# Patient Record
Sex: MALE | Race: BLACK OR AFRICAN AMERICAN | NOT HISPANIC OR LATINO | Employment: FULL TIME | URBAN - METROPOLITAN AREA
[De-identification: names, ages, dates, MRNs, and addresses within clinical notes are randomized per-mention and may not be internally consistent; named-entity substitution may affect disease eponyms.]

---

## 2020-12-23 PROBLEM — I82.409 ACUTE DVT (DEEP VENOUS THROMBOSIS): Status: ACTIVE | Noted: 2020-12-23

## 2020-12-23 PROBLEM — J18.9 PNEUMONIA: Status: ACTIVE | Noted: 2020-12-23

## 2020-12-29 ENCOUNTER — PATIENT OUTREACH (OUTPATIENT)
Dept: ADMINISTRATIVE | Facility: CLINIC | Age: 60
End: 2020-12-29

## 2020-12-29 NOTE — PATIENT INSTRUCTIONS
Pneumonia (Adult)  Pneumonia is an infection deep within the lungs. It is in the small air sacs (alveoli). Pneumonia may be caused by a virus or bacteria. Pneumonia caused by bacteria is usually treated with an antibiotic. Severe cases may need to be treated in the hospital. Milder cases can be treated at home. Symptoms usually start to get better during the first 2 days of treatment.    Home care  Follow these guidelines when caring for yourself at home:  · Rest at home for the first 2 to 3 days, or until you feel stronger. Dont let yourself get overly tired when you go back to your activities.  · Stay away from cigarette smoke - yours or other peoples.  · You may use acetaminophen or ibuprofen to control fever or pain, unless another medicine was prescribed. If you have chronic liver or kidney disease, talk with your healthcare provider before using these medicines. Also talk with your provider if youve had a stomach ulcer or gastrointestinal bleeding. Dont give aspirin to anyone younger than 18 years of age who is ill with a fever. It may cause severe liver damage.  · Your appetite may be poor, so a light diet is fine.  · Drink 6 to 8 glasses of fluids every day to make sure you are getting enough fluids. Beverages can include water, sport drinks, sodas without caffeine, juices, tea, or soup. Fluids will help loosen secretions in the lung. This will make it easier for you to cough up the phlegm (sputum). If you also have heart or kidney disease, check with your healthcare provider before you drink extra fluids.  · Take antibiotic medicine prescribed until it is all gone, even if you are feeling better after a few days.  Follow-up care  Follow up with your healthcare provider in the next 2 to 3 days, or as advised. This is to be sure the medicine is helping you get better.  If you are 65 or older, you should get a pneumococcal vaccine and a yearly flu (influenza) shot. You should also get these vaccines if  you have chronic lung disease like asthma, emphysema, or COPD. Recently, a second type of pneumonia vaccine has become available for everyone over 65 years old. This is in addition to the previous vaccine. Ask your provider about this.  When to seek medical advice  Call your healthcare provider right away if any of these occur:  · You dont get better within the first 48 hours of treatment  · Shortness of breath gets worse  · Rapid breathing (more than 25 breaths per minute)  · Coughing up blood  · Chest pain gets worse with breathing  · Fever of 100.4°F (38°C) or higher that doesnt get better with fever medicine  · Weakness, dizziness, or fainting that gets worse  · Thirst or dry mouth that gets worse  · Sinus pain, headache, or a stiff neck  · Chest pain not caused by coughing  Date Last Reviewed: 1/1/2017  © 3262-5481 [employer]. [address] [place] [address]. All rights reserved. This information is not intended as a substitute for professional medical care. Always follow your healthcare professional's instructions.

## 2020-12-29 NOTE — PROGRESS NOTES
Please forward this important TCC information to your provider in order to maximize the post discharge care delivery of this patient.    C3 nurse spoke with Torsten Gordillo  for a TCC post hospital discharge follow up call. The patient does not have a scheduled HOSFU appointment with pcp within 7-14 days post hospital discharge date 12/28/2020. C3 nurse was unable to schedule HOSFU appointment in [facility].  Please contact pcp and schedule follow up appointment using HOSFU visit type on or before 01/12/2021    Respectfully,  Kimi Guthrie LPN  [facility]    [email or web address]       Please do not reply to this message, as this inbox is not routinely monitored.

## 2021-01-27 PROBLEM — R79.89 ELEVATED BRAIN NATRIURETIC PEPTIDE (BNP) LEVEL: Status: ACTIVE | Noted: 2021-01-27

## 2021-01-27 PROBLEM — Z87.891 HISTORY OF TOBACCO ABUSE: Status: ACTIVE | Noted: 2021-01-27

## 2021-01-27 PROBLEM — Z79.01 CHRONIC ANTICOAGULATION: Status: ACTIVE | Noted: 2021-01-27

## 2021-01-27 PROBLEM — R94.31 ABNORMAL EKG: Status: ACTIVE | Noted: 2021-01-27

## 2021-01-27 PROBLEM — I50.30 HEART FAILURE WITH PRESERVED EJECTION FRACTION, CLASS III: Status: ACTIVE | Noted: 2021-01-27

## 2021-02-23 PROBLEM — J18.9 PNEUMONIA: Status: RESOLVED | Noted: 2020-12-23 | Resolved: 2021-02-23

## 2021-02-23 PROBLEM — Z87.01 HX OF BACTERIAL PNEUMONIA: Status: ACTIVE | Noted: 2021-02-23

## 2021-02-23 PROBLEM — Z86.718 HISTORY OF DVT (DEEP VEIN THROMBOSIS): Status: ACTIVE | Noted: 2021-02-23

## 2021-02-23 PROBLEM — I82.409 ACUTE DVT (DEEP VENOUS THROMBOSIS): Status: RESOLVED | Noted: 2020-12-23 | Resolved: 2021-02-23

## 2021-05-31 PROBLEM — I50.30 HEART FAILURE WITH PRESERVED EJECTION FRACTION, CLASS III: Status: RESOLVED | Noted: 2021-01-27 | Resolved: 2021-05-31

## 2021-05-31 PROBLEM — E11.65 UNCONTROLLED TYPE 2 DIABETES MELLITUS WITH HYPERGLYCEMIA: Status: ACTIVE | Noted: 2021-05-31

## 2021-05-31 PROBLEM — I34.0 NONRHEUMATIC MITRAL VALVE REGURGITATION: Status: ACTIVE | Noted: 2021-05-31

## 2021-05-31 PROBLEM — I51.7 LEFT ATRIAL ENLARGEMENT: Status: ACTIVE | Noted: 2021-05-31

## 2021-05-31 PROBLEM — R79.89 ELEVATED BRAIN NATRIURETIC PEPTIDE (BNP) LEVEL: Status: RESOLVED | Noted: 2021-01-27 | Resolved: 2021-05-31

## 2021-05-31 PROBLEM — I51.7 RIGHT ATRIAL ENLARGEMENT: Status: ACTIVE | Noted: 2021-05-31

## 2021-05-31 PROBLEM — R07.9 CHEST PAIN: Status: ACTIVE | Noted: 2021-05-31

## 2021-05-31 PROBLEM — I51.9 LEFT VENTRICULAR DIASTOLIC DYSFUNCTION: Status: ACTIVE | Noted: 2021-05-31

## 2021-05-31 PROBLEM — I37.1 NONRHEUMATIC PULMONARY VALVE INSUFFICIENCY: Status: ACTIVE | Noted: 2021-05-31

## 2021-05-31 PROBLEM — R04.2 HEMOPTYSIS: Status: ACTIVE | Noted: 2021-05-31

## 2021-05-31 PROBLEM — I82.411 ACUTE DEEP VEIN THROMBOSIS (DVT) OF FEMORAL VEIN OF RIGHT LOWER EXTREMITY: Status: ACTIVE | Noted: 2021-05-31

## 2021-05-31 PROBLEM — I27.21 PULMONARY ARTERIAL HYPERTENSION: Status: ACTIVE | Noted: 2021-05-31

## 2021-06-09 ENCOUNTER — PATIENT OUTREACH (OUTPATIENT)
Dept: ADMINISTRATIVE | Facility: HOSPITAL | Age: 61
End: 2021-06-09

## 2021-06-23 PROBLEM — I50.20 HFREF (HEART FAILURE WITH REDUCED EJECTION FRACTION): Status: ACTIVE | Noted: 2021-06-23

## 2021-06-23 PROBLEM — I82.411 ACUTE DEEP VEIN THROMBOSIS (DVT) OF FEMORAL VEIN OF RIGHT LOWER EXTREMITY: Status: RESOLVED | Noted: 2021-05-31 | Resolved: 2021-06-23

## 2021-06-23 PROBLEM — R07.9 CHEST PAIN: Status: RESOLVED | Noted: 2021-05-31 | Resolved: 2021-06-23

## 2021-06-23 PROBLEM — E11.65 UNCONTROLLED TYPE 2 DIABETES MELLITUS WITH HYPERGLYCEMIA: Status: RESOLVED | Noted: 2021-05-31 | Resolved: 2021-06-23

## 2021-06-23 PROBLEM — Z79.01 CHRONIC ANTICOAGULATION: Status: RESOLVED | Noted: 2021-01-27 | Resolved: 2021-06-23

## 2021-06-23 PROBLEM — R04.2 HEMOPTYSIS: Status: RESOLVED | Noted: 2021-05-31 | Resolved: 2021-06-23

## 2021-07-09 PROBLEM — Z79.01 CHRONIC ANTICOAGULATION: Status: ACTIVE | Noted: 2021-07-09

## 2021-07-09 PROBLEM — R07.89 OTHER CHEST PAIN: Status: ACTIVE | Noted: 2021-07-09

## 2021-07-09 PROBLEM — I50.20 HFREF (HEART FAILURE WITH REDUCED EJECTION FRACTION): Status: RESOLVED | Noted: 2021-06-23 | Resolved: 2021-07-09

## 2021-07-09 PROBLEM — I50.20 HEART FAILURE WITH REDUCED EJECTION FRACTION, NYHA CLASS II: Status: ACTIVE | Noted: 2021-07-09

## 2021-07-09 PROBLEM — R06.09 DOE (DYSPNEA ON EXERTION): Status: ACTIVE | Noted: 2021-07-09

## 2021-07-09 PROBLEM — E11.65 UNCONTROLLED TYPE 2 DIABETES MELLITUS WITH HYPERGLYCEMIA: Status: ACTIVE | Noted: 2021-07-09

## 2021-07-09 PROBLEM — I25.10 NON-OCCLUSIVE CORONARY ARTERY DISEASE: Status: ACTIVE | Noted: 2021-07-09

## 2021-07-12 PROBLEM — J40 BRONCHITIS: Status: ACTIVE | Noted: 2021-07-12

## 2021-10-26 PROBLEM — E11.65 UNCONTROLLED TYPE 2 DIABETES MELLITUS WITH HYPERGLYCEMIA: Status: RESOLVED | Noted: 2021-07-09 | Resolved: 2021-10-26

## 2021-12-09 PROBLEM — M25.551 RIGHT HIP PAIN: Status: ACTIVE | Noted: 2021-12-09

## 2021-12-09 PROBLEM — R07.9 CHEST PAIN: Status: ACTIVE | Noted: 2021-12-09

## 2022-02-28 PROBLEM — Z79.01 CHRONIC ANTICOAGULATION: Status: RESOLVED | Noted: 2021-07-09 | Resolved: 2022-02-28

## 2022-04-12 PROBLEM — R07.9 CHEST PAIN: Status: RESOLVED | Noted: 2021-12-09 | Resolved: 2022-04-12

## 2022-04-12 PROBLEM — I27.20 PULMONARY HYPERTENSION: Status: ACTIVE | Noted: 2022-04-12

## 2022-04-12 PROBLEM — E66.812 CLASS 2 SEVERE OBESITY DUE TO EXCESS CALORIES WITH SERIOUS COMORBIDITY AND BODY MASS INDEX (BMI) OF 37.0 TO 37.9 IN ADULT: Status: ACTIVE | Noted: 2022-04-12

## 2022-04-12 PROBLEM — E66.01 CLASS 2 SEVERE OBESITY DUE TO EXCESS CALORIES WITH SERIOUS COMORBIDITY AND BODY MASS INDEX (BMI) OF 37.0 TO 37.9 IN ADULT: Status: ACTIVE | Noted: 2022-04-12

## 2022-04-12 PROBLEM — I27.21 PULMONARY ARTERIAL HYPERTENSION: Status: RESOLVED | Noted: 2021-05-31 | Resolved: 2022-04-12

## 2022-04-18 ENCOUNTER — HOSPITAL ENCOUNTER (OUTPATIENT)
Dept: RADIOLOGY | Facility: HOSPITAL | Age: 62
Discharge: HOME | End: 2022-04-18
Attending: PHYSICIAN ASSISTANT
Payer: COMMERCIAL

## 2022-04-18 DIAGNOSIS — C61 CANCER OF PROSTATE: ICD-10-CM

## 2022-04-18 PROCEDURE — 72197 MRI PELVIS W/O & W/DYE: CPT | Mod: 26,,, | Performed by: RADIOLOGY

## 2022-04-18 PROCEDURE — 72197 MRI PROSTATE W W/O CONTRAST: ICD-10-PCS | Mod: 26,,, | Performed by: RADIOLOGY

## 2022-04-18 PROCEDURE — 72197 MRI PELVIS W/O & W/DYE: CPT | Mod: TC

## 2022-04-18 PROCEDURE — 25500020 PHARM REV CODE 255: Performed by: PHYSICIAN ASSISTANT

## 2022-04-18 PROCEDURE — A9585 GADOBUTROL INJECTION: HCPCS | Performed by: PHYSICIAN ASSISTANT

## 2022-04-18 RX ORDER — GADOBUTROL 604.72 MG/ML
10 INJECTION INTRAVENOUS
Status: COMPLETED | OUTPATIENT
Start: 2022-04-18 | End: 2022-04-18

## 2022-04-18 RX ADMIN — GADOBUTROL 10 ML: 604.72 INJECTION INTRAVENOUS at 01:04

## 2022-06-11 PROBLEM — N45.3 ORCHITIS AND EPIDIDYMITIS: Status: ACTIVE | Noted: 2022-06-11

## 2022-06-14 PROBLEM — C61 PROSTATE CA: Status: ACTIVE | Noted: 2022-06-14

## 2022-07-11 ENCOUNTER — PATIENT MESSAGE (OUTPATIENT)
Dept: ADMINISTRATIVE | Facility: HOSPITAL | Age: 62
End: 2022-07-11
Payer: COMMERCIAL

## 2022-07-27 ENCOUNTER — TELEPHONE (OUTPATIENT)
Dept: CARDIOTHORACIC SURGERY | Facility: CLINIC | Age: 62
End: 2022-07-27
Payer: COMMERCIAL

## 2022-07-27 PROBLEM — Z01.818 PRE-OP EVALUATION: Status: ACTIVE | Noted: 2022-07-27

## 2022-07-27 PROBLEM — E66.812 CLASS 2 SEVERE OBESITY DUE TO EXCESS CALORIES WITH SERIOUS COMORBIDITY AND BODY MASS INDEX (BMI) OF 37.0 TO 37.9 IN ADULT: Status: RESOLVED | Noted: 2022-04-12 | Resolved: 2022-07-27

## 2022-07-27 PROBLEM — I35.1 NONRHEUMATIC AORTIC VALVE INSUFFICIENCY: Status: ACTIVE | Noted: 2022-07-27

## 2022-07-27 PROBLEM — E66.01 CLASS 2 SEVERE OBESITY DUE TO EXCESS CALORIES WITH SERIOUS COMORBIDITY AND BODY MASS INDEX (BMI) OF 37.0 TO 37.9 IN ADULT: Status: RESOLVED | Noted: 2022-04-12 | Resolved: 2022-07-27

## 2022-07-27 NOTE — TELEPHONE ENCOUNTER
Called pt to schedule consultation appointment after receiving referral from Dr. Fair. Scheduled for next Thursday per pt's request. Provided directions and will mail appointment slip to pt's confirmed mailing address. Pt verbalized understanding of all information. .

## 2022-08-04 ENCOUNTER — OFFICE VISIT (OUTPATIENT)
Dept: CARDIOTHORACIC SURGERY | Facility: CLINIC | Age: 62
End: 2022-08-04
Payer: COMMERCIAL

## 2022-08-04 VITALS
BODY MASS INDEX: 26.87 KG/M2 | SYSTOLIC BLOOD PRESSURE: 139 MMHG | RESPIRATION RATE: 20 BRPM | HEIGHT: 71 IN | HEART RATE: 73 BPM | WEIGHT: 191.94 LBS | DIASTOLIC BLOOD PRESSURE: 80 MMHG | OXYGEN SATURATION: 99 %

## 2022-08-04 DIAGNOSIS — I34.0 NONRHEUMATIC MITRAL VALVE REGURGITATION: Primary | ICD-10-CM

## 2022-08-04 DIAGNOSIS — I50.20 HEART FAILURE WITH REDUCED EJECTION FRACTION, NYHA CLASS II: Primary | ICD-10-CM

## 2022-08-04 DIAGNOSIS — I34.0 NONRHEUMATIC MITRAL VALVE REGURGITATION: ICD-10-CM

## 2022-08-04 PROCEDURE — 3079F DIAST BP 80-89 MM HG: CPT | Mod: CPTII,S$GLB,, | Performed by: THORACIC SURGERY (CARDIOTHORACIC VASCULAR SURGERY)

## 2022-08-04 PROCEDURE — 3061F NEG MICROALBUMINURIA REV: CPT | Mod: CPTII,S$GLB,, | Performed by: THORACIC SURGERY (CARDIOTHORACIC VASCULAR SURGERY)

## 2022-08-04 PROCEDURE — 99205 PR OFFICE/OUTPT VISIT, NEW, LEVL V, 60-74 MIN: ICD-10-PCS | Mod: S$GLB,,, | Performed by: THORACIC SURGERY (CARDIOTHORACIC VASCULAR SURGERY)

## 2022-08-04 PROCEDURE — 99205 OFFICE O/P NEW HI 60 MIN: CPT | Mod: S$GLB,,, | Performed by: THORACIC SURGERY (CARDIOTHORACIC VASCULAR SURGERY)

## 2022-08-04 PROCEDURE — 3061F PR NEG MICROALBUMINURIA RESULT DOCUMENTED/REVIEW: ICD-10-PCS | Mod: CPTII,S$GLB,, | Performed by: THORACIC SURGERY (CARDIOTHORACIC VASCULAR SURGERY)

## 2022-08-04 PROCEDURE — 1159F PR MEDICATION LIST DOCUMENTED IN MEDICAL RECORD: ICD-10-PCS | Mod: CPTII,S$GLB,, | Performed by: THORACIC SURGERY (CARDIOTHORACIC VASCULAR SURGERY)

## 2022-08-04 PROCEDURE — 4010F ACE/ARB THERAPY RXD/TAKEN: CPT | Mod: CPTII,S$GLB,, | Performed by: THORACIC SURGERY (CARDIOTHORACIC VASCULAR SURGERY)

## 2022-08-04 PROCEDURE — 3008F PR BODY MASS INDEX (BMI) DOCUMENTED: ICD-10-PCS | Mod: CPTII,S$GLB,, | Performed by: THORACIC SURGERY (CARDIOTHORACIC VASCULAR SURGERY)

## 2022-08-04 PROCEDURE — 3066F PR DOCUMENTATION OF TREATMENT FOR NEPHROPATHY: ICD-10-PCS | Mod: CPTII,S$GLB,, | Performed by: THORACIC SURGERY (CARDIOTHORACIC VASCULAR SURGERY)

## 2022-08-04 PROCEDURE — 3044F PR MOST RECENT HEMOGLOBIN A1C LEVEL <7.0%: ICD-10-PCS | Mod: CPTII,S$GLB,, | Performed by: THORACIC SURGERY (CARDIOTHORACIC VASCULAR SURGERY)

## 2022-08-04 PROCEDURE — 99999 PR PBB SHADOW E&M-EST. PATIENT-LVL IV: ICD-10-PCS | Mod: PBBFAC,,, | Performed by: THORACIC SURGERY (CARDIOTHORACIC VASCULAR SURGERY)

## 2022-08-04 PROCEDURE — 99999 PR PBB SHADOW E&M-EST. PATIENT-LVL IV: CPT | Mod: PBBFAC,,, | Performed by: THORACIC SURGERY (CARDIOTHORACIC VASCULAR SURGERY)

## 2022-08-04 PROCEDURE — 3066F NEPHROPATHY DOC TX: CPT | Mod: CPTII,S$GLB,, | Performed by: THORACIC SURGERY (CARDIOTHORACIC VASCULAR SURGERY)

## 2022-08-04 PROCEDURE — 4010F PR ACE/ARB THEARPY RXD/TAKEN: ICD-10-PCS | Mod: CPTII,S$GLB,, | Performed by: THORACIC SURGERY (CARDIOTHORACIC VASCULAR SURGERY)

## 2022-08-04 PROCEDURE — 3075F SYST BP GE 130 - 139MM HG: CPT | Mod: CPTII,S$GLB,, | Performed by: THORACIC SURGERY (CARDIOTHORACIC VASCULAR SURGERY)

## 2022-08-04 PROCEDURE — 3075F PR MOST RECENT SYSTOLIC BLOOD PRESS GE 130-139MM HG: ICD-10-PCS | Mod: CPTII,S$GLB,, | Performed by: THORACIC SURGERY (CARDIOTHORACIC VASCULAR SURGERY)

## 2022-08-04 PROCEDURE — 3079F PR MOST RECENT DIASTOLIC BLOOD PRESSURE 80-89 MM HG: ICD-10-PCS | Mod: CPTII,S$GLB,, | Performed by: THORACIC SURGERY (CARDIOTHORACIC VASCULAR SURGERY)

## 2022-08-04 PROCEDURE — 1159F MED LIST DOCD IN RCRD: CPT | Mod: CPTII,S$GLB,, | Performed by: THORACIC SURGERY (CARDIOTHORACIC VASCULAR SURGERY)

## 2022-08-04 PROCEDURE — 3008F BODY MASS INDEX DOCD: CPT | Mod: CPTII,S$GLB,, | Performed by: THORACIC SURGERY (CARDIOTHORACIC VASCULAR SURGERY)

## 2022-08-04 PROCEDURE — 3044F HG A1C LEVEL LT 7.0%: CPT | Mod: CPTII,S$GLB,, | Performed by: THORACIC SURGERY (CARDIOTHORACIC VASCULAR SURGERY)

## 2022-08-04 PROCEDURE — 3072F PR LOW RISK FOR RETINOPATHY: ICD-10-PCS | Mod: CPTII,S$GLB,, | Performed by: THORACIC SURGERY (CARDIOTHORACIC VASCULAR SURGERY)

## 2022-08-04 PROCEDURE — 3072F LOW RISK FOR RETINOPATHY: CPT | Mod: CPTII,S$GLB,, | Performed by: THORACIC SURGERY (CARDIOTHORACIC VASCULAR SURGERY)

## 2022-08-04 NOTE — PROGRESS NOTES
Subjective:      Patient ID: Torsten Gordillo is a 62 y.o. male.    Chief Complaint: No chief complaint on file.      HPI:  Torsten Gordillo is a 62 y.o. male who presents for surgical evaluation of MR. Medical conditions include COPD, DM2, JULIAN, Fatty Liver, HTN, HLD, dilated cardiomyopathy, prostate cancer following with urology (low risk group per report). Patient reports that he has been having increasing SOB. Can only walk 1/2 block prior to having difficulty. Energy level is at a zero. Occasional dizziness and lower extremity swelling. Some orthopnea but not every night. Also with occasional palpitations at night. No chest pain. No prior strokes, seizures, stents, or sternotomies. Had a DVT last year when admitted for heart failure that resolved with anticoagulation. Quit smoking 3 months ago. Prior to that smoked 1/2ppd for around 40 years. No significant drinking history. Diabetes is controlled by diet and patient does not check them regularly.     Family and social history reviewed    Current Outpatient Medications   Medication Instructions    acetaminophen (TYLENOL) 650 mg, Oral, Every 6 hours PRN    albuterol (PROVENTIL/VENTOLIN HFA) 90 mcg/actuation inhaler 2 puffs, Inhalation, Every 6 hours PRN, Rescue    aspirin (ECOTRIN) 81 mg, Oral, Daily    atorvastatin (LIPITOR) 80 mg, Oral, Nightly    carvediloL (COREG) 25 mg, Oral, 2 times daily with meals    cetirizine (ZYRTEC) 10 mg, Oral, Daily    diclofenac sodium (VOLTAREN) 2 g, Topical (Top), 4 times daily    ezetimibe (ZETIA) 10 mg, Oral, Daily    finasteride (PROSCAR) 5 mg, Oral, Daily    fluticasone propionate (FLONASE) 50 mcg, Each Nostril, Daily    furosemide (LASIX) 40 mg, Oral, As needed (PRN), 1 up to 4 a day extra, ONLY as needed for swelling    glimepiride (AMARYL) 4 mg, Oral, With breakfast    lancets Misc Test daily    nitroGLYCERIN (NITROSTAT) 0.4 mg, Sublingual, Every 5 min PRN    sacubitriL-valsartan (ENTRESTO) 49-51 mg per  tablet 1 tablet, Oral, 2 times daily    SPIRIVA WITH HANDIHALER 18 mcg, Inhalation, Daily    spironolactone (ALDACTONE) 25 mg, Oral, Daily    tiZANidine (ZANAFLEX) 4 mg, Oral, Every 8 hours PRN    traMADoL (ULTRAM) 50 mg, Oral, Every 8 hours PRN       Review of patient's allergies indicates:  No Known Allergies  Past Medical History:   Diagnosis Date    Bronchitis     Cardiomyopathy 5/2/2016    40-45% by SIMONA    COPD (chronic obstructive pulmonary disease)     Diabetes mellitus     DM (diabetes mellitus), type 2 5/11/2016    Fatty liver     Heart failure with reduced ejection fraction, NYHA class II 7/9/2021    EF 40% in 04/2021 NYHA III; sleeps in recliner, PND, rare LE edema    History of DVT (deep vein thrombosis) 2/23/2021    Hyperlipidemia 5/2/2016    Hypertension     Nonrheumatic mitral valve regurgitation 5/31/2021    Severe    Pulmonary arterial hypertension 5/31/2021    Moderate    Suspected sleep apnea 5/2/2016     Past Surgical History:   Procedure Laterality Date    BIOPSY WITH TRANSRECTAL ULTRASOUND (TRUS) GUIDANCE N/A 2/2/2022    Procedure: BIOPSY, WITH TRANSRECTAL US GUIDANCE;  Surgeon: Thomas Ventura II, MD;  Location: [facility];  Service: Urology;  Laterality: N/A;  prostate     COLONOSCOPY N/A 7/5/2016    Procedure: COLONOSCOPY;  Surgeon: Faith Harris MD;  Location: [facility];  Service: Endoscopy;  Laterality: N/A;    COLONOSCOPY N/A 11/16/2021    Procedure: COLONOSCOPY;  Surgeon: Luis Bogran-Reyes, MD;  Location: [facility];  Service: Endoscopy;  Laterality: N/A;    CYSTOSCOPY N/A 2/2/2022    Procedure: CYSTOSCOPY;  Surgeon: Thomas Ventura II, MD;  Location: [facility];  Service: Urology;  Laterality: N/A;    LEFT HEART CATHETERIZATION Left 7/7/2022    Procedure: CATHETERIZATION, HEART, LEFT;  Surgeon: Quinn Duke MD;  Location[facility] CATH LAB;  Service: Cardiology;  Laterality: Left;     Family History     Problem Relation (Age of Onset)    Diabetes Mother     Hypertension Mother    Prostate cancer Father        Social History     Socioeconomic History    Marital status:     Years of education: 12   Tobacco Use    Smoking status: Former Smoker     Packs/day: 1.00     Years: 39.00     Pack years: 39.00     Types: Cigarettes     Start date: 1977     Quit date: 2021     Years since quittin.5    Smokeless tobacco: Never Used   Substance and Sexual Activity    Alcohol use: No     Alcohol/week: 0.0 standard drinks    Drug use: No    Sexual activity: Not Currently       Current medications Reviewed    Review of Systems   Constitutional: Positive for activity change and fatigue.   HENT: Negative for nosebleeds.    Eyes: Negative for visual disturbance.   Respiratory: Positive for cough and shortness of breath.    Cardiovascular: Positive for palpitations and leg swelling. Negative for chest pain.   Gastrointestinal: Negative for nausea.   Musculoskeletal: Negative for gait problem.   Skin: Negative for color change.   Neurological: Positive for dizziness. Negative for seizures.   Hematological: Does not bruise/bleed easily.   Psychiatric/Behavioral: Negative for sleep disturbance.     Objective:   Physical Exam  Vitals reviewed.   Constitutional:       General: He is not in acute distress.     Appearance: He is well-developed. He is not diaphoretic.   HENT:      Head: Normocephalic and atraumatic.   Eyes:      Pupils: Pupils are equal, round, and reactive to light.   Neck:      Vascular: No JVD.   Cardiovascular:      Rate and Rhythm: Normal rate.   Pulmonary:      Effort: Pulmonary effort is normal. No respiratory distress.   Abdominal:      General: There is no distension.   Musculoskeletal:         General: No swelling.      Cervical back: Normal range of motion.   Skin:     Coloration: Skin is not pale.   Neurological:      Mental Status: He is alert and oriented to person, place, and time.   Psychiatric:         Speech: Speech normal.          Behavior: Behavior normal.         Thought Content: Thought content normal.         Judgment: Judgment normal.       Diagnostic Results: reviewed   Carotid US 7/28/22  1. No evidence of flowing stenosis regard to the right nor left carotid system.  2. Medialized course involving the bilateral internal carotid arteries.  3. Luminal stenosis in the less than 50% luminal diameter reduction range exclusively on the left given the fairly broad spectral waveform pattern.    SIMONA 7/7/22  1. The left ventricle (LV) is dilated with low normal to mildly depressed systolic function. LV ejection fraction is 45-50%.  2. The right ventricle (RV) is normal in size with normal systolic function.   3. Enlarged atria.  4. The aortic valve is tri-leaflet with normal mobility. There is mild aortic regurgitation. Ascending aorta is mildly enlarged measuring 3.7 cm.   5. The mitral valve is normal in structure with normal mobility. There is clear mal-coaptation due to annular dilatation resulting in multiple regurgitant jets; mitral valve regurgitation grade is severe.  6. Mild-moderate tricuspid regurgitation.    [facility] 7/7/22  · Nonobstructive coronary artery disease  · Normal filling pressures    TTE 4/27/22  · The left ventricle is moderately enlarged with moderate eccentric hypertrophy and mildly decreased systolic function.  · The estimated ejection fraction is about 40-45%.  · The quantitatively derived ejection fraction is 41%.  · The left ventricular global longitudinal strain is -13%.  · There is mild left ventricular global hypokinesis.  · Grade II left ventricular diastolic dysfunction.  · Severe left atrial enlargement.  · Normal right ventricular size with normal right ventricular systolic function.  · Severe mitral regurgitation.  · The estimated PA systolic pressure is 60 mmHg.  · There is moderate to severe pulmonary hypertension.  · Mild pulmonic regurgitation.  · Moderate tricuspid regurgitation.  · Normal central venous  pressure (3 mmHg).  · Consider SIMONA to better visualize the mitral valve, if clinically indicated.  · Strain imaging suggestive of cardiac amyloidosis. Clinical correlation is required.  · LV 6.14cm    Assessment:   Severe MR   Plan:     CTS Attending Note:    I have personally taken the history and examined this patient and agree with the KAMALJIT's note as stated above.  Very pleasant 62-year-old gentleman with severe mitral regurgitation and pulmonary hypertension.  He is symptomatic for this.  I recommended mitral valve repair, and he agreed with this.  We discussed the risks and benefits of the proposed procedure, including but not limited to death, stroke, respiratory complications, renal complications, arrythmia, need for permanent pacemaker, and infection. We discussed the STS predicted risk. His questions have been answered, and he wishes to proceed. After a discussion of the advantages and disadvantages of tissue and mechanical prostheses, he indicated that he desires a tissue valve should repair not be feasible.

## 2022-08-08 DIAGNOSIS — Z01.818 PRE-OP TESTING: ICD-10-CM

## 2022-08-08 DIAGNOSIS — I34.0 NONRHEUMATIC MITRAL VALVE REGURGITATION: Primary | ICD-10-CM

## 2022-08-09 ENCOUNTER — TELEPHONE (OUTPATIENT)
Dept: CARDIOTHORACIC SURGERY | Facility: CLINIC | Age: 62
End: 2022-08-09
Payer: COMMERCIAL

## 2022-08-09 NOTE — TELEPHONE ENCOUNTER
Called pt to review pre-op testing appointments which have been scheduled for Tuesday, 9/6. Also reviewed pt medications. Pt will need to take last dose of Entresto on Sunday, 9/4. Pt will receive call from anesthesia department with instructions on diabetes medications. Will mail appointment slips to pt's confirmed mailing address. Pt verbalized understanding of all information.

## 2022-08-29 ENCOUNTER — TELEPHONE (OUTPATIENT)
Dept: CARDIOTHORACIC SURGERY | Facility: CLINIC | Age: 62
End: 2022-08-29
Payer: COMMERCIAL

## 2022-08-29 NOTE — TELEPHONE ENCOUNTER
Returned call to pt and provided him the fax number, phone number, and email address for the FMLA/Disability Department. Pt verbalized understanding.       ----- Message from Hi Stapleton sent at 8/29/2022  9:03 AM CDT -----  Regarding: advise-FMLA  Contact: PT @ [phone]  Pt is calling to speak to someone in Dr. Andrew's office to discuss FMLA paper work that he has. Pt states that his job provided him with FMLA papers to have Dr. Andrew fill out his part, so that he can give to his employer. Please call. Thanks.

## 2022-09-01 ENCOUNTER — TELEPHONE (OUTPATIENT)
Dept: CARDIOTHORACIC SURGERY | Facility: CLINIC | Age: 62
End: 2022-09-01
Payer: COMMERCIAL

## 2022-09-01 NOTE — TELEPHONE ENCOUNTER
Called pt to remind him to take last dose of Entresto on Sunday, 9/4. Future doses after Sunday should be held. Pt verbalized understanding.

## 2022-09-06 ENCOUNTER — HOSPITAL ENCOUNTER (OUTPATIENT)
Dept: VASCULAR SURGERY | Facility: CLINIC | Age: 62
Discharge: HOME | End: 2022-09-06
Attending: THORACIC SURGERY (CARDIOTHORACIC VASCULAR SURGERY)
Payer: COMMERCIAL

## 2022-09-06 ENCOUNTER — OFFICE VISIT (OUTPATIENT)
Dept: CARDIOTHORACIC SURGERY | Facility: CLINIC | Age: 62
End: 2022-09-06
Payer: COMMERCIAL

## 2022-09-06 ENCOUNTER — HOSPITAL ENCOUNTER (OUTPATIENT)
Dept: CARDIOLOGY | Facility: HOSPITAL | Age: 62
Discharge: HOME | DRG: 220 | End: 2022-09-06
Attending: THORACIC SURGERY (CARDIOTHORACIC VASCULAR SURGERY)
Payer: COMMERCIAL

## 2022-09-06 ENCOUNTER — HOSPITAL ENCOUNTER (OUTPATIENT)
Dept: CARDIOLOGY | Facility: CLINIC | Age: 62
Discharge: HOME | End: 2022-09-06
Payer: COMMERCIAL

## 2022-09-06 VITALS
SYSTOLIC BLOOD PRESSURE: 130 MMHG | OXYGEN SATURATION: 99 % | RESPIRATION RATE: 18 BRPM | DIASTOLIC BLOOD PRESSURE: 99 MMHG | BODY MASS INDEX: 26.47 KG/M2 | HEIGHT: 71 IN | HEART RATE: 78 BPM | WEIGHT: 189.06 LBS

## 2022-09-06 VITALS
HEIGHT: 71 IN | WEIGHT: 191 LBS | DIASTOLIC BLOOD PRESSURE: 80 MMHG | SYSTOLIC BLOOD PRESSURE: 139 MMHG | HEART RATE: 75 BPM | BODY MASS INDEX: 26.74 KG/M2

## 2022-09-06 DIAGNOSIS — Z01.818 PRE-OP TESTING: ICD-10-CM

## 2022-09-06 DIAGNOSIS — I34.0 NONRHEUMATIC MITRAL VALVE REGURGITATION: ICD-10-CM

## 2022-09-06 DIAGNOSIS — I34.0 NONRHEUMATIC MITRAL VALVE REGURGITATION: Primary | ICD-10-CM

## 2022-09-06 DIAGNOSIS — Z01.818 PRE-OP EXAMINATION: Primary | ICD-10-CM

## 2022-09-06 DIAGNOSIS — I34.0 MITRAL REGURGITATION: ICD-10-CM

## 2022-09-06 LAB
ASCENDING AORTA: 3.98 CM
AV INDEX (PROSTH): 0.71
AV MEAN GRADIENT: 3 MMHG
AV PEAK GRADIENT: 6 MMHG
AV VALVE AREA: 3.03 CM2
AV VELOCITY RATIO: 0.76
BSA FOR ECHO PROCEDURE: 2.08 M2
CV ECHO LV RWT: 0.33 CM
DOP CALC AO PEAK VEL: 1.19 M/S
DOP CALC AO VTI: 18.78 CM
DOP CALC LVOT AREA: 4.3 CM2
DOP CALC LVOT DIAMETER: 2.33 CM
DOP CALC LVOT PEAK VEL: 0.9 M/S
DOP CALC LVOT STROKE VOLUME: 56.98 CM3
DOP CALCLVOT PEAK VEL VTI: 13.37 CM
E WAVE DECELERATION TIME: 95.81 MSEC
E/A RATIO: 2.18
E/E' RATIO: 12.59 M/S
ECHO LV POSTERIOR WALL: 1.03 CM (ref 0.6–1.1)
EJECTION FRACTION: 38 %
FRACTIONAL SHORTENING: 20 % (ref 28–44)
INTERVENTRICULAR SEPTUM: 0.87 CM (ref 0.6–1.1)
IVRT: 74.22 MSEC
LA MAJOR: 7.06 CM
LA MINOR: 7.05 CM
LA WIDTH: 5.75 CM
LEFT ATRIUM SIZE: 4.68 CM
LEFT ATRIUM VOLUME INDEX MOD: 72.3 ML/M2
LEFT ATRIUM VOLUME INDEX: 78 ML/M2
LEFT ATRIUM VOLUME MOD: 149.57 CM3
LEFT ATRIUM VOLUME: 161.37 CM3
LEFT INTERNAL DIMENSION IN SYSTOLE: 4.94 CM (ref 2.1–4)
LEFT VENTRICLE DIASTOLIC VOLUME INDEX: 92.77 ML/M2
LEFT VENTRICLE DIASTOLIC VOLUME: 192.03 ML
LEFT VENTRICLE MASS INDEX: 117 G/M2
LEFT VENTRICLE SYSTOLIC VOLUME INDEX: 55.5 ML/M2
LEFT VENTRICLE SYSTOLIC VOLUME: 114.93 ML
LEFT VENTRICULAR INTERNAL DIMENSION IN DIASTOLE: 6.17 CM (ref 3.5–6)
LEFT VENTRICULAR MASS: 242.44 G
LV LATERAL E/E' RATIO: 10.7 M/S
LV SEPTAL E/E' RATIO: 15.29 M/S
MV A" WAVE DURATION": 18.55 MSEC
MV PEAK A VEL: 0.49 M/S
MV PEAK E VEL: 1.07 M/S
MV STENOSIS PRESSURE HALF TIME: 27.78 MS
MV VALVE AREA P 1/2 METHOD: 7.92 CM2
PISA TR MAX VEL: 3.26 M/S
PULM VEIN S/D RATIO: 0.43
PV PEAK D VEL: 0.74 M/S
PV PEAK S VEL: 0.32 M/S
RA MAJOR: 5.46 CM
RA PRESSURE: 3 MMHG
RA WIDTH: 4.91 CM
RIGHT VENTRICULAR END-DIASTOLIC DIMENSION: 3.88 CM
RV TISSUE DOPPLER FREE WALL SYSTOLIC VELOCITY 1 (APICAL 4 CHAMBER VIEW): 12.73 CM/S
SINUS: 3.93 CM
STJ: 3.61 CM
TDI LATERAL: 0.1 M/S
TDI SEPTAL: 0.07 M/S
TDI: 0.09 M/S
TR MAX PG: 43 MMHG
TRICUSPID ANNULAR PLANE SYSTOLIC EXCURSION: 2.22 CM
TV REST PULMONARY ARTERY PRESSURE: 46 MMHG

## 2022-09-06 PROCEDURE — 93005 EKG 12-LEAD: ICD-10-PCS | Mod: S$GLB,,, | Performed by: THORACIC SURGERY (CARDIOTHORACIC VASCULAR SURGERY)

## 2022-09-06 PROCEDURE — 4010F PR ACE/ARB THEARPY RXD/TAKEN: ICD-10-PCS | Mod: CPTII,S$GLB,, | Performed by: THORACIC SURGERY (CARDIOTHORACIC VASCULAR SURGERY)

## 2022-09-06 PROCEDURE — 3066F NEPHROPATHY DOC TX: CPT | Mod: CPTII,S$GLB,, | Performed by: THORACIC SURGERY (CARDIOTHORACIC VASCULAR SURGERY)

## 2022-09-06 PROCEDURE — 1159F PR MEDICATION LIST DOCUMENTED IN MEDICAL RECORD: ICD-10-PCS | Mod: CPTII,S$GLB,, | Performed by: THORACIC SURGERY (CARDIOTHORACIC VASCULAR SURGERY)

## 2022-09-06 PROCEDURE — 99999 PR PBB SHADOW E&M-EST. PATIENT-LVL IV: ICD-10-PCS | Mod: PBBFAC,,, | Performed by: THORACIC SURGERY (CARDIOTHORACIC VASCULAR SURGERY)

## 2022-09-06 PROCEDURE — 3080F DIAST BP >= 90 MM HG: CPT | Mod: CPTII,S$GLB,, | Performed by: THORACIC SURGERY (CARDIOTHORACIC VASCULAR SURGERY)

## 2022-09-06 PROCEDURE — 3080F PR MOST RECENT DIASTOLIC BLOOD PRESSURE >= 90 MM HG: ICD-10-PCS | Mod: CPTII,S$GLB,, | Performed by: THORACIC SURGERY (CARDIOTHORACIC VASCULAR SURGERY)

## 2022-09-06 PROCEDURE — 3044F HG A1C LEVEL LT 7.0%: CPT | Mod: CPTII,S$GLB,, | Performed by: THORACIC SURGERY (CARDIOTHORACIC VASCULAR SURGERY)

## 2022-09-06 PROCEDURE — 93005 ELECTROCARDIOGRAM TRACING: CPT | Mod: S$GLB,,, | Performed by: THORACIC SURGERY (CARDIOTHORACIC VASCULAR SURGERY)

## 2022-09-06 PROCEDURE — 1159F MED LIST DOCD IN RCRD: CPT | Mod: CPTII,S$GLB,, | Performed by: THORACIC SURGERY (CARDIOTHORACIC VASCULAR SURGERY)

## 2022-09-06 PROCEDURE — 4010F ACE/ARB THERAPY RXD/TAKEN: CPT | Mod: CPTII,S$GLB,, | Performed by: THORACIC SURGERY (CARDIOTHORACIC VASCULAR SURGERY)

## 2022-09-06 PROCEDURE — 93306 ECHO (CUPID ONLY): ICD-10-PCS | Mod: 26,,, | Performed by: INTERNAL MEDICINE

## 2022-09-06 PROCEDURE — 3008F PR BODY MASS INDEX (BMI) DOCUMENTED: ICD-10-PCS | Mod: CPTII,S$GLB,, | Performed by: THORACIC SURGERY (CARDIOTHORACIC VASCULAR SURGERY)

## 2022-09-06 PROCEDURE — 3075F PR MOST RECENT SYSTOLIC BLOOD PRESS GE 130-139MM HG: ICD-10-PCS | Mod: CPTII,S$GLB,, | Performed by: THORACIC SURGERY (CARDIOTHORACIC VASCULAR SURGERY)

## 2022-09-06 PROCEDURE — 86920 COMPATIBILITY TEST SPIN: CPT | Performed by: STUDENT IN AN ORGANIZED HEALTH CARE EDUCATION/TRAINING PROGRAM

## 2022-09-06 PROCEDURE — 3072F PR LOW RISK FOR RETINOPATHY: ICD-10-PCS | Mod: CPTII,S$GLB,, | Performed by: THORACIC SURGERY (CARDIOTHORACIC VASCULAR SURGERY)

## 2022-09-06 PROCEDURE — 3061F NEG MICROALBUMINURIA REV: CPT | Mod: CPTII,S$GLB,, | Performed by: THORACIC SURGERY (CARDIOTHORACIC VASCULAR SURGERY)

## 2022-09-06 PROCEDURE — 93306 TTE W/DOPPLER COMPLETE: CPT | Mod: 26,,, | Performed by: INTERNAL MEDICINE

## 2022-09-06 PROCEDURE — 93010 EKG 12-LEAD: ICD-10-PCS | Mod: S$GLB,,, | Performed by: INTERNAL MEDICINE

## 2022-09-06 PROCEDURE — 99499 UNLISTED E&M SERVICE: CPT | Mod: S$GLB,,, | Performed by: THORACIC SURGERY (CARDIOTHORACIC VASCULAR SURGERY)

## 2022-09-06 PROCEDURE — 99499 NO LOS: ICD-10-PCS | Mod: S$GLB,,, | Performed by: THORACIC SURGERY (CARDIOTHORACIC VASCULAR SURGERY)

## 2022-09-06 PROCEDURE — 93880 PR DUPLEX SCAN EXTRACRANIAL,BILAT: ICD-10-PCS | Mod: S$GLB,,, | Performed by: SURGERY

## 2022-09-06 PROCEDURE — 3072F LOW RISK FOR RETINOPATHY: CPT | Mod: CPTII,S$GLB,, | Performed by: THORACIC SURGERY (CARDIOTHORACIC VASCULAR SURGERY)

## 2022-09-06 PROCEDURE — 93010 ELECTROCARDIOGRAM REPORT: CPT | Mod: S$GLB,,, | Performed by: INTERNAL MEDICINE

## 2022-09-06 PROCEDURE — 99999 PR PBB SHADOW E&M-EST. PATIENT-LVL IV: CPT | Mod: PBBFAC,,, | Performed by: THORACIC SURGERY (CARDIOTHORACIC VASCULAR SURGERY)

## 2022-09-06 PROCEDURE — 3044F PR MOST RECENT HEMOGLOBIN A1C LEVEL <7.0%: ICD-10-PCS | Mod: CPTII,S$GLB,, | Performed by: THORACIC SURGERY (CARDIOTHORACIC VASCULAR SURGERY)

## 2022-09-06 PROCEDURE — 93306 TTE W/DOPPLER COMPLETE: CPT

## 2022-09-06 PROCEDURE — 3061F PR NEG MICROALBUMINURIA RESULT DOCUMENTED/REVIEW: ICD-10-PCS | Mod: CPTII,S$GLB,, | Performed by: THORACIC SURGERY (CARDIOTHORACIC VASCULAR SURGERY)

## 2022-09-06 PROCEDURE — 3066F PR DOCUMENTATION OF TREATMENT FOR NEPHROPATHY: ICD-10-PCS | Mod: CPTII,S$GLB,, | Performed by: THORACIC SURGERY (CARDIOTHORACIC VASCULAR SURGERY)

## 2022-09-06 PROCEDURE — 3008F BODY MASS INDEX DOCD: CPT | Mod: CPTII,S$GLB,, | Performed by: THORACIC SURGERY (CARDIOTHORACIC VASCULAR SURGERY)

## 2022-09-06 PROCEDURE — 93880 EXTRACRANIAL BILAT STUDY: CPT | Mod: S$GLB,,, | Performed by: SURGERY

## 2022-09-06 PROCEDURE — 3075F SYST BP GE 130 - 139MM HG: CPT | Mod: CPTII,S$GLB,, | Performed by: THORACIC SURGERY (CARDIOTHORACIC VASCULAR SURGERY)

## 2022-09-06 RX ORDER — POTASSIUM CHLORIDE 14.9 MG/ML
60 INJECTION INTRAVENOUS
Status: CANCELLED | OUTPATIENT
Start: 2022-09-06

## 2022-09-06 RX ORDER — ASPIRIN 325 MG
325 TABLET ORAL DAILY
Status: CANCELLED | OUTPATIENT
Start: 2022-09-06

## 2022-09-06 RX ORDER — FAMOTIDINE 10 MG/ML
20 INJECTION INTRAVENOUS 2 TIMES DAILY
Status: CANCELLED | OUTPATIENT
Start: 2022-09-06

## 2022-09-06 RX ORDER — SODIUM CHLORIDE 9 MG/ML
INJECTION, SOLUTION INTRAVENOUS CONTINUOUS
Status: CANCELLED | OUTPATIENT
Start: 2022-09-06

## 2022-09-06 RX ORDER — METOCLOPRAMIDE HYDROCHLORIDE 5 MG/ML
5 INJECTION INTRAMUSCULAR; INTRAVENOUS EVERY 6 HOURS PRN
Status: CANCELLED | OUTPATIENT
Start: 2022-09-06

## 2022-09-06 RX ORDER — MUPIROCIN 20 MG/G
1 OINTMENT TOPICAL 2 TIMES DAILY
Status: CANCELLED | OUTPATIENT
Start: 2022-09-06 | End: 2022-09-11

## 2022-09-06 RX ORDER — ALBUMIN HUMAN 50 G/1000ML
25 SOLUTION INTRAVENOUS ONCE AS NEEDED
Status: CANCELLED | OUTPATIENT
Start: 2022-09-06 | End: 2034-02-02

## 2022-09-06 RX ORDER — LIDOCAINE HYDROCHLORIDE 10 MG/ML
1 INJECTION, SOLUTION EPIDURAL; INFILTRATION; INTRACAUDAL; PERINEURAL
Status: CANCELLED | OUTPATIENT
Start: 2022-09-06

## 2022-09-06 RX ORDER — SODIUM CHLORIDE 0.9 % (FLUSH) 0.9 %
10 SYRINGE (ML) INJECTION
Status: CANCELLED | OUTPATIENT
Start: 2022-09-06

## 2022-09-06 RX ORDER — PROPOFOL 10 MG/ML
0-50 INJECTION, EMULSION INTRAVENOUS CONTINUOUS
Status: CANCELLED | OUTPATIENT
Start: 2022-09-06

## 2022-09-06 RX ORDER — ASPIRIN 325 MG
325 TABLET ORAL ONCE
Status: CANCELLED | OUTPATIENT
Start: 2022-09-06 | End: 2022-09-06

## 2022-09-06 RX ORDER — IPRATROPIUM BROMIDE AND ALBUTEROL SULFATE 2.5; .5 MG/3ML; MG/3ML
3 SOLUTION RESPIRATORY (INHALATION) EVERY 4 HOURS
Status: CANCELLED | OUTPATIENT
Start: 2022-09-06 | End: 2022-09-07

## 2022-09-06 RX ORDER — DEXTROSE MONOHYDRATE, SODIUM CHLORIDE, AND POTASSIUM CHLORIDE 50; 1.49; 4.5 G/1000ML; G/1000ML; G/1000ML
INJECTION, SOLUTION INTRAVENOUS CONTINUOUS
Status: CANCELLED | OUTPATIENT
Start: 2022-09-06

## 2022-09-06 RX ORDER — FENTANYL CITRATE 50 UG/ML
25 INJECTION, SOLUTION INTRAMUSCULAR; INTRAVENOUS
Status: CANCELLED | OUTPATIENT
Start: 2022-09-06 | End: 2022-09-10

## 2022-09-06 RX ORDER — POLYETHYLENE GLYCOL 3350 17 G/17G
17 POWDER, FOR SOLUTION ORAL DAILY
Status: CANCELLED | OUTPATIENT
Start: 2022-09-06

## 2022-09-06 RX ORDER — FENTANYL CITRATE 50 UG/ML
50 INJECTION, SOLUTION INTRAMUSCULAR; INTRAVENOUS
Status: CANCELLED | OUTPATIENT
Start: 2022-09-11

## 2022-09-06 RX ORDER — ASPIRIN 325 MG
325 TABLET, DELAYED RELEASE (ENTERIC COATED) ORAL DAILY
Status: CANCELLED | OUTPATIENT
Start: 2022-09-06

## 2022-09-06 RX ORDER — FENTANYL CITRATE 50 UG/ML
25 INJECTION, SOLUTION INTRAMUSCULAR; INTRAVENOUS
Status: CANCELLED | OUTPATIENT
Start: 2022-09-06

## 2022-09-06 RX ORDER — OXYCODONE HYDROCHLORIDE 5 MG/1
10 TABLET ORAL EVERY 4 HOURS PRN
Status: CANCELLED | OUTPATIENT
Start: 2022-09-06

## 2022-09-06 RX ORDER — FAMOTIDINE 20 MG/1
20 TABLET, FILM COATED ORAL 2 TIMES DAILY
Status: CANCELLED | OUTPATIENT
Start: 2022-09-06

## 2022-09-06 RX ORDER — BISACODYL 10 MG
10 SUPPOSITORY, RECTAL RECTAL DAILY PRN
Status: CANCELLED | OUTPATIENT
Start: 2022-09-06

## 2022-09-06 RX ORDER — METOPROLOL TARTRATE 25 MG/1
25 TABLET ORAL
Status: CANCELLED | OUTPATIENT
Start: 2022-09-06

## 2022-09-06 RX ORDER — OXYCODONE HYDROCHLORIDE 5 MG/1
5 TABLET ORAL EVERY 4 HOURS PRN
Status: CANCELLED | OUTPATIENT
Start: 2022-09-06

## 2022-09-06 RX ORDER — ONDANSETRON 2 MG/ML
4 INJECTION INTRAMUSCULAR; INTRAVENOUS EVERY 12 HOURS PRN
Status: CANCELLED | OUTPATIENT
Start: 2022-09-06

## 2022-09-06 RX ORDER — ATORVASTATIN CALCIUM 10 MG/1
40 TABLET, FILM COATED ORAL NIGHTLY
Status: CANCELLED | OUTPATIENT
Start: 2022-09-06

## 2022-09-06 RX ORDER — POTASSIUM CHLORIDE 750 MG/1
20 TABLET, EXTENDED RELEASE ORAL EVERY 12 HOURS
Status: CANCELLED | OUTPATIENT
Start: 2022-09-06

## 2022-09-06 RX ORDER — ACETAMINOPHEN 325 MG/1
650 TABLET ORAL EVERY 4 HOURS PRN
Status: CANCELLED | OUTPATIENT
Start: 2022-09-06

## 2022-09-06 RX ORDER — IPRATROPIUM BROMIDE AND ALBUTEROL SULFATE 2.5; .5 MG/3ML; MG/3ML
3 SOLUTION RESPIRATORY (INHALATION) EVERY 4 HOURS PRN
Status: CANCELLED | OUTPATIENT
Start: 2022-09-06 | End: 2022-09-07

## 2022-09-06 RX ORDER — ASPIRIN 300 MG/1
300 SUPPOSITORY RECTAL ONCE AS NEEDED
Status: CANCELLED | OUTPATIENT
Start: 2022-09-06 | End: 2034-02-02

## 2022-09-06 RX ORDER — POTASSIUM CHLORIDE 29.8 MG/ML
40 INJECTION INTRAVENOUS
Status: CANCELLED | OUTPATIENT
Start: 2022-09-06

## 2022-09-06 RX ORDER — DOCUSATE SODIUM 100 MG/1
100 CAPSULE, LIQUID FILLED ORAL 2 TIMES DAILY
Status: CANCELLED | OUTPATIENT
Start: 2022-09-06

## 2022-09-06 RX ORDER — MUPIROCIN 20 MG/G
1 OINTMENT TOPICAL
Status: CANCELLED | OUTPATIENT
Start: 2022-09-06

## 2022-09-06 RX ORDER — POTASSIUM CHLORIDE 14.9 MG/ML
20 INJECTION INTRAVENOUS
Status: CANCELLED | OUTPATIENT
Start: 2022-09-06

## 2022-09-06 NOTE — PROGRESS NOTES
Subjective:      Patient ID: Torsten Gordillo is a 62 y.o. male.    Chief Complaint: No chief complaint on file.      HPI:  Torsten Gordillo is a 62 y.o. male who presents for pre-op Mvr/R. Medical conditions include COPD, DM2, JULIAN, Fatty Liver, HTN, HLD, dilated cardiomyopathy, prostate cancer following with urology (low risk group per report). Patient reports that he has been having increasing SOB. Can only walk 1/2 block prior to having difficulty. Energy level is at a zero. Occasional dizziness and lower extremity swelling. Some orthopnea but not every night. Also with occasional palpitations at night. No chest pain. No prior strokes, seizures, stents, or sternotomies. Had a DVT last year when admitted for heart failure that resolved with anticoagulation. Quit smoking 3 months ago. Prior to that smoked 1/2ppd for around 40 years. No significant drinking history. Diabetes is controlled by diet and patient does not check them regularly.     Family and social history reviewed    Review of patient's allergies indicates:  No Known Allergies  Past Medical History:   Diagnosis Date    Bronchitis     Cardiomyopathy 5/2/2016    40-45% by SIMONA    COPD (chronic obstructive pulmonary disease)     Diabetes mellitus     DM (diabetes mellitus), type 2 5/11/2016    Fatty liver     Heart failure with reduced ejection fraction, NYHA class II 7/9/2021    EF 40% in 04/2021 NYHA III; sleeps in recliner, PND, rare LE edema    History of DVT (deep vein thrombosis) 2/23/2021    Hyperlipidemia 5/2/2016    Hypertension     Nonrheumatic mitral valve regurgitation 5/31/2021    Severe    Pulmonary arterial hypertension 5/31/2021    Moderate    Suspected sleep apnea 5/2/2016     Past Surgical History:   Procedure Laterality Date    BIOPSY WITH TRANSRECTAL ULTRASOUND (TRUS) GUIDANCE N/A 2/2/2022    Procedure: BIOPSY, WITH TRANSRECTAL US GUIDANCE;  Surgeon: Thomas Ventura II, MD;  Location: [facility];  Service: Urology;  Laterality: N/A;   prostate     COLONOSCOPY N/A 2016    Procedure: COLONOSCOPY;  Surgeon: Faith Harris MD;  Location: [facility] ENDO;  Service: Endoscopy;  Laterality: N/A;    COLONOSCOPY N/A 2021    Procedure: COLONOSCOPY;  Surgeon: Luis Bogran-Reyes, MD;  Location: [facility] ENDO;  Service: Endoscopy;  Laterality: N/A;    CYSTOSCOPY N/A 2022    Procedure: CYSTOSCOPY;  Surgeon: Thomas Ventura II, MD;  Location: [facility] OR;  Service: Urology;  Laterality: N/A;    LEFT HEART CATHETERIZATION Left 2022    Procedure: CATHETERIZATION, HEART, LEFT;  Surgeon: Quinn Duke MD;  Location: [facility] CATH LAB;  Service: Cardiology;  Laterality: Left;     Family History       Problem Relation (Age of Onset)    Diabetes Mother    Hypertension Mother    Prostate cancer Father          Social History     Socioeconomic History    Marital status:     Years of education: 12   Tobacco Use    Smoking status: Former     Packs/day: 1.00     Years: 39.00     Pack years: 39.00     Types: Cigarettes     Start date: 1977     Quit date: 2021     Years since quittin.6    Smokeless tobacco: Never   Substance and Sexual Activity    Alcohol use: No     Alcohol/week: 0.0 standard drinks    Drug use: No    Sexual activity: Not Currently     Current Outpatient Medications   Medication Instructions    acetaminophen (TYLENOL) 650 mg, Oral, Every 6 hours PRN    albuterol (PROVENTIL/VENTOLIN HFA) 90 mcg/actuation inhaler 2 puffs, Inhalation, Every 6 hours PRN, Rescue    aspirin (ECOTRIN) 81 mg, Oral, Daily    atorvastatin (LIPITOR) 80 mg, Oral, Nightly    carvediloL (COREG) 25 mg, Oral, 2 times daily with meals    cetirizine (ZYRTEC) 10 mg, Oral, Daily    diclofenac sodium (VOLTAREN) 2 g, Topical (Top), 4 times daily    ezetimibe (ZETIA) 10 mg, Oral, Daily    finasteride (PROSCAR) 5 mg, Oral, Daily    fluticasone propionate (FLONASE) 50 mcg, Each Nostril, Daily    furosemide (LASIX) 40 mg, Oral, As needed (PRN), 1 up to 4 a day extra, ONLY  as needed for swelling    glimepiride (AMARYL) 4 mg, Oral, With breakfast    lancets Misc Test daily    nitroGLYCERIN (NITROSTAT) 0.4 mg, Sublingual, Every 5 min PRN    sacubitriL-valsartan (ENTRESTO) 49-51 mg per tablet 1 tablet, Oral, 2 times daily    SPIRIVA WITH HANDIHALER 18 mcg, Inhalation, Daily    spironolactone (ALDACTONE) 25 mg, Oral, Daily    tiZANidine (ZANAFLEX) 4 mg, Oral, Every 8 hours PRN    traMADoL (ULTRAM) 50 mg, Oral, Every 8 hours PRN         Review of Systems   Constitutional: Positive for activity change and fatigue.   HENT: Negative for nosebleeds.    Eyes: Negative for visual disturbance.   Respiratory: Positive for cough and shortness of breath.    Cardiovascular: Positive for palpitations and leg swelling. Negative for chest pain.   Gastrointestinal: Negative for nausea.   Musculoskeletal: Negative for gait problem.   Skin: Negative for color change.   Neurological: Positive for dizziness. Negative for seizures.   Hematological: Does not bruise/bleed easily.   Psychiatric/Behavioral: Negative for sleep disturbance.      Objective:   Physical Exam  Vitals reviewed.   Constitutional:       General: He is not in acute distress.     Appearance: He is well-developed. He is not diaphoretic.   HENT:      Head: Normocephalic and atraumatic.   Eyes:      Pupils: Pupils are equal, round, and reactive to light.   Neck:      Vascular: No JVD.   Cardiovascular:      Rate and Rhythm: Normal rate.   Pulmonary:      Effort: Pulmonary effort is normal. No respiratory distress.   Abdominal:      General: There is no distension.   Musculoskeletal:         General: No swelling.      Cervical back: Normal range of motion.   Skin:     Coloration: Skin is not pale.   Neurological:      Mental Status: He is alert and oriented to person, place, and time.   Psychiatric:         Speech: Speech normal.         Behavior: Behavior normal.         Thought Content: Thought content normal.         Judgment: Judgment  normal.        Diagnostic Results: reviewed   TTE 9/6/22  The left ventricle is moderately enlarged with eccentric hypertrophy and moderately decreased systolic function.  The estimated ejection fraction is 38%.  There are segmental left ventricular wall motion abnormalities.  Grade III left ventricular diastolic dysfunction.  Severe left atrial enlargement.  Normal right ventricular size with normal right ventricular systolic function.  Moderate right atrial enlargement.  Severe mitral regurgitation.  Mild to moderate tricuspid regurgitation.  Normal central venous pressure (3 mmHg).  The estimated PA systolic pressure is 46 mmHg.  There is mild-moderate pulmonary hypertension.  The ascending aorta is mildly dilated.    Carotid US 9/6/22  RIGHT SIDE:   Normal - No evidence of plaque in the right internal carotid artery.   Antegrade flow in the right vertebral artery.     LEFT SIDE:   Normal - No evidence of plaque in the left internal carotid artery.   Antegrade flow in the left vertebral artery.      Carotid US 7/28/22  1. No evidence of flowing stenosis regard to the right nor left carotid system.  2. Medialized course involving the bilateral internal carotid arteries.  3. Luminal stenosis in the less than 50% luminal diameter reduction range exclusively on the left given the fairly broad spectral waveform pattern.     SIMONA 7/7/22  The left ventricle (LV) is dilated with low normal to mildly depressed systolic function. LV ejection fraction is 45-50%.  The right ventricle (RV) is normal in size with normal systolic function.   Enlarged atria.  The aortic valve is tri-leaflet with normal mobility. There is mild aortic regurgitation. Ascending aorta is mildly enlarged measuring 3.7 cm.   The mitral valve is normal in structure with normal mobility. There is clear mal-coaptation due to annular dilatation resulting in multiple regurgitant jets; mitral valve regurgitation grade is severe.  Mild-moderate tricuspid  regurgitation.     [facility] 7/7/22  Nonobstructive coronary artery disease  Normal filling pressures     TTE 4/27/22  The left ventricle is moderately enlarged with moderate eccentric hypertrophy and mildly decreased systolic function.  The estimated ejection fraction is about 40-45%.  The quantitatively derived ejection fraction is 41%.  The left ventricular global longitudinal strain is -13%.  There is mild left ventricular global hypokinesis.  Grade II left ventricular diastolic dysfunction.  Severe left atrial enlargement.  Normal right ventricular size with normal right ventricular systolic function.  Severe mitral regurgitation.  The estimated PA systolic pressure is 60 mmHg.  There is moderate to severe pulmonary hypertension.  Mild pulmonic regurgitation.  Moderate tricuspid regurgitation.  Normal central venous pressure (3 mmHg).  Consider SIMONA to better visualize the mitral valve, if clinically indicated.  Strain imaging suggestive of cardiac amyloidosis. Clinical correlation is required.  LV 6.14cm  Assessment:   MR  Plan:   Pre-Op MVr, desire tissue valve if repair not feasible.       CTS Attending Note:    I have personally taken the history and examined this patient and agree with the KAMALJIT's note as stated above.  62-year-old gentleman with severe symptomatic mitral regurgitation.  We plan mitral valve repair.  He reiterated that should repair not be feasible he desires a tissue valve.  His questions have been answered, and he wishes to proceed.

## 2022-09-06 NOTE — H&P (VIEW-ONLY)
Subjective:      Patient ID: Torsten Gordillo is a 62 y.o. male.    Chief Complaint: No chief complaint on file.      HPI:  Torsten Gordillo is a 62 y.o. male who presents for pre-op Mvr/R. Medical conditions include COPD, DM2, JULIAN, Fatty Liver, HTN, HLD, dilated cardiomyopathy, prostate cancer following with urology (low risk group per report). Patient reports that he has been having increasing SOB. Can only walk 1/2 block prior to having difficulty. Energy level is at a zero. Occasional dizziness and lower extremity swelling. Some orthopnea but not every night. Also with occasional palpitations at night. No chest pain. No prior strokes, seizures, stents, or sternotomies. Had a DVT last year when admitted for heart failure that resolved with anticoagulation. Quit smoking 3 months ago. Prior to that smoked 1/2ppd for around 40 years. No significant drinking history. Diabetes is controlled by diet and patient does not check them regularly.     Family and social history reviewed    Review of patient's allergies indicates:  No Known Allergies  Past Medical History:   Diagnosis Date    Bronchitis     Cardiomyopathy 5/2/2016    40-45% by SIMONA    COPD (chronic obstructive pulmonary disease)     Diabetes mellitus     DM (diabetes mellitus), type 2 5/11/2016    Fatty liver     Heart failure with reduced ejection fraction, NYHA class II 7/9/2021    EF 40% in 04/2021 NYHA III; sleeps in recliner, PND, rare LE edema    History of DVT (deep vein thrombosis) 2/23/2021    Hyperlipidemia 5/2/2016    Hypertension     Nonrheumatic mitral valve regurgitation 5/31/2021    Severe    Pulmonary arterial hypertension 5/31/2021    Moderate    Suspected sleep apnea 5/2/2016     Past Surgical History:   Procedure Laterality Date    BIOPSY WITH TRANSRECTAL ULTRASOUND (TRUS) GUIDANCE N/A 2/2/2022    Procedure: BIOPSY, WITH TRANSRECTAL US GUIDANCE;  Surgeon: Thomas Ventura II, MD;  Location: [facility];  Service: Urology;  Laterality: N/A;   prostate     COLONOSCOPY N/A 2016    Procedure: COLONOSCOPY;  Surgeon: Faith Harris MD;  Location: [facility] ENDO;  Service: Endoscopy;  Laterality: N/A;    COLONOSCOPY N/A 2021    Procedure: COLONOSCOPY;  Surgeon: Luis Bogran-Reyes, MD;  Location: [facility] ENDO;  Service: Endoscopy;  Laterality: N/A;    CYSTOSCOPY N/A 2022    Procedure: CYSTOSCOPY;  Surgeon: Thomas Ventura II, MD;  Location: [facility] OR;  Service: Urology;  Laterality: N/A;    LEFT HEART CATHETERIZATION Left 2022    Procedure: CATHETERIZATION, HEART, LEFT;  Surgeon: Quinn Duke MD;  Location: [facility] CATH LAB;  Service: Cardiology;  Laterality: Left;     Family History       Problem Relation (Age of Onset)    Diabetes Mother    Hypertension Mother    Prostate cancer Father          Social History     Socioeconomic History    Marital status:     Years of education: 12   Tobacco Use    Smoking status: Former     Packs/day: 1.00     Years: 39.00     Pack years: 39.00     Types: Cigarettes     Start date: 1977     Quit date: 2021     Years since quittin.6    Smokeless tobacco: Never   Substance and Sexual Activity    Alcohol use: No     Alcohol/week: 0.0 standard drinks    Drug use: No    Sexual activity: Not Currently     Current Outpatient Medications   Medication Instructions    acetaminophen (TYLENOL) 650 mg, Oral, Every 6 hours PRN    albuterol (PROVENTIL/VENTOLIN HFA) 90 mcg/actuation inhaler 2 puffs, Inhalation, Every 6 hours PRN, Rescue    aspirin (ECOTRIN) 81 mg, Oral, Daily    atorvastatin (LIPITOR) 80 mg, Oral, Nightly    carvediloL (COREG) 25 mg, Oral, 2 times daily with meals    cetirizine (ZYRTEC) 10 mg, Oral, Daily    diclofenac sodium (VOLTAREN) 2 g, Topical (Top), 4 times daily    ezetimibe (ZETIA) 10 mg, Oral, Daily    finasteride (PROSCAR) 5 mg, Oral, Daily    fluticasone propionate (FLONASE) 50 mcg, Each Nostril, Daily    furosemide (LASIX) 40 mg, Oral, As needed (PRN), 1 up to 4 a day extra, ONLY  as needed for swelling    glimepiride (AMARYL) 4 mg, Oral, With breakfast    lancets Misc Test daily    nitroGLYCERIN (NITROSTAT) 0.4 mg, Sublingual, Every 5 min PRN    sacubitriL-valsartan (ENTRESTO) 49-51 mg per tablet 1 tablet, Oral, 2 times daily    SPIRIVA WITH HANDIHALER 18 mcg, Inhalation, Daily    spironolactone (ALDACTONE) 25 mg, Oral, Daily    tiZANidine (ZANAFLEX) 4 mg, Oral, Every 8 hours PRN    traMADoL (ULTRAM) 50 mg, Oral, Every 8 hours PRN         Review of Systems   Constitutional: Positive for activity change and fatigue.   HENT: Negative for nosebleeds.    Eyes: Negative for visual disturbance.   Respiratory: Positive for cough and shortness of breath.    Cardiovascular: Positive for palpitations and leg swelling. Negative for chest pain.   Gastrointestinal: Negative for nausea.   Musculoskeletal: Negative for gait problem.   Skin: Negative for color change.   Neurological: Positive for dizziness. Negative for seizures.   Hematological: Does not bruise/bleed easily.   Psychiatric/Behavioral: Negative for sleep disturbance.      Objective:   Physical Exam  Vitals reviewed.   Constitutional:       General: He is not in acute distress.     Appearance: He is well-developed. He is not diaphoretic.   HENT:      Head: Normocephalic and atraumatic.   Eyes:      Pupils: Pupils are equal, round, and reactive to light.   Neck:      Vascular: No JVD.   Cardiovascular:      Rate and Rhythm: Normal rate.   Pulmonary:      Effort: Pulmonary effort is normal. No respiratory distress.   Abdominal:      General: There is no distension.   Musculoskeletal:         General: No swelling.      Cervical back: Normal range of motion.   Skin:     Coloration: Skin is not pale.   Neurological:      Mental Status: He is alert and oriented to person, place, and time.   Psychiatric:         Speech: Speech normal.         Behavior: Behavior normal.         Thought Content: Thought content normal.         Judgment: Judgment  normal.        Diagnostic Results: reviewed   TTE 9/6/22  The left ventricle is moderately enlarged with eccentric hypertrophy and moderately decreased systolic function.  The estimated ejection fraction is 38%.  There are segmental left ventricular wall motion abnormalities.  Grade III left ventricular diastolic dysfunction.  Severe left atrial enlargement.  Normal right ventricular size with normal right ventricular systolic function.  Moderate right atrial enlargement.  Severe mitral regurgitation.  Mild to moderate tricuspid regurgitation.  Normal central venous pressure (3 mmHg).  The estimated PA systolic pressure is 46 mmHg.  There is mild-moderate pulmonary hypertension.  The ascending aorta is mildly dilated.    Carotid US 9/6/22  RIGHT SIDE:   Normal - No evidence of plaque in the right internal carotid artery.   Antegrade flow in the right vertebral artery.     LEFT SIDE:   Normal - No evidence of plaque in the left internal carotid artery.   Antegrade flow in the left vertebral artery.      Carotid US 7/28/22  1. No evidence of flowing stenosis regard to the right nor left carotid system.  2. Medialized course involving the bilateral internal carotid arteries.  3. Luminal stenosis in the less than 50% luminal diameter reduction range exclusively on the left given the fairly broad spectral waveform pattern.     SIMONA 7/7/22  The left ventricle (LV) is dilated with low normal to mildly depressed systolic function. LV ejection fraction is 45-50%.  The right ventricle (RV) is normal in size with normal systolic function.   Enlarged atria.  The aortic valve is tri-leaflet with normal mobility. There is mild aortic regurgitation. Ascending aorta is mildly enlarged measuring 3.7 cm.   The mitral valve is normal in structure with normal mobility. There is clear mal-coaptation due to annular dilatation resulting in multiple regurgitant jets; mitral valve regurgitation grade is severe.  Mild-moderate tricuspid  regurgitation.     [facility] 7/7/22  Nonobstructive coronary artery disease  Normal filling pressures     TTE 4/27/22  The left ventricle is moderately enlarged with moderate eccentric hypertrophy and mildly decreased systolic function.  The estimated ejection fraction is about 40-45%.  The quantitatively derived ejection fraction is 41%.  The left ventricular global longitudinal strain is -13%.  There is mild left ventricular global hypokinesis.  Grade II left ventricular diastolic dysfunction.  Severe left atrial enlargement.  Normal right ventricular size with normal right ventricular systolic function.  Severe mitral regurgitation.  The estimated PA systolic pressure is 60 mmHg.  There is moderate to severe pulmonary hypertension.  Mild pulmonic regurgitation.  Moderate tricuspid regurgitation.  Normal central venous pressure (3 mmHg).  Consider SIMONA to better visualize the mitral valve, if clinically indicated.  Strain imaging suggestive of cardiac amyloidosis. Clinical correlation is required.  LV 6.14cm  Assessment:   MR  Plan:   Pre-Op MVr, desire tissue valve if repair not feasible.       CTS Attending Note:    I have personally taken the history and examined this patient and agree with the KAMALJIT's note as stated above.  62-year-old gentleman with severe symptomatic mitral regurgitation.  We plan mitral valve repair.  He reiterated that should repair not be feasible he desires a tissue valve.  His questions have been answered, and he wishes to proceed.

## 2022-09-07 ENCOUNTER — TELEPHONE (OUTPATIENT)
Dept: CARDIOTHORACIC SURGERY | Facility: CLINIC | Age: 62
End: 2022-09-07
Payer: COMMERCIAL

## 2022-09-07 ENCOUNTER — ANESTHESIA EVENT (OUTPATIENT)
Dept: SURGERY | Facility: HOSPITAL | Age: 62
DRG: 220 | End: 2022-09-07
Payer: COMMERCIAL

## 2022-09-07 NOTE — TELEPHONE ENCOUNTER
Called pt and informed him of arrival time for surgery.  Pt instructed to report to [facility] at 5:00 tomorrow morning.  Pt reminded to perform Hibiclens shower tonight and tomorrow morning, and to become NPO at midnight.  Pt verbalized understanding.

## 2022-09-07 NOTE — ANESTHESIA PREPROCEDURE EVALUATION
[facility]  Anesthesia Pre-Operative Evaluation         Patient Name: Torsten Gordillo  YOB: 1960  MRN: [id number]    SUBJECTIVE:     Pre-operative evaluation for Procedure(s) (LRB):  Valvuloplasty, Mitral (N/A)  REPLACEMENT, MITRAL VALVE (N/A)     09/07/2022    Torsten Gordillo is a 62 y.o. male w/ a significant PMHx of HTN, HLD, CAD, dilated cardiomyopathy, HFrEF (40%), COPD, ex-smoker, JULIAN, T2DM, fatty liver, hx of DVT, and prostate cancer followed by urology. Patient presents with symptomatic severe MR, including increasing SOB with activity (can only walk 1/2 block), occasional dizziness, palpitations, intermittent LE swelling, and orthopnea (sleeps in recliner).      Patient now presents for the above procedure(s).    TTE 9/6/22   The left ventricle is moderately enlarged with eccentric hypertrophy and moderately decreased systolic function.   The estimated ejection fraction is 38%.   There are segmental left ventricular wall motion abnormalities.   Grade III left ventricular diastolic dysfunction.   Severe left atrial enlargement.   Normal right ventricular size with normal right ventricular systolic function.   Moderate right atrial enlargement.   Severe mitral regurgitation.   Mild to moderate tricuspid regurgitation.   Normal central venous pressure (3 mmHg).   The estimated PA systolic pressure is 46 mmHg.   There is mild-moderate pulmonary hypertension.   The ascending aorta is mildly dilated.    SIMONA 7/7/22  1. The left ventricle (LV) is dilated with low normal to mildly depressed systolic function. LV ejection fraction is 45-50%.  2. The right ventricle (RV) is normal in size with normal systolic function.   3. Enlarged atria.  4. The aortic valve is tri-leaflet with normal mobility. There is mild aortic regurgitation. Ascending aorta is mildly enlarged measuring 3.7 cm.   5. The mitral valve is normal in structure with normal mobility. There is clear  mal-coaptation due to annular dilatation resulting in multiple regurgitant jets; mitral valve regurgitation grade is severe.  6. Mild-moderate tricuspid regurgitation.    [facility] 7/7/22   Right dominant circulation      Left Main: Angiographically normal.      Left anterior descending artery: Luminal irregularities.   1st diagonal: Luminal irregularities.   2nd diagonal: Luminal irregularities.      Left circumflex: 30% ostial stenosis. Luminal irregularities.   1st obtuse marginal: Luminal irregularities.   2nd obtuse marginal: Luminal irregularities.      Right coronary artery: 30% mRCA focal stenosis. Luminal irregularities. Small microbubble on final injection. 100% FiO2 administered immediately. No ECG changes or symptoms reported. Extended outpatient monitoring post operatively; hemodynamically stable and asymptomatic.   Posterior descending artery: Angiographically normal.   Posterolateral branch: Luminal irregularities.      LDA: None documented.        Prev airway: None documented.      Drips: None documented.       Patient Active Problem List   Diagnosis    Essential hypertension    Suspected sleep apnea    Cardiomyopathy    Hyperlipidemia    Family history of cardiovascular disease    DM (diabetes mellitus), type 2    Nonrheumatic tricuspid valve regurgitation    History of tobacco abuse    Abnormal EKG    History of DVT (deep vein thrombosis)    Hx of bacterial pneumonia    Left ventricular diastolic dysfunction    Left atrial enlargement    Right atrial enlargement    Nonrheumatic pulmonary valve insufficiency    Nonrheumatic mitral valve regurgitation    Heart failure with reduced ejection fraction, NYHA class II    SEGAL (dyspnea on exertion)    Non-occlusive coronary artery disease    Other chest pain    Bronchitis    Mucopurulent chronic bronchitis    Right hip pain    Chronic obstructive pulmonary disease    Pulmonary hypertension    Orchitis and epididymitis     Prostate CA    Nonrheumatic aortic valve insufficiency    Pre-op examination       Review of patient's allergies indicates:  No Known Allergies    Current Outpatient Medications:  No current facility-administered medications for this encounter.    Current Outpatient Medications:     acetaminophen (TYLENOL) 325 MG tablet, Take 2 tablets (650 mg total) by mouth every 6 (six) hours as needed for Pain., Disp: 20 tablet, Rfl: 0    albuterol (PROVENTIL/VENTOLIN HFA) 90 mcg/actuation inhaler, Inhale 2 puffs into the lungs every 6 (six) hours as needed for Wheezing. Rescue, Disp: 18 g, Rfl: 5    aspirin (ECOTRIN) 81 MG EC tablet, Take 81 mg by mouth once daily., Disp: , Rfl:     atorvastatin (LIPITOR) 80 MG tablet, Take 1 tablet (80 mg total) by mouth every evening., Disp: 90 tablet, Rfl: 3    carvediloL (COREG) 25 MG tablet, Take 1 tablet (25 mg total) by mouth 2 (two) times daily with meals., Disp: 180 tablet, Rfl: 3    cetirizine (ZYRTEC) 10 MG tablet, Take 1 tablet (10 mg total) by mouth once daily., Disp: 90 tablet, Rfl: 3    diclofenac sodium (VOLTAREN) 1 % Gel, Apply 2 g topically 4 (four) times daily. (Patient not taking: Reported on 8/4/2022), Disp: 1 each, Rfl: 5    ezetimibe (ZETIA) 10 mg tablet, Take 1 tablet (10 mg total) by mouth once daily., Disp: 90 tablet, Rfl: 3    finasteride (PROSCAR) 5 mg tablet, Take 1 tablet (5 mg total) by mouth once daily., Disp: 30 tablet, Rfl: 11    fluticasone propionate (FLONASE) 50 mcg/actuation nasal spray, 1 spray (50 mcg total) by Each Nostril route once daily., Disp: 16 g, Rfl: 11    furosemide (LASIX) 40 MG tablet, Take 1 tablet (40 mg total) by mouth as needed. 1 up to 4 a day extra, ONLY as needed for swelling, Disp: 90 tablet, Rfl: 11    glimepiride (AMARYL) 4 MG tablet, Take 1 tablet (4 mg total) by mouth daily with breakfast., Disp: 90 tablet, Rfl: 3    lancets Misc, Test daily, Disp: 100 each, Rfl: 3    nitroGLYCERIN (NITROSTAT) 0.4 MG SL tablet,  Place 1 tablet (0.4 mg total) under the tongue every 5 (five) minutes as needed for Chest pain., Disp: 25 tablet, Rfl: 11    sacubitriL-valsartan (ENTRESTO) 49-51 mg per tablet, Take 1 tablet by mouth 2 (two) times daily. (Patient not taking: Reported on 9/6/2022), Disp: 180 tablet, Rfl: 3    spironolactone (ALDACTONE) 25 MG tablet, Take 1 tablet (25 mg total) by mouth once daily., Disp: 90 tablet, Rfl: 3    tiotropium (SPIRIVA WITH HANDIHALER) 18 mcg inhalation capsule, Inhale 1 capsule (18 mcg total) into the lungs once daily., Disp: 30 capsule, Rfl: 11    tiZANidine (ZANAFLEX) 4 MG tablet, Take 1 tablet (4 mg total) by mouth every 8 (eight) hours as needed (lower back pain or muscle spasm)., Disp: 90 tablet, Rfl: 0    traMADoL (ULTRAM) 50 mg tablet, Take 1 tablet (50 mg total) by mouth every 8 (eight) hours as needed for Pain., Disp: 21 tablet, Rfl: 0    Past Surgical History:   Procedure Laterality Date    BIOPSY WITH TRANSRECTAL ULTRASOUND (TRUS) GUIDANCE N/A 2/2/2022    Procedure: BIOPSY, WITH TRANSRECTAL US GUIDANCE;  Surgeon: Thomas Ventura II, MD;  Location: [facility];  Service: Urology;  Laterality: N/A;  prostate     COLONOSCOPY N/A 7/5/2016    Procedure: COLONOSCOPY;  Surgeon: Faith Harris MD;  Location: [facility];  Service: Endoscopy;  Laterality: N/A;    COLONOSCOPY N/A 11/16/2021    Procedure: COLONOSCOPY;  Surgeon: Luis Bogran-Reyes, MD;  Location: [facility];  Service: Endoscopy;  Laterality: N/A;    CYSTOSCOPY N/A 2/2/2022    Procedure: CYSTOSCOPY;  Surgeon: Thomas Ventura II, MD;  Location: [facility];  Service: Urology;  Laterality: N/A;    LEFT HEART CATHETERIZATION Left 7/7/2022    Procedure: CATHETERIZATION, HEART, LEFT;  Surgeon: Quinn Duke MD;  Location: [facility] CATH LAB;  Service: Cardiology;  Laterality: Left;       Social History     Socioeconomic History    Marital status:     Years of education: 12   Tobacco Use    Smoking status: Former     Packs/day: 1.00      Years: 39.00     Pack years: 39.00     Types: Cigarettes     Start date: 1977     Quit date: 2021     Years since quittin.6    Smokeless tobacco: Never   Substance and Sexual Activity    Alcohol use: No     Alcohol/week: 0.0 standard drinks    Drug use: No    Sexual activity: Not Currently       OBJECTIVE:     Vital Signs Range (Last 24H):         Significant Labs:  Lab Results   Component Value Date    WBC 6.05 2022    HGB 13.8 (L) 2022    HCT 40.9 2022     2022    CHOL 171 2022    TRIG 77 2022    HDL 57 2022    ALT 27 2022    AST 27 2022     2022    K 3.6 2022     2022    CREATININE 1.1 2022    BUN 12 2022    CO2 26 2022    TSH 0.728 2022    PSA 5.2 (H) 2022    INR 1.1 2022    HGBA1C 5.7 (H) 2022       Microbiology Results (last 7 days)     ** No results found for the last 168 hours. **          Diagnostic Studies:    EKG:   Results for orders placed or performed during the hospital encounter of 22   EKG 12-lead    Collection Time: 22 10:19 AM    Narrative    Test Reason : I34.0,Z01.818,    Vent. Rate : 080 BPM     Atrial Rate : 080 BPM     P-R Int : 146 ms          QRS Dur : 088 ms      QT Int : 410 ms       P-R-T Axes : 070 -06 038 degrees     QTc Int : 472 ms    Normal sinus rhythm  Possible Left atrial enlargement  LVH  Abnormal ECG  When compared with ECG of 2022 11:41,  Nonspecific T wave abnormality no longer evident in Anterior-lateral leads  Confirmed by GUICHO CHRIS MD ([id number]) on 2022 10:57:14 AM    Referred By: GUICHO HERCULES           Confirmed By:GUICHO CHRIS MD       2D ECHO:  TTE:  Results for orders placed or performed during the hospital encounter of 22   Echo Saline Bubble? No   Result Value Ref Range    BSA 2.11 m2    TDI SEPTAL 0.07 m/s    LV LATERAL E/E' RATIO 9.09 m/s    LV SEPTAL E/E' RATIO 14.29 m/s    LA WIDTH  5.60 cm    Right Atrial Pressure (from IVC) 3 mmHg    IVC diameter 0.81 cm    RV mid diameter 29.31 cm    QEF 41 %    EF 40 %    Left Ventricular Outflow Tract Mean Velocity 0.373218408667066 cm/s    Left Ventricular Outflow Tract Mean Gradient 2.04 mmHg    Pulmonary Valve Mean Velocity 0.65 m/s    TDI LATERAL 0.11 m/s    PV PEAK VELOCITY 0.84 cm/s    LVIDd 6.14 (A) 3.5 - 6.0 cm    IVS 1.09 0.6 - 1.1 cm    Posterior Wall 1.07 0.6 - 1.1 cm    LVIDs 5.43 (A) 2.1 - 4.0 cm    FS 12 28 - 44 %    LA volume 128.16 cm3    Sinus 3.12 cm    STJ 3.25 cm    Ascending aorta 3.27 cm    LV mass 283.74 g    LA size 4.66 cm    RVDD 2.97 cm    TAPSE 2.14 cm    Right ventricular length in diastole (apical 4-chamber view) 7.51 cm    RV S' 16 cm/s    RA area 19.9 cm2    Left Ventricle Relative Wall Thickness 0.35 cm    AV mean gradient 3 mmHg    AV valve area 3.93 cm2    AV Velocity Ratio 0.81     AV index (prosthetic) 0.81     MV valve area p 1/2 method 3.75 cm2    E/A ratio 1.47     Mean e' 0.09 m/s    E wave deceleration time 202.482784746754531 msec    Pulm vein S/D ratio 0.68     LVOT diameter 2.49 cm    LVOT area 4.9 cm2    LVOT peak nash 0.97 m/s    LVOT peak VTI 17.10 cm    Ao peak nash 1.20 m/s    Ao VTI 21.2 cm    Vn Nyquist 0.33 m/s    Radius 0.94 cm    Mr max nash 6.03 m/s    LVOT stroke volume 83.23 cm3    AV peak gradient 6 mmHg    TV rest pulmonary artery pressure 60 mmHg    E/E' ratio 11.11 m/s    Vn Nyquist MS 0.33 m/s    MV Peak E Nash 1.00 m/s    MR PISA EROA 0.30 cm2    TR Max Nash 3.76 m/s    MV stenosis pressure 1/2 time 58.802322676246216 ms    MV Peak A Nash 0.68 m/s    PV Peak S Nash 0.375961011772831 m/s    PV Peak D Nash 0.77 m/s    LV Systolic Volume 143.22 mL    LV Systolic Volume Index 68.5 mL/m2    LV Diastolic Volume 189.64 mL    LV Diastolic Volume Index 90.74 mL/m2    LA Volume Index 61.3 mL/m2    LV Mass Index 136 g/m2    Right Atrium Volume Systolic 65.03 mL    Echo EF Estimated 24 %    RA Major Axis 5.22 cm     Left Atrium Minor Axis 6.50 cm    Left Atrium Major Axis 5.20 cm    Triscuspid Valve Regurgitation Peak Gradient 57 mmHg    LA Volume Index (Mod) 58.4 mL/m2    LA volume (mod) 122.00 cm3    RA Width 4.30 cm    Narrative    · The left ventricle is moderately enlarged with moderate eccentric   hypertrophy and mildly decreased systolic function.  · The estimated ejection fraction is about 40-45%.  · The quantitatively derived ejection fraction is 41%.  · The left ventricular global longitudinal strain is -13%.  · There is mild left ventricular global hypokinesis.  · Grade II left ventricular diastolic dysfunction.  · Severe left atrial enlargement.  · Normal right ventricular size with normal right ventricular systolic   function.  · Severe mitral regurgitation.  · The estimated PA systolic pressure is 60 mmHg.  · There is moderate to severe pulmonary hypertension.  · Mild pulmonic regurgitation.  · Moderate tricuspid regurgitation.  · Normal central venous pressure (3 mmHg).  · Consider SIMONA to better visualize the mitral valve, if clinically   indicated.  · Strain imaging suggestive of cardiac amyloidosis. Clinical correlation   is required.          SIMONA:  Results for orders placed or performed during the hospital encounter of 07/07/22   Transesophageal echo (SIMONA)   Result Value Ref Range    BSA 2.11 m2    EF 50 %    Vn Nyquist 0.39 m/s    Radius 0.79 cm    Mr max bro 6.41 m/s    Vn Nyquist MS 0.39 m/s    MR PISA EROA 0.24 cm2    Narrative    1. The left ventricle (LV) is dilated with low normal to mildly depressed   systolic function. LV ejection fraction is 45-50%.  2. The right ventricle (RV) is normal in size with normal systolic   function.   3. Enlarged atria.  4. The aortic valve is tri-leaflet with normal mobility. There is mild   aortic regurgitation. Ascending aorta is mildly enlarged measuring 3.7 cm.     5. The mitral valve is normal in structure with normal mobility. There is   clear mal-coaptation due  to annular dilatation resulting in multiple   regurgitant jets; mitral valve regurgitation grade is severe.  6. Mild-moderate tricuspid regurgitation.       ASSESSMENT/PLAN:           Pre-op Assessment    I have reviewed the Patient Summary Reports.     I have reviewed the Nursing Notes. I have reviewed the NPO Status.   I have reviewed the Medications.     Review of Systems  Anesthesia Hx:  No problems with previous Anesthesia Denies Hx of Anesthetic complications  History of prior surgery of interest to airway management or planning: Previous anesthesia: MAC Denies Family Hx of Anesthesia complications.   Denies Personal Hx of Anesthesia complications.   Social:  Social Alcohol Use, Former Smoker 40 pack-year hx; quit 1/2021   Hematology/Oncology:        Current/Recent Cancer. Oncology Comments: Prostate cancer   EENT/Dental:EENT/Dental Normal   Cardiovascular:   Hypertension Valvular problems/Murmurs ( TR mild), MR CAD   Denies CABG/stent.  CHF hyperlipidemia SEGAL NYHA Classification II ECG has been reviewed.    Pulmonary:   COPD Shortness of breath Denies Recent URI. Sleep Apnea    Renal/:  Renal/ Normal     Hepatic/GI:   GERD Liver Disease,    Musculoskeletal:  Musculoskeletal Normal    Neurological:  Neurology Normal  Denies CVA. Denies Seizures.    Endocrine:   Diabetes, type 2    Dermatological:  Skin Normal    Psych:  Psychiatric Normal           Physical Exam  General: Well nourished, Cooperative, Alert and Oriented    Airway:  Mallampati: I / I  Mouth Opening: Normal  TM Distance: Normal, at least 6 cm  Tongue: Normal  Neck ROM: Normal ROM    Dental:  Upper Dentures, Lower Dentures, Edentulous    Chest/Lungs:  Normal Respiratory Rate    Heart:  Rate: Normal        Anesthesia Plan  Type of Anesthesia, risks & benefits discussed:    Anesthesia Type: Gen ETT  Intra-op Monitoring Plan: Standard ASA Monitors, Art Line, Central Line, SIMONA, PA and CO  Post Op Pain Control Plan: multimodal analgesia, IV/PO  Opioids PRN and peripheral nerve block  Induction:  IV  Airway Plan: Direct and Video, Post-Induction  Informed Consent: Informed consent signed with the Patient and all parties understand the risks and agree with anesthesia plan.  All questions answered. Patient consented to blood products? Yes  ASA Score: 4  Day of Surgery Review of History & Physical: H&P Update referred to the surgeon/provider.    Ready For Surgery From Anesthesia Perspective.     .

## 2022-09-08 ENCOUNTER — ANESTHESIA (OUTPATIENT)
Dept: SURGERY | Facility: HOSPITAL | Age: 62
DRG: 220 | End: 2022-09-08
Payer: COMMERCIAL

## 2022-09-08 ENCOUNTER — HOSPITAL ENCOUNTER (INPATIENT)
Facility: HOSPITAL | Age: 62
LOS: 8 days | Discharge: HOME HEALTH | DRG: 220 | End: 2022-09-16
Attending: THORACIC SURGERY (CARDIOTHORACIC VASCULAR SURGERY) | Admitting: THORACIC SURGERY (CARDIOTHORACIC VASCULAR SURGERY)
Payer: COMMERCIAL

## 2022-09-08 DIAGNOSIS — Z95.2 S/P MITRAL VALVE REPLACEMENT: Primary | ICD-10-CM

## 2022-09-08 DIAGNOSIS — Z98.890 S/P TRICUSPID VALVE REPAIR: ICD-10-CM

## 2022-09-08 DIAGNOSIS — I49.9 ARRHYTHMIA: ICD-10-CM

## 2022-09-08 DIAGNOSIS — E78.5 HYPERLIPIDEMIA, UNSPECIFIED HYPERLIPIDEMIA TYPE: ICD-10-CM

## 2022-09-08 DIAGNOSIS — Z98.890 HISTORY OF HEART SURGERY: ICD-10-CM

## 2022-09-08 DIAGNOSIS — Z98.890 S/P MITRAL VALVE REPAIR: Primary | ICD-10-CM

## 2022-09-08 DIAGNOSIS — I34.1 MITRAL VALVE PROLAPSE: ICD-10-CM

## 2022-09-08 DIAGNOSIS — E11.9 CONTROLLED TYPE 2 DIABETES MELLITUS WITHOUT COMPLICATION, WITHOUT LONG-TERM CURRENT USE OF INSULIN: ICD-10-CM

## 2022-09-08 DIAGNOSIS — I34.0 MITRAL REGURGITATION: ICD-10-CM

## 2022-09-08 LAB
ABO + RH BLD: NORMAL
ALBUMIN SERPL BCP-MCNC: 2.8 G/DL (ref 3.5–5.2)
ALBUMIN SERPL BCP-MCNC: 2.9 G/DL (ref 3.5–5.2)
ALLENS TEST: ABNORMAL
ALP SERPL-CCNC: 56 U/L (ref 55–135)
ALP SERPL-CCNC: 60 U/L (ref 55–135)
ALT SERPL W/O P-5'-P-CCNC: 20 U/L (ref 10–44)
ALT SERPL W/O P-5'-P-CCNC: 25 U/L (ref 10–44)
ANION GAP SERPL CALC-SCNC: 6 MMOL/L (ref 8–16)
ANION GAP SERPL CALC-SCNC: 8 MMOL/L (ref 8–16)
ANION GAP SERPL CALC-SCNC: 8 MMOL/L (ref 8–16)
APTT BLDCRRT: 37.6 SEC (ref 21–32)
AST SERPL-CCNC: 64 U/L (ref 10–40)
AST SERPL-CCNC: 67 U/L (ref 10–40)
BASOPHILS # BLD AUTO: 0.05 K/UL (ref 0–0.2)
BASOPHILS NFR BLD: 0.2 % (ref 0–1.9)
BILIRUB SERPL-MCNC: 1.4 MG/DL (ref 0.1–1)
BILIRUB SERPL-MCNC: 2.1 MG/DL (ref 0.1–1)
BLD GP AB SCN CELLS X3 SERPL QL: NORMAL
BLD PROD TYP BPU: NORMAL
BLOOD UNIT EXPIRATION DATE: NORMAL
BLOOD UNIT TYPE CODE: 600
BLOOD UNIT TYPE: NORMAL
BUN SERPL-MCNC: 14 MG/DL (ref 8–23)
BUN SERPL-MCNC: 14 MG/DL (ref 8–23)
BUN SERPL-MCNC: 15 MG/DL (ref 8–23)
CALCIUM SERPL-MCNC: 7.8 MG/DL (ref 8.7–10.5)
CALCIUM SERPL-MCNC: 8.5 MG/DL (ref 8.7–10.5)
CALCIUM SERPL-MCNC: 8.8 MG/DL (ref 8.7–10.5)
CHLORIDE SERPL-SCNC: 114 MMOL/L (ref 95–110)
CHLORIDE SERPL-SCNC: 114 MMOL/L (ref 95–110)
CHLORIDE SERPL-SCNC: 115 MMOL/L (ref 95–110)
CO2 SERPL-SCNC: 18 MMOL/L (ref 23–29)
CO2 SERPL-SCNC: 19 MMOL/L (ref 23–29)
CO2 SERPL-SCNC: 21 MMOL/L (ref 23–29)
CODING SYSTEM: NORMAL
CREAT SERPL-MCNC: 1.1 MG/DL (ref 0.5–1.4)
CREAT SERPL-MCNC: 1.1 MG/DL (ref 0.5–1.4)
CREAT SERPL-MCNC: 1.2 MG/DL (ref 0.5–1.4)
DELSYS: ABNORMAL
DIFFERENTIAL METHOD: ABNORMAL
DISPENSE STATUS: NORMAL
EOSINOPHIL # BLD AUTO: 0 K/UL (ref 0–0.5)
EOSINOPHIL NFR BLD: 0 % (ref 0–8)
ERYTHROCYTE [DISTWIDTH] IN BLOOD BY AUTOMATED COUNT: 13.9 % (ref 11.5–14.5)
ERYTHROCYTE [SEDIMENTATION RATE] IN BLOOD BY WESTERGREN METHOD: 16 MM/H
EST. GFR  (NO RACE VARIABLE): >60 ML/MIN/1.73 M^2
FIO2: 100
FIO2: 100
FIO2: 50
FIO2: 50
FIO2: 80
FIO2: 80
GLUCOSE SERPL-MCNC: 128 MG/DL (ref 70–110)
GLUCOSE SERPL-MCNC: 134 MG/DL (ref 70–110)
GLUCOSE SERPL-MCNC: 138 MG/DL (ref 70–110)
GLUCOSE SERPL-MCNC: 145 MG/DL (ref 70–110)
GLUCOSE SERPL-MCNC: 162 MG/DL (ref 70–110)
GLUCOSE SERPL-MCNC: 170 MG/DL (ref 70–110)
GLUCOSE SERPL-MCNC: 183 MG/DL (ref 70–110)
GLUCOSE SERPL-MCNC: 197 MG/DL (ref 70–110)
GLUCOSE SERPL-MCNC: 200 MG/DL (ref 70–110)
GLUCOSE SERPL-MCNC: 202 MG/DL (ref 70–110)
HCO3 UR-SCNC: 22 MMOL/L (ref 24–28)
HCO3 UR-SCNC: 22.4 MMOL/L (ref 24–28)
HCO3 UR-SCNC: 22.5 MMOL/L (ref 24–28)
HCO3 UR-SCNC: 23.2 MMOL/L (ref 24–28)
HCO3 UR-SCNC: 24.7 MMOL/L (ref 24–28)
HCO3 UR-SCNC: 25 MMOL/L (ref 24–28)
HCO3 UR-SCNC: 25.8 MMOL/L (ref 24–28)
HCO3 UR-SCNC: 26 MMOL/L (ref 24–28)
HCO3 UR-SCNC: 26.1 MMOL/L (ref 24–28)
HCO3 UR-SCNC: 26.2 MMOL/L (ref 24–28)
HCO3 UR-SCNC: 26.8 MMOL/L (ref 24–28)
HCT VFR BLD AUTO: 32.7 % (ref 40–54)
HCT VFR BLD CALC: 27 %PCV (ref 36–54)
HCT VFR BLD CALC: 27 %PCV (ref 36–54)
HCT VFR BLD CALC: 28 %PCV (ref 36–54)
HCT VFR BLD CALC: 29 %PCV (ref 36–54)
HCT VFR BLD CALC: 30 %PCV (ref 36–54)
HCT VFR BLD CALC: 31 %PCV (ref 36–54)
HCT VFR BLD CALC: 32 %PCV (ref 36–54)
HCT VFR BLD CALC: 38 %PCV (ref 36–54)
HGB BLD-MCNC: 11.3 G/DL (ref 14–18)
IMM GRANULOCYTES # BLD AUTO: 0.23 K/UL (ref 0–0.04)
IMM GRANULOCYTES NFR BLD AUTO: 1 % (ref 0–0.5)
INR PPP: 1.3 (ref 0.8–1.2)
LDH SERPL L TO P-CCNC: 0.78 MMOL/L (ref 0.36–1.25)
LDH SERPL L TO P-CCNC: 1.61 MMOL/L (ref 0.5–2.2)
LDH SERPL L TO P-CCNC: 1.62 MMOL/L (ref 0.36–1.25)
LDH SERPL L TO P-CCNC: 1.87 MMOL/L (ref 0.36–1.25)
LDH SERPL L TO P-CCNC: 2 MMOL/L (ref 0.36–1.25)
LDH SERPL L TO P-CCNC: 2.06 MMOL/L (ref 0.36–1.25)
LYMPHOCYTES # BLD AUTO: 1.3 K/UL (ref 1–4.8)
LYMPHOCYTES NFR BLD: 5.7 % (ref 18–48)
MAGNESIUM SERPL-MCNC: 2 MG/DL (ref 1.6–2.6)
MAGNESIUM SERPL-MCNC: 2.4 MG/DL (ref 1.6–2.6)
MCH RBC QN AUTO: 34.1 PG (ref 27–31)
MCHC RBC AUTO-ENTMCNC: 34.6 G/DL (ref 32–36)
MCV RBC AUTO: 99 FL (ref 82–98)
MODE: ABNORMAL
MONOCYTES # BLD AUTO: 1.9 K/UL (ref 0.3–1)
MONOCYTES NFR BLD: 8.3 % (ref 4–15)
NEUTROPHILS # BLD AUTO: 19.2 K/UL (ref 1.8–7.7)
NEUTROPHILS NFR BLD: 84.8 % (ref 38–73)
NRBC BLD-RTO: 0 /100 WBC
PCO2 BLDA: 40.6 MMHG (ref 35–45)
PCO2 BLDA: 40.9 MMHG (ref 35–45)
PCO2 BLDA: 40.9 MMHG (ref 35–45)
PCO2 BLDA: 41.3 MMHG (ref 35–45)
PCO2 BLDA: 41.9 MMHG (ref 35–45)
PCO2 BLDA: 41.9 MMHG (ref 35–45)
PCO2 BLDA: 45.4 MMHG (ref 35–45)
PCO2 BLDA: 49 MMHG (ref 35–45)
PCO2 BLDA: 49.2 MMHG (ref 35–45)
PCO2 BLDA: 50.5 MMHG (ref 35–45)
PCO2 BLDA: 51.6 MMHG (ref 35–45)
PEEP: 5
PEEP: 8
PH SMN: 7.29 [PH] (ref 7.35–7.45)
PH SMN: 7.32 [PH] (ref 7.35–7.45)
PH SMN: 7.33 [PH] (ref 7.35–7.45)
PH SMN: 7.35 [PH] (ref 7.35–7.45)
PH SMN: 7.39 [PH] (ref 7.35–7.45)
PH SMN: 7.4 [PH] (ref 7.35–7.45)
PH SMN: 7.41 [PH] (ref 7.35–7.45)
PHOSPHATE SERPL-MCNC: 2.1 MG/DL (ref 2.7–4.5)
PHOSPHATE SERPL-MCNC: 2.3 MG/DL (ref 2.7–4.5)
PHOSPHATE SERPL-MCNC: 3 MG/DL (ref 2.7–4.5)
PHOSPHATE SERPL-MCNC: 3 MG/DL (ref 2.7–4.5)
PLATELET # BLD AUTO: 101 K/UL (ref 150–450)
PMV BLD AUTO: 9.9 FL (ref 9.2–12.9)
PO2 BLDA: 168 MMHG (ref 80–100)
PO2 BLDA: 334 MMHG (ref 80–100)
PO2 BLDA: 356 MMHG (ref 80–100)
PO2 BLDA: 358 MMHG (ref 80–100)
PO2 BLDA: 372 MMHG (ref 80–100)
PO2 BLDA: 379 MMHG (ref 80–100)
PO2 BLDA: 40 MMHG (ref 40–60)
PO2 BLDA: 413 MMHG (ref 80–100)
PO2 BLDA: 57 MMHG (ref 40–60)
PO2 BLDA: 86 MMHG (ref 80–100)
PO2 BLDA: 94 MMHG (ref 80–100)
POC BE: -2 MMOL/L
POC BE: -3 MMOL/L
POC BE: -4 MMOL/L
POC BE: 0 MMOL/L
POC BE: 1 MMOL/L
POC BE: 1 MMOL/L
POC BE: 2 MMOL/L
POC IONIZED CALCIUM: 1.01 MMOL/L (ref 1.06–1.42)
POC IONIZED CALCIUM: 1.02 MMOL/L (ref 1.06–1.42)
POC IONIZED CALCIUM: 1.03 MMOL/L (ref 1.06–1.42)
POC IONIZED CALCIUM: 1.04 MMOL/L (ref 1.06–1.42)
POC IONIZED CALCIUM: 1.07 MMOL/L (ref 1.06–1.42)
POC IONIZED CALCIUM: 1.09 MMOL/L (ref 1.06–1.42)
POC IONIZED CALCIUM: 1.15 MMOL/L (ref 1.06–1.42)
POC IONIZED CALCIUM: 1.18 MMOL/L (ref 1.06–1.42)
POC IONIZED CALCIUM: 1.33 MMOL/L (ref 1.06–1.42)
POC IONIZED CALCIUM: 1.35 MMOL/L (ref 1.06–1.42)
POC SATURATED O2: 100 % (ref 95–100)
POC SATURATED O2: 68 % (ref 95–100)
POC SATURATED O2: 88 % (ref 95–100)
POC SATURATED O2: 96 % (ref 95–100)
POC SATURATED O2: 97 % (ref 95–100)
POC SATURATED O2: 99 % (ref 95–100)
POC TCO2: 23 MMOL/L (ref 23–27)
POC TCO2: 24 MMOL/L (ref 23–27)
POC TCO2: 24 MMOL/L (ref 24–29)
POC TCO2: 25 MMOL/L (ref 23–27)
POC TCO2: 26 MMOL/L (ref 23–27)
POC TCO2: 26 MMOL/L (ref 24–29)
POC TCO2: 27 MMOL/L (ref 23–27)
POC TCO2: 28 MMOL/L (ref 23–27)
POC TCO2: 28 MMOL/L (ref 23–27)
POCT GLUCOSE: 103 MG/DL (ref 70–110)
POCT GLUCOSE: 128 MG/DL (ref 70–110)
POCT GLUCOSE: 131 MG/DL (ref 70–110)
POCT GLUCOSE: 140 MG/DL (ref 70–110)
POCT GLUCOSE: 143 MG/DL (ref 70–110)
POCT GLUCOSE: 90 MG/DL (ref 70–110)
POCT GLUCOSE: 98 MG/DL (ref 70–110)
POTASSIUM BLD-SCNC: 4.1 MMOL/L (ref 3.5–5.1)
POTASSIUM BLD-SCNC: 4.1 MMOL/L (ref 3.5–5.1)
POTASSIUM BLD-SCNC: 4.3 MMOL/L (ref 3.5–5.1)
POTASSIUM BLD-SCNC: 4.5 MMOL/L (ref 3.5–5.1)
POTASSIUM BLD-SCNC: 4.6 MMOL/L (ref 3.5–5.1)
POTASSIUM BLD-SCNC: 4.8 MMOL/L (ref 3.5–5.1)
POTASSIUM BLD-SCNC: 4.8 MMOL/L (ref 3.5–5.1)
POTASSIUM BLD-SCNC: 4.9 MMOL/L (ref 3.5–5.1)
POTASSIUM BLD-SCNC: 5.1 MMOL/L (ref 3.5–5.1)
POTASSIUM BLD-SCNC: 5.5 MMOL/L (ref 3.5–5.1)
POTASSIUM SERPL-SCNC: 4.4 MMOL/L (ref 3.5–5.1)
POTASSIUM SERPL-SCNC: 5 MMOL/L (ref 3.5–5.1)
POTASSIUM SERPL-SCNC: 5.3 MMOL/L (ref 3.5–5.1)
POTASSIUM SERPL-SCNC: 5.6 MMOL/L (ref 3.5–5.1)
PROT SERPL-MCNC: 4.7 G/DL (ref 6–8.4)
PROT SERPL-MCNC: 4.9 G/DL (ref 6–8.4)
PROTHROMBIN TIME: 13 SEC (ref 9–12.5)
PS: 50
RBC # BLD AUTO: 3.31 M/UL (ref 4.6–6.2)
SAMPLE: ABNORMAL
SAMPLE: NORMAL
SAMPLE: NORMAL
SITE: ABNORMAL
SODIUM BLD-SCNC: 141 MMOL/L (ref 136–145)
SODIUM BLD-SCNC: 142 MMOL/L (ref 136–145)
SODIUM BLD-SCNC: 143 MMOL/L (ref 136–145)
SODIUM BLD-SCNC: 144 MMOL/L (ref 136–145)
SODIUM SERPL-SCNC: 140 MMOL/L (ref 136–145)
SODIUM SERPL-SCNC: 141 MMOL/L (ref 136–145)
SODIUM SERPL-SCNC: 142 MMOL/L (ref 136–145)
SP02: 100
SPONT RATE: 27
UNIT NUMBER: NORMAL
VT: 520
WBC # BLD AUTO: 22.72 K/UL (ref 3.9–12.7)

## 2022-09-08 PROCEDURE — 86850 RBC ANTIBODY SCREEN: CPT | Performed by: PHYSICIAN ASSISTANT

## 2022-09-08 PROCEDURE — 27200953 HC CARDIOPLEGIA SYSTEM

## 2022-09-08 PROCEDURE — 76937 SWAN GANZ LINE: ICD-10-PCS | Mod: 26,,, | Performed by: ANESTHESIOLOGY

## 2022-09-08 PROCEDURE — 85014 HEMATOCRIT: CPT

## 2022-09-08 PROCEDURE — 33430 PR REPLACEMENT OF MITRAL VALVE: ICD-10-PCS | Mod: ,,, | Performed by: THORACIC SURGERY (CARDIOTHORACIC VASCULAR SURGERY)

## 2022-09-08 PROCEDURE — 25000242 PHARM REV CODE 250 ALT 637 W/ HCPCS

## 2022-09-08 PROCEDURE — 83605 ASSAY OF LACTIC ACID: CPT

## 2022-09-08 PROCEDURE — 93503 INSERT/PLACE HEART CATHETER: CPT | Mod: 59,,, | Performed by: ANESTHESIOLOGY

## 2022-09-08 PROCEDURE — 80053 COMPREHEN METABOLIC PANEL: CPT | Performed by: STUDENT IN AN ORGANIZED HEALTH CARE EDUCATION/TRAINING PROGRAM

## 2022-09-08 PROCEDURE — 93312 ECHO TRANSESOPHAGEAL: CPT | Mod: 26,59,, | Performed by: ANESTHESIOLOGY

## 2022-09-08 PROCEDURE — 93320 PR DOPPLER ECHO HEART,COMPLETE: ICD-10-PCS | Mod: 26,59,, | Performed by: ANESTHESIOLOGY

## 2022-09-08 PROCEDURE — 27000191 HC C-V MONITORING

## 2022-09-08 PROCEDURE — 93010 ELECTROCARDIOGRAM REPORT: CPT | Mod: ,,, | Performed by: INTERNAL MEDICINE

## 2022-09-08 PROCEDURE — 63600175 PHARM REV CODE 636 W HCPCS: Performed by: STUDENT IN AN ORGANIZED HEALTH CARE EDUCATION/TRAINING PROGRAM

## 2022-09-08 PROCEDURE — 93503 SWAN GANZ LINE: ICD-10-PCS | Mod: 59,,, | Performed by: ANESTHESIOLOGY

## 2022-09-08 PROCEDURE — 80053 COMPREHEN METABOLIC PANEL: CPT | Mod: 91 | Performed by: THORACIC SURGERY (CARDIOTHORACIC VASCULAR SURGERY)

## 2022-09-08 PROCEDURE — 99291 PR CRITICAL CARE, E/M 30-74 MINUTES: ICD-10-PCS | Mod: ,,, | Performed by: ANESTHESIOLOGY

## 2022-09-08 PROCEDURE — 76937 US GUIDE VASCULAR ACCESS: CPT | Mod: 26,,, | Performed by: ANESTHESIOLOGY

## 2022-09-08 PROCEDURE — 93325 PR DOPPLER COLOR FLOW VELOCITY MAP: ICD-10-PCS | Mod: 26,59,, | Performed by: ANESTHESIOLOGY

## 2022-09-08 PROCEDURE — 37000009 HC ANESTHESIA EA ADD 15 MINS: Performed by: THORACIC SURGERY (CARDIOTHORACIC VASCULAR SURGERY)

## 2022-09-08 PROCEDURE — 63600175 PHARM REV CODE 636 W HCPCS

## 2022-09-08 PROCEDURE — 93312 PR ECHO HEART,TRANSESOPHAGEAL: ICD-10-PCS | Mod: 26,59,, | Performed by: ANESTHESIOLOGY

## 2022-09-08 PROCEDURE — 84132 ASSAY OF SERUM POTASSIUM: CPT

## 2022-09-08 PROCEDURE — C1729 CATH, DRAINAGE: HCPCS | Performed by: THORACIC SURGERY (CARDIOTHORACIC VASCULAR SURGERY)

## 2022-09-08 PROCEDURE — 88305 TISSUE EXAM BY PATHOLOGIST: CPT | Mod: 26,,, | Performed by: PATHOLOGY

## 2022-09-08 PROCEDURE — C9248 INJ, CLEVIDIPINE BUTYRATE: HCPCS | Mod: JG | Performed by: THORACIC SURGERY (CARDIOTHORACIC VASCULAR SURGERY)

## 2022-09-08 PROCEDURE — 94002 VENT MGMT INPAT INIT DAY: CPT

## 2022-09-08 PROCEDURE — D9220A PRA ANESTHESIA: Mod: ,,, | Performed by: ANESTHESIOLOGY

## 2022-09-08 PROCEDURE — 93325 DOPPLER ECHO COLOR FLOW MAPG: CPT | Mod: 26,59,, | Performed by: ANESTHESIOLOGY

## 2022-09-08 PROCEDURE — 25000003 PHARM REV CODE 250: Performed by: STUDENT IN AN ORGANIZED HEALTH CARE EDUCATION/TRAINING PROGRAM

## 2022-09-08 PROCEDURE — 25000003 PHARM REV CODE 250: Performed by: THORACIC SURGERY (CARDIOTHORACIC VASCULAR SURGERY)

## 2022-09-08 PROCEDURE — 82962 GLUCOSE BLOOD TEST: CPT | Performed by: THORACIC SURGERY (CARDIOTHORACIC VASCULAR SURGERY)

## 2022-09-08 PROCEDURE — 99291 CRITICAL CARE FIRST HOUR: CPT | Mod: ,,, | Performed by: ANESTHESIOLOGY

## 2022-09-08 PROCEDURE — 94799 UNLISTED PULMONARY SVC/PX: CPT

## 2022-09-08 PROCEDURE — 93010 EKG 12-LEAD: ICD-10-PCS | Mod: ,,, | Performed by: INTERNAL MEDICINE

## 2022-09-08 PROCEDURE — 27000175 HC ADULT BYPASS PUMP

## 2022-09-08 PROCEDURE — 99900017 HC EXTUBATION W/PARAMETERS (STAT)

## 2022-09-08 PROCEDURE — 88305 TISSUE EXAM BY PATHOLOGIST: CPT | Performed by: PATHOLOGY

## 2022-09-08 PROCEDURE — 83735 ASSAY OF MAGNESIUM: CPT | Mod: 91 | Performed by: THORACIC SURGERY (CARDIOTHORACIC VASCULAR SURGERY)

## 2022-09-08 PROCEDURE — 63600175 PHARM REV CODE 636 W HCPCS: Mod: JG | Performed by: THORACIC SURGERY (CARDIOTHORACIC VASCULAR SURGERY)

## 2022-09-08 PROCEDURE — 33430 REPLACEMENT OF MITRAL VALVE: CPT | Mod: ,,, | Performed by: THORACIC SURGERY (CARDIOTHORACIC VASCULAR SURGERY)

## 2022-09-08 PROCEDURE — 36000712 HC OR TIME LEV V 1ST 15 MIN: Performed by: THORACIC SURGERY (CARDIOTHORACIC VASCULAR SURGERY)

## 2022-09-08 PROCEDURE — 36620 INSERTION CATHETER ARTERY: CPT | Mod: 59,,, | Performed by: ANESTHESIOLOGY

## 2022-09-08 PROCEDURE — 84295 ASSAY OF SERUM SODIUM: CPT

## 2022-09-08 PROCEDURE — 63600175 PHARM REV CODE 636 W HCPCS: Performed by: PHYSICIAN ASSISTANT

## 2022-09-08 PROCEDURE — 82800 BLOOD PH: CPT

## 2022-09-08 PROCEDURE — 27000445 HC TEMPORARY PACEMAKER LEADS

## 2022-09-08 PROCEDURE — 36620 ARTERIAL: ICD-10-PCS | Mod: 59,,, | Performed by: ANESTHESIOLOGY

## 2022-09-08 PROCEDURE — 37799 UNLISTED PX VASCULAR SURGERY: CPT

## 2022-09-08 PROCEDURE — 27202608 HC CANNULA, MISC

## 2022-09-08 PROCEDURE — 27200667 HC PACEMAKER, TEMPORARY MONITORING, PER SHIFT

## 2022-09-08 PROCEDURE — 94640 AIRWAY INHALATION TREATMENT: CPT

## 2022-09-08 PROCEDURE — 25000242 PHARM REV CODE 250 ALT 637 W/ HCPCS: Performed by: PHYSICIAN ASSISTANT

## 2022-09-08 PROCEDURE — 99223 PR INITIAL HOSPITAL CARE,LEVL III: ICD-10-PCS | Mod: ,,, | Performed by: NURSE PRACTITIONER

## 2022-09-08 PROCEDURE — 27100088 HC CELL SAVER

## 2022-09-08 PROCEDURE — 85025 COMPLETE CBC W/AUTO DIFF WBC: CPT | Performed by: PHYSICIAN ASSISTANT

## 2022-09-08 PROCEDURE — 37000008 HC ANESTHESIA 1ST 15 MINUTES: Performed by: THORACIC SURGERY (CARDIOTHORACIC VASCULAR SURGERY)

## 2022-09-08 PROCEDURE — 27201037 HC PRESSURE MONITORING SET UP

## 2022-09-08 PROCEDURE — 83735 ASSAY OF MAGNESIUM: CPT | Performed by: PHYSICIAN ASSISTANT

## 2022-09-08 PROCEDURE — 33464 VALVULOPLASTY TRICUSPID: CPT | Mod: 51,,, | Performed by: THORACIC SURGERY (CARDIOTHORACIC VASCULAR SURGERY)

## 2022-09-08 PROCEDURE — 85730 THROMBOPLASTIN TIME PARTIAL: CPT | Performed by: PHYSICIAN ASSISTANT

## 2022-09-08 PROCEDURE — 25000003 PHARM REV CODE 250: Performed by: ANESTHESIOLOGY

## 2022-09-08 PROCEDURE — 27800595 HC HEART VALVES

## 2022-09-08 PROCEDURE — 84132 ASSAY OF SERUM POTASSIUM: CPT | Performed by: THORACIC SURGERY (CARDIOTHORACIC VASCULAR SURGERY)

## 2022-09-08 PROCEDURE — 86920 COMPATIBILITY TEST SPIN: CPT | Performed by: THORACIC SURGERY (CARDIOTHORACIC VASCULAR SURGERY)

## 2022-09-08 PROCEDURE — 99900035 HC TECH TIME PER 15 MIN (STAT)

## 2022-09-08 PROCEDURE — 82330 ASSAY OF CALCIUM: CPT

## 2022-09-08 PROCEDURE — 99223 1ST HOSP IP/OBS HIGH 75: CPT | Mod: ,,, | Performed by: NURSE PRACTITIONER

## 2022-09-08 PROCEDURE — 84100 ASSAY OF PHOSPHORUS: CPT | Performed by: PHYSICIAN ASSISTANT

## 2022-09-08 PROCEDURE — 93320 DOPPLER ECHO COMPLETE: CPT | Mod: 26,59,, | Performed by: ANESTHESIOLOGY

## 2022-09-08 PROCEDURE — 25000003 PHARM REV CODE 250: Performed by: PHYSICIAN ASSISTANT

## 2022-09-08 PROCEDURE — 93005 ELECTROCARDIOGRAM TRACING: CPT

## 2022-09-08 PROCEDURE — 64450: ICD-10-PCS | Mod: 50,59,, | Performed by: ANESTHESIOLOGY

## 2022-09-08 PROCEDURE — 88305 TISSUE EXAM BY PATHOLOGIST: ICD-10-PCS | Mod: 26,,, | Performed by: PATHOLOGY

## 2022-09-08 PROCEDURE — 27201423 OPTIME MED/SURG SUP & DEVICES STERILE SUPPLY: Performed by: THORACIC SURGERY (CARDIOTHORACIC VASCULAR SURGERY)

## 2022-09-08 PROCEDURE — 64450 NJX AA&/STRD OTHER PN/BRANCH: CPT | Mod: 50,59,, | Performed by: ANESTHESIOLOGY

## 2022-09-08 PROCEDURE — 84100 ASSAY OF PHOSPHORUS: CPT | Mod: 91 | Performed by: THORACIC SURGERY (CARDIOTHORACIC VASCULAR SURGERY)

## 2022-09-08 PROCEDURE — 82803 BLOOD GASES ANY COMBINATION: CPT

## 2022-09-08 PROCEDURE — 36592 COLLECT BLOOD FROM PICC: CPT

## 2022-09-08 PROCEDURE — 20000000 HC ICU ROOM

## 2022-09-08 PROCEDURE — 85610 PROTHROMBIN TIME: CPT | Performed by: PHYSICIAN ASSISTANT

## 2022-09-08 PROCEDURE — 94150 VITAL CAPACITY TEST: CPT

## 2022-09-08 PROCEDURE — 80048 BASIC METABOLIC PNL TOTAL CA: CPT | Mod: XB | Performed by: THORACIC SURGERY (CARDIOTHORACIC VASCULAR SURGERY)

## 2022-09-08 PROCEDURE — 85520 HEPARIN ASSAY: CPT

## 2022-09-08 PROCEDURE — 25000003 PHARM REV CODE 250

## 2022-09-08 PROCEDURE — 33464 PR VALVULOPLAS TRICUS W RING INSERT: ICD-10-PCS | Mod: 51,,, | Performed by: THORACIC SURGERY (CARDIOTHORACIC VASCULAR SURGERY)

## 2022-09-08 PROCEDURE — 94010 BREATHING CAPACITY TEST: CPT

## 2022-09-08 PROCEDURE — 94761 N-INVAS EAR/PLS OXIMETRY MLT: CPT

## 2022-09-08 PROCEDURE — 36000713 HC OR TIME LEV V EA ADD 15 MIN: Performed by: THORACIC SURGERY (CARDIOTHORACIC VASCULAR SURGERY)

## 2022-09-08 PROCEDURE — 27100026 HC SHUNT SENSOR, TERUMO

## 2022-09-08 PROCEDURE — D9220A PRA ANESTHESIA: ICD-10-PCS | Mod: ,,, | Performed by: ANESTHESIOLOGY

## 2022-09-08 PROCEDURE — 99900026 HC AIRWAY MAINTENANCE (STAT)

## 2022-09-08 PROCEDURE — 27000221 HC OXYGEN, UP TO 24 HOURS

## 2022-09-08 DEVICE — VALVE MITRAL MOSAIC 33MM: Type: IMPLANTABLE DEVICE | Site: HEART | Status: FUNCTIONAL

## 2022-09-08 DEVICE — IMPLANTABLE DEVICE: Type: IMPLANTABLE DEVICE | Site: HEART | Status: FUNCTIONAL

## 2022-09-08 RX ORDER — ASPIRIN 300 MG/1
300 SUPPOSITORY RECTAL ONCE AS NEEDED
Status: DISCONTINUED | OUTPATIENT
Start: 2022-09-08 | End: 2022-09-13

## 2022-09-08 RX ORDER — MAGNESIUM SULFATE HEPTAHYDRATE 40 MG/ML
2 INJECTION, SOLUTION INTRAVENOUS
Status: DISCONTINUED | OUTPATIENT
Start: 2022-09-08 | End: 2022-09-09

## 2022-09-08 RX ORDER — METOPROLOL TARTRATE 25 MG/1
25 TABLET, FILM COATED ORAL
Status: COMPLETED | OUTPATIENT
Start: 2022-09-08 | End: 2022-09-08

## 2022-09-08 RX ORDER — NITROGLYCERIN 0.4 MG/1
TABLET SUBLINGUAL
Status: COMPLETED
Start: 2022-09-08 | End: 2022-09-08

## 2022-09-08 RX ORDER — PROPOFOL 10 MG/ML
0-50 INJECTION, EMULSION INTRAVENOUS CONTINUOUS
Status: DISCONTINUED | OUTPATIENT
Start: 2022-09-08 | End: 2022-09-08

## 2022-09-08 RX ORDER — LIDOCAINE HYDROCHLORIDE 10 MG/ML
1 INJECTION, SOLUTION EPIDURAL; INFILTRATION; INTRACAUDAL; PERINEURAL
Status: COMPLETED | OUTPATIENT
Start: 2022-09-08 | End: 2022-09-08

## 2022-09-08 RX ORDER — SODIUM CHLORIDE 0.9 % (FLUSH) 0.9 %
10 SYRINGE (ML) INJECTION
Status: DISCONTINUED | OUTPATIENT
Start: 2022-09-08 | End: 2022-09-16 | Stop reason: HOSPADM

## 2022-09-08 RX ORDER — ASPIRIN 325 MG
325 TABLET ORAL DAILY
Status: DISCONTINUED | OUTPATIENT
Start: 2022-09-08 | End: 2022-09-12

## 2022-09-08 RX ORDER — MIDAZOLAM HYDROCHLORIDE 1 MG/ML
INJECTION INTRAMUSCULAR; INTRAVENOUS
Status: DISCONTINUED | OUTPATIENT
Start: 2022-09-08 | End: 2022-09-08

## 2022-09-08 RX ORDER — SODIUM,POTASSIUM PHOSPHATES 280-250MG
2 POWDER IN PACKET (EA) ORAL ONCE
Status: COMPLETED | OUTPATIENT
Start: 2022-09-09 | End: 2022-09-09

## 2022-09-08 RX ORDER — POTASSIUM CHLORIDE 14.9 MG/ML
INJECTION INTRAVENOUS CONTINUOUS PRN
Status: DISCONTINUED | OUTPATIENT
Start: 2022-09-08 | End: 2022-09-08

## 2022-09-08 RX ORDER — DEXMEDETOMIDINE HYDROCHLORIDE 4 UG/ML
0-1.4 INJECTION, SOLUTION INTRAVENOUS CONTINUOUS
Status: DISCONTINUED | OUTPATIENT
Start: 2022-09-08 | End: 2022-09-08

## 2022-09-08 RX ORDER — SODIUM CHLORIDE 9 MG/ML
INJECTION, SOLUTION INTRAVENOUS CONTINUOUS PRN
Status: DISCONTINUED | OUTPATIENT
Start: 2022-09-08 | End: 2022-09-08

## 2022-09-08 RX ORDER — CEFAZOLIN SODIUM/D5W 2 G/100 ML
2 PLASTIC BAG, INJECTION (ML) INTRAVENOUS
Status: COMPLETED | OUTPATIENT
Start: 2022-09-08 | End: 2022-09-10

## 2022-09-08 RX ORDER — FENTANYL CITRATE 50 UG/ML
INJECTION, SOLUTION INTRAMUSCULAR; INTRAVENOUS
Status: DISCONTINUED | OUTPATIENT
Start: 2022-09-08 | End: 2022-09-08

## 2022-09-08 RX ORDER — HYDROMORPHONE HYDROCHLORIDE 1 MG/ML
0.5 INJECTION, SOLUTION INTRAMUSCULAR; INTRAVENOUS; SUBCUTANEOUS ONCE
Status: COMPLETED | OUTPATIENT
Start: 2022-09-08 | End: 2022-09-08

## 2022-09-08 RX ORDER — IPRATROPIUM BROMIDE AND ALBUTEROL SULFATE 2.5; .5 MG/3ML; MG/3ML
3 SOLUTION RESPIRATORY (INHALATION) EVERY 4 HOURS PRN
Status: ACTIVE | OUTPATIENT
Start: 2022-09-08 | End: 2022-09-09

## 2022-09-08 RX ORDER — VASOPRESSIN 20 [USP'U]/ML
INJECTION, SOLUTION INTRAMUSCULAR; SUBCUTANEOUS
Status: DISCONTINUED | OUTPATIENT
Start: 2022-09-08 | End: 2022-09-08

## 2022-09-08 RX ORDER — POTASSIUM CHLORIDE 29.8 MG/ML
40 INJECTION INTRAVENOUS
Status: DISCONTINUED | OUTPATIENT
Start: 2022-09-08 | End: 2022-09-09

## 2022-09-08 RX ORDER — ACETAMINOPHEN 500 MG
1000 TABLET ORAL EVERY 8 HOURS
Status: DISCONTINUED | OUTPATIENT
Start: 2022-09-08 | End: 2022-09-09

## 2022-09-08 RX ORDER — CEFAZOLIN SODIUM/WATER 2 G/20 ML
2 SYRINGE (ML) INTRAVENOUS
Status: DISCONTINUED | OUTPATIENT
Start: 2022-09-08 | End: 2022-09-08

## 2022-09-08 RX ORDER — NEOSTIGMINE METHYLSULFATE 0.5 MG/ML
INJECTION, SOLUTION INTRAVENOUS
Status: DISCONTINUED | OUTPATIENT
Start: 2022-09-08 | End: 2022-09-08

## 2022-09-08 RX ORDER — MUPIROCIN 20 MG/G
1 OINTMENT TOPICAL
Status: COMPLETED | OUTPATIENT
Start: 2022-09-08 | End: 2022-09-08

## 2022-09-08 RX ORDER — LIDOCAINE HYDROCHLORIDE 20 MG/ML
INJECTION, SOLUTION EPIDURAL; INFILTRATION; INTRACAUDAL; PERINEURAL
Status: DISCONTINUED | OUTPATIENT
Start: 2022-09-08 | End: 2022-09-08

## 2022-09-08 RX ORDER — FENTANYL CITRATE 50 UG/ML
25 INJECTION, SOLUTION INTRAMUSCULAR; INTRAVENOUS
Status: DISCONTINUED | OUTPATIENT
Start: 2022-09-08 | End: 2022-09-09

## 2022-09-08 RX ORDER — PROPOFOL 10 MG/ML
VIAL (ML) INTRAVENOUS
Status: DISCONTINUED | OUTPATIENT
Start: 2022-09-08 | End: 2022-09-08

## 2022-09-08 RX ORDER — ROCURONIUM BROMIDE 10 MG/ML
INJECTION, SOLUTION INTRAVENOUS
Status: DISCONTINUED | OUTPATIENT
Start: 2022-09-08 | End: 2022-09-08

## 2022-09-08 RX ORDER — ASPIRIN 325 MG
325 TABLET ORAL DAILY
Status: DISCONTINUED | OUTPATIENT
Start: 2022-09-08 | End: 2022-09-08

## 2022-09-08 RX ORDER — NITROGLYCERIN 0.4 MG/1
0.4 TABLET SUBLINGUAL EVERY 5 MIN PRN
Status: DISCONTINUED | OUTPATIENT
Start: 2022-09-08 | End: 2022-09-08

## 2022-09-08 RX ORDER — TRANEXAMIC ACID 100 MG/ML
INJECTION, SOLUTION INTRAVENOUS CONTINUOUS PRN
Status: DISCONTINUED | OUTPATIENT
Start: 2022-09-08 | End: 2022-09-08

## 2022-09-08 RX ORDER — DOCUSATE SODIUM 100 MG/1
100 CAPSULE, LIQUID FILLED ORAL 2 TIMES DAILY
Status: DISCONTINUED | OUTPATIENT
Start: 2022-09-08 | End: 2022-09-15

## 2022-09-08 RX ORDER — IPRATROPIUM BROMIDE AND ALBUTEROL SULFATE 2.5; .5 MG/3ML; MG/3ML
3 SOLUTION RESPIRATORY (INHALATION) EVERY 4 HOURS
Status: COMPLETED | OUTPATIENT
Start: 2022-09-08 | End: 2022-09-09

## 2022-09-08 RX ORDER — ONDANSETRON 2 MG/ML
INJECTION INTRAMUSCULAR; INTRAVENOUS
Status: DISCONTINUED | OUTPATIENT
Start: 2022-09-08 | End: 2022-09-08

## 2022-09-08 RX ORDER — FENTANYL CITRATE 50 UG/ML
50 INJECTION, SOLUTION INTRAMUSCULAR; INTRAVENOUS
Status: DISCONTINUED | OUTPATIENT
Start: 2022-09-13 | End: 2022-09-09

## 2022-09-08 RX ORDER — PROTAMINE SULFATE 10 MG/ML
INJECTION, SOLUTION INTRAVENOUS
Status: DISCONTINUED | OUTPATIENT
Start: 2022-09-08 | End: 2022-09-08

## 2022-09-08 RX ORDER — METOCLOPRAMIDE HYDROCHLORIDE 5 MG/ML
5 INJECTION INTRAMUSCULAR; INTRAVENOUS EVERY 6 HOURS PRN
Status: DISCONTINUED | OUTPATIENT
Start: 2022-09-08 | End: 2022-09-16 | Stop reason: HOSPADM

## 2022-09-08 RX ORDER — FAMOTIDINE 10 MG/ML
20 INJECTION INTRAVENOUS 2 TIMES DAILY
Status: DISCONTINUED | OUTPATIENT
Start: 2022-09-08 | End: 2022-09-09

## 2022-09-08 RX ORDER — ACETAMINOPHEN 500 MG
1000 TABLET ORAL ONCE
Status: COMPLETED | OUTPATIENT
Start: 2022-09-08 | End: 2022-09-08

## 2022-09-08 RX ORDER — HEPARIN SODIUM 1000 [USP'U]/ML
INJECTION, SOLUTION INTRAVENOUS; SUBCUTANEOUS
Status: DISCONTINUED | OUTPATIENT
Start: 2022-09-08 | End: 2022-09-08

## 2022-09-08 RX ORDER — ALBUMIN HUMAN 50 G/1000ML
25 SOLUTION INTRAVENOUS ONCE AS NEEDED
Status: DISCONTINUED | OUTPATIENT
Start: 2022-09-08 | End: 2022-09-13

## 2022-09-08 RX ORDER — ACETAMINOPHEN 325 MG/1
650 TABLET ORAL EVERY 4 HOURS PRN
Status: DISCONTINUED | OUTPATIENT
Start: 2022-09-08 | End: 2022-09-08

## 2022-09-08 RX ORDER — TRANEXAMIC ACID 100 MG/ML
INJECTION, SOLUTION INTRAVENOUS
Status: DISCONTINUED | OUTPATIENT
Start: 2022-09-08 | End: 2022-09-08

## 2022-09-08 RX ORDER — NITROGLYCERIN 5 MG/ML
INJECTION, SOLUTION INTRAVENOUS
Status: DISCONTINUED | OUTPATIENT
Start: 2022-09-08 | End: 2022-09-08

## 2022-09-08 RX ORDER — KETAMINE HCL IN 0.9 % NACL 50 MG/5 ML
SYRINGE (ML) INTRAVENOUS
Status: DISCONTINUED | OUTPATIENT
Start: 2022-09-08 | End: 2022-09-08

## 2022-09-08 RX ORDER — PROPOFOL 10 MG/ML
VIAL (ML) INTRAVENOUS CONTINUOUS PRN
Status: DISCONTINUED | OUTPATIENT
Start: 2022-09-08 | End: 2022-09-08

## 2022-09-08 RX ORDER — SODIUM CHLORIDE 9 MG/ML
INJECTION, SOLUTION INTRAVENOUS CONTINUOUS
Status: DISCONTINUED | OUTPATIENT
Start: 2022-09-08 | End: 2022-09-14

## 2022-09-08 RX ORDER — ASPIRIN 325 MG
325 TABLET ORAL ONCE
Status: DISCONTINUED | OUTPATIENT
Start: 2022-09-08 | End: 2022-09-08

## 2022-09-08 RX ORDER — BISACODYL 10 MG
10 SUPPOSITORY, RECTAL RECTAL DAILY PRN
Status: DISCONTINUED | OUTPATIENT
Start: 2022-09-08 | End: 2022-09-16 | Stop reason: HOSPADM

## 2022-09-08 RX ORDER — ONDANSETRON 2 MG/ML
4 INJECTION INTRAMUSCULAR; INTRAVENOUS EVERY 12 HOURS PRN
Status: DISCONTINUED | OUTPATIENT
Start: 2022-09-08 | End: 2022-09-16 | Stop reason: HOSPADM

## 2022-09-08 RX ORDER — POTASSIUM CHLORIDE 14.9 MG/ML
20 INJECTION INTRAVENOUS
Status: DISCONTINUED | OUTPATIENT
Start: 2022-09-08 | End: 2022-09-09

## 2022-09-08 RX ORDER — CEFAZOLIN SODIUM 1 G/3ML
INJECTION, POWDER, FOR SOLUTION INTRAMUSCULAR; INTRAVENOUS
Status: DISCONTINUED | OUTPATIENT
Start: 2022-09-08 | End: 2022-09-08

## 2022-09-08 RX ORDER — BUPIVACAINE HYDROCHLORIDE 5 MG/ML
INJECTION, SOLUTION EPIDURAL; INTRACAUDAL
Status: COMPLETED | OUTPATIENT
Start: 2022-09-08 | End: 2022-09-08

## 2022-09-08 RX ORDER — EPINEPHRINE 0.1 MG/ML
INJECTION INTRAVENOUS
Status: DISCONTINUED | OUTPATIENT
Start: 2022-09-08 | End: 2022-09-08

## 2022-09-08 RX ORDER — MILRINONE LACTATE 0.2 MG/ML
0.12 INJECTION, SOLUTION INTRAVENOUS CONTINUOUS
Status: DISCONTINUED | OUTPATIENT
Start: 2022-09-08 | End: 2022-09-12

## 2022-09-08 RX ORDER — MUPIROCIN 20 MG/G
1 OINTMENT TOPICAL 2 TIMES DAILY
Status: COMPLETED | OUTPATIENT
Start: 2022-09-08 | End: 2022-09-13

## 2022-09-08 RX ORDER — POLYETHYLENE GLYCOL 3350 17 G/17G
17 POWDER, FOR SOLUTION ORAL DAILY
Status: DISCONTINUED | OUTPATIENT
Start: 2022-09-08 | End: 2022-09-16 | Stop reason: HOSPADM

## 2022-09-08 RX ORDER — MAGNESIUM SULFATE HEPTAHYDRATE 40 MG/ML
4 INJECTION, SOLUTION INTRAVENOUS
Status: DISCONTINUED | OUTPATIENT
Start: 2022-09-08 | End: 2022-09-09

## 2022-09-08 RX ORDER — DEXTROSE MONOHYDRATE, SODIUM CHLORIDE, AND POTASSIUM CHLORIDE 50; 1.49; 4.5 G/1000ML; G/1000ML; G/1000ML
INJECTION, SOLUTION INTRAVENOUS CONTINUOUS
Status: DISCONTINUED | OUTPATIENT
Start: 2022-09-08 | End: 2022-09-13

## 2022-09-08 RX ORDER — POTASSIUM CHLORIDE 14.9 MG/ML
60 INJECTION INTRAVENOUS
Status: DISCONTINUED | OUTPATIENT
Start: 2022-09-08 | End: 2022-09-09

## 2022-09-08 RX ORDER — OXYCODONE HYDROCHLORIDE 10 MG/1
10 TABLET ORAL EVERY 4 HOURS PRN
Status: DISCONTINUED | OUTPATIENT
Start: 2022-09-08 | End: 2022-09-09

## 2022-09-08 RX ORDER — OXYCODONE HYDROCHLORIDE 5 MG/1
5 TABLET ORAL EVERY 4 HOURS PRN
Status: DISCONTINUED | OUTPATIENT
Start: 2022-09-08 | End: 2022-09-09

## 2022-09-08 RX ADMIN — IPRATROPIUM BROMIDE AND ALBUTEROL SULFATE 3 ML: 2.5; .5 SOLUTION RESPIRATORY (INHALATION) at 11:09

## 2022-09-08 RX ADMIN — NITROGLYCERIN 0.4 MG: 0.4 TABLET, ORALLY DISINTEGRATING SUBLINGUAL at 06:09

## 2022-09-08 RX ADMIN — ASPIRIN 325 MG ORAL TABLET 325 MG: 325 PILL ORAL at 06:09

## 2022-09-08 RX ADMIN — HEPARIN SODIUM 26000 UNITS: 1000 INJECTION, SOLUTION INTRAVENOUS; SUBCUTANEOUS at 08:09

## 2022-09-08 RX ADMIN — EPINEPHRINE 20 MCG: 0.1 INJECTION, SOLUTION ENDOTRACHEAL; INTRACARDIAC; INTRAVENOUS at 08:09

## 2022-09-08 RX ADMIN — MIDAZOLAM HYDROCHLORIDE 2 MG: 1 INJECTION, SOLUTION INTRAMUSCULAR; INTRAVENOUS at 07:09

## 2022-09-08 RX ADMIN — IPRATROPIUM BROMIDE AND ALBUTEROL SULFATE 3 ML: 2.5; .5 SOLUTION RESPIRATORY (INHALATION) at 08:09

## 2022-09-08 RX ADMIN — VASOPRESSIN 1 UNITS: 20 INJECTION INTRAVENOUS at 08:09

## 2022-09-08 RX ADMIN — MILRINONE LACTATE IN DEXTROSE 0.5 MCG/KG/MIN: 200 INJECTION, SOLUTION INTRAVENOUS at 02:09

## 2022-09-08 RX ADMIN — LIDOCAINE HYDROCHLORIDE 1 MG: 10 INJECTION, SOLUTION EPIDURAL; INFILTRATION; INTRACAUDAL; PERINEURAL at 05:09

## 2022-09-08 RX ADMIN — VASOPRESSIN 2 UNITS: 20 INJECTION INTRAVENOUS at 12:09

## 2022-09-08 RX ADMIN — ROCURONIUM BROMIDE 100 MG: 10 INJECTION, SOLUTION INTRAVENOUS at 07:09

## 2022-09-08 RX ADMIN — VASOPRESSIN 2 UNITS: 20 INJECTION INTRAVENOUS at 08:09

## 2022-09-08 RX ADMIN — SODIUM CHLORIDE, SODIUM GLUCONATE, SODIUM ACETATE, POTASSIUM CHLORIDE, MAGNESIUM CHLORIDE, SODIUM PHOSPHATE, DIBASIC, AND POTASSIUM PHOSPHATE: .53; .5; .37; .037; .03; .012; .00082 INJECTION, SOLUTION INTRAVENOUS at 07:09

## 2022-09-08 RX ADMIN — PROTAMINE SULFATE 250 MG: 10 INJECTION, SOLUTION INTRAVENOUS at 12:09

## 2022-09-08 RX ADMIN — NITROGLYCERIN 25 MCG: 5 INJECTION, SOLUTION INTRAVENOUS at 12:09

## 2022-09-08 RX ADMIN — Medication 20 MG: at 08:09

## 2022-09-08 RX ADMIN — NITROGLYCERIN 0.4 MG: 0.4 TABLET SUBLINGUAL at 06:09

## 2022-09-08 RX ADMIN — SODIUM CHLORIDE 5 UNITS/HR: 9 INJECTION, SOLUTION INTRAVENOUS at 09:09

## 2022-09-08 RX ADMIN — DEXMEDETOMIDINE HYDROCHLORIDE 0.4 MCG/KG/HR: 4 INJECTION INTRAVENOUS at 03:09

## 2022-09-08 RX ADMIN — MUPIROCIN 1 G: 20 OINTMENT TOPICAL at 05:09

## 2022-09-08 RX ADMIN — CEFAZOLIN 2 G: 330 INJECTION, POWDER, FOR SOLUTION INTRAMUSCULAR; INTRAVENOUS at 11:09

## 2022-09-08 RX ADMIN — SODIUM CHLORIDE: 0.9 INJECTION, SOLUTION INTRAVENOUS at 05:09

## 2022-09-08 RX ADMIN — VASOPRESSIN 2 UNITS: 20 INJECTION INTRAVENOUS at 07:09

## 2022-09-08 RX ADMIN — FENTANYL CITRATE 50 MCG: 50 INJECTION, SOLUTION INTRAMUSCULAR; INTRAVENOUS at 09:09

## 2022-09-08 RX ADMIN — ACETAMINOPHEN 1000 MG: 500 TABLET ORAL at 05:09

## 2022-09-08 RX ADMIN — CALCIUM CHLORIDE 500 MG: 100 INJECTION, SOLUTION INTRAVENOUS at 01:09

## 2022-09-08 RX ADMIN — PROPOFOL 140 MG: 10 INJECTION, EMULSION INTRAVENOUS at 07:09

## 2022-09-08 RX ADMIN — CEFAZOLIN 2 G: 330 INJECTION, POWDER, FOR SOLUTION INTRAMUSCULAR; INTRAVENOUS at 07:09

## 2022-09-08 RX ADMIN — POTASSIUM CHLORIDE: 200 INJECTION, SOLUTION INTRAVENOUS at 01:09

## 2022-09-08 RX ADMIN — DEXTROSE 2 G: 50 INJECTION, SOLUTION INTRAVENOUS at 07:09

## 2022-09-08 RX ADMIN — PROPOFOL 50 MG: 10 INJECTION, EMULSION INTRAVENOUS at 12:09

## 2022-09-08 RX ADMIN — MIDAZOLAM HYDROCHLORIDE 2 MG: 1 INJECTION, SOLUTION INTRAMUSCULAR; INTRAVENOUS at 08:09

## 2022-09-08 RX ADMIN — EPINEPHRINE 0.05 MCG/KG/MIN: 1 INJECTION INTRAMUSCULAR; INTRAVENOUS; SUBCUTANEOUS at 07:09

## 2022-09-08 RX ADMIN — FAMOTIDINE 20 MG: 10 INJECTION, SOLUTION INTRAVENOUS at 09:09

## 2022-09-08 RX ADMIN — IPRATROPIUM BROMIDE AND ALBUTEROL SULFATE 3 ML: 2.5; .5 SOLUTION RESPIRATORY (INHALATION) at 03:09

## 2022-09-08 RX ADMIN — FENTANYL CITRATE 50 MCG: 50 INJECTION, SOLUTION INTRAMUSCULAR; INTRAVENOUS at 11:09

## 2022-09-08 RX ADMIN — GLYCOPYRROLATE 0.6 MG: 0.2 INJECTION, SOLUTION INTRAMUSCULAR; INTRAVITREAL at 02:09

## 2022-09-08 RX ADMIN — NEOSTIGMINE METHYLSULFATE 5 MG: 0.5 INJECTION INTRAVENOUS at 02:09

## 2022-09-08 RX ADMIN — MILRINONE LACTATE IN DEXTROSE 0.5 MCG/KG/MIN: 200 INJECTION, SOLUTION INTRAVENOUS at 10:09

## 2022-09-08 RX ADMIN — EPINEPHRINE 20 MCG: 0.1 INJECTION, SOLUTION ENDOTRACHEAL; INTRACARDIAC; INTRAVENOUS at 01:09

## 2022-09-08 RX ADMIN — VASOPRESSIN 0.04 UNITS/MIN: 20 INJECTION INTRAVENOUS at 07:09

## 2022-09-08 RX ADMIN — PROPOFOL 30 MCG/KG/MIN: 10 INJECTION, EMULSION INTRAVENOUS at 01:09

## 2022-09-08 RX ADMIN — HYDROMORPHONE HYDROCHLORIDE 0.5 MG: 1 INJECTION, SOLUTION INTRAMUSCULAR; INTRAVENOUS; SUBCUTANEOUS at 11:09

## 2022-09-08 RX ADMIN — ROCURONIUM BROMIDE 50 MG: 10 INJECTION, SOLUTION INTRAVENOUS at 12:09

## 2022-09-08 RX ADMIN — CLEVIPIDINE 2 MG/HR: 0.5 EMULSION INTRAVENOUS at 02:09

## 2022-09-08 RX ADMIN — MILRINONE LACTATE IN DEXTROSE 0.5 MCG/KG/MIN: 200 INJECTION, SOLUTION INTRAVENOUS at 03:09

## 2022-09-08 RX ADMIN — LIDOCAINE HYDROCHLORIDE 80 MG: 20 INJECTION, SOLUTION EPIDURAL; INFILTRATION; INTRACAUDAL; PERINEURAL at 07:09

## 2022-09-08 RX ADMIN — DEXTROSE MONOHYDRATE, SODIUM CHLORIDE, AND POTASSIUM CHLORIDE 5 ML/HR: 50; 4.5; 1.49 INJECTION, SOLUTION INTRAVENOUS at 02:09

## 2022-09-08 RX ADMIN — TRANEXAMIC ACID 850 MG: 100 INJECTION, SOLUTION INTRAVENOUS at 07:09

## 2022-09-08 RX ADMIN — CALCIUM CHLORIDE 500 MG: 100 INJECTION, SOLUTION INTRAVENOUS at 11:09

## 2022-09-08 RX ADMIN — METOPROLOL TARTRATE 25 MG: 25 TABLET, FILM COATED ORAL at 05:09

## 2022-09-08 RX ADMIN — Medication 30 MG: at 07:09

## 2022-09-08 RX ADMIN — EPINEPHRINE 20 MCG: 0.1 INJECTION, SOLUTION ENDOTRACHEAL; INTRACARDIAC; INTRAVENOUS at 12:09

## 2022-09-08 RX ADMIN — BUPIVACAINE HYDROCHLORIDE 30 ML: 5 INJECTION, SOLUTION EPIDURAL; INTRACAUDAL; PERINEURAL at 07:09

## 2022-09-08 RX ADMIN — FENTANYL CITRATE 100 MCG: 50 INJECTION, SOLUTION INTRAMUSCULAR; INTRAVENOUS at 07:09

## 2022-09-08 RX ADMIN — SODIUM CHLORIDE, SODIUM GLUCONATE, SODIUM ACETATE, POTASSIUM CHLORIDE, MAGNESIUM CHLORIDE, SODIUM PHOSPHATE, DIBASIC, AND POTASSIUM PHOSPHATE: .53; .5; .37; .037; .03; .012; .00082 INJECTION, SOLUTION INTRAVENOUS at 12:09

## 2022-09-08 RX ADMIN — ROCURONIUM BROMIDE 50 MG: 10 INJECTION, SOLUTION INTRAVENOUS at 09:09

## 2022-09-08 RX ADMIN — EPINEPHRINE 20 MCG: 0.1 INJECTION, SOLUTION ENDOTRACHEAL; INTRACARDIAC; INTRAVENOUS at 07:09

## 2022-09-08 RX ADMIN — MUPIROCIN 1 G: 20 OINTMENT TOPICAL at 09:09

## 2022-09-08 RX ADMIN — SODIUM CHLORIDE: 9 INJECTION, SOLUTION INTRAVENOUS at 07:09

## 2022-09-08 RX ADMIN — SODIUM CHLORIDE 1 MG/KG/HR: 9 INJECTION, SOLUTION INTRAVENOUS at 07:09

## 2022-09-08 RX ADMIN — ROCURONIUM BROMIDE 50 MG: 10 INJECTION, SOLUTION INTRAVENOUS at 10:09

## 2022-09-08 RX ADMIN — HEPARIN SODIUM 5000 UNITS: 1000 INJECTION, SOLUTION INTRAVENOUS; SUBCUTANEOUS at 08:09

## 2022-09-08 NOTE — ANESTHESIA PROCEDURE NOTES
Arterial    Diagnosis: mitral regurgitation    Patient location during procedure: done in OR  Procedure start time: 9/8/2022 7:07 AM  Timeout: 9/8/2022 7:06 AM  Procedure end time: 9/8/2022 7:11 AM    Staffing  Authorizing Provider: Yaquelin Ann MD  Performing Provider: Hilary Rodas MD    Anesthesiologist was present at the time of the procedure.    Preanesthetic Checklist  Completed: patient identified, IV checked, site marked, risks and benefits discussed, surgical consent, monitors and equipment checked, pre-op evaluation, timeout performed and anesthesia consent givenArterial  Skin Prep: chlorhexidine gluconate  Local Infiltration: none and lidocaine  Orientation: left  Location: radial    Catheter Size: 20 G  Catheter placement by Ultrasound guidance. Heme positive aspiration all ports.   Vessel Caliber: medium, patent, compressibility normal  Needle advanced into vessel with real time Ultrasound guidance.Insertion Attempts: 1  Assessment  Dressing: secured with tape and tegaderm  Patient: Tolerated well

## 2022-09-08 NOTE — BRIEF OP NOTE
[facility] - Surgical Intensive Care  Cardiothoracic Surgery  Operative Note    SUMMARY     Date of Procedure: 9/8/2022     Procedure: Procedure(s) (LRB):  1)  REPLACEMENT, MITRAL VALVE with a 33 mm Medtronic Mosaic porcine bioprosthesis  01242    2)  REPAIR, TRICUSPID VALVE, WITH a 28 mm Medtronic Contour band annuloplasty  [id number]    Surgeon(s) and Role:     * Guicho Hercules MD - Primary     * Doreen Judd MD - Fellow    Assisting Surgeon: None    Pre-Operative Diagnosis: Nonrheumatic mitral valve regurgitation [I34.0]    Post-Operative Diagnosis: Post-Op Diagnosis Codes:     * Nonrheumatic mitral valve regurgitation [I34.0]    Anesthesia: General    Operative Findings (including complications, if any): Thickening of anterior mitral leaflet with tethering of posterior leaflet.       Estimated Blood Loss (EBL): 100 mL         Implants:   Implant Name Type Inv. Item Serial No.  Lot No. LRB No. Used Action   VALVE MITRAL MOSAIC 33MM - [id number]  VALVE MITRAL MOSAIC 33MM [id number] [facility]  N/A 1 Implanted   Medtronic Contour 3D Annuloplasty Ring   [id number] [employer] [id number] N/A 1 Implanted       Specimens:   Specimen (24h ago, onward)       Start     Ordered    09/08/22 1148  Specimen to Pathology, Surgery Cardiovascular  Once        Comments: Pre-op Diagnosis: Nonrheumatic mitral valve regurgitation [I34.0]Procedure(s):REPLACEMENT, MITRAL VALVEREPAIR, TRICUSPID VALVE, WITH RING INSERTION Number of specimens: 1Name of specimens: 1) Anterior leaflets of mitral valve - PERM     References:    Click here for ordering Quick Tip   Question Answer Comment   Procedure Type: Cardiovascular    Specimen Class: Routine/Screening    Which provider would you like to cc? GUICHO HERCULES    Release to patient Immediate        09/08/22 1148                   Attestation:  I was present and scrubbed for the procedure.

## 2022-09-08 NOTE — SUBJECTIVE & OBJECTIVE
Follow-up For: Procedure(s) (LRB):  REPLACEMENT, MITRAL VALVE (N/A)  REPAIR, TRICUSPID VALVE, WITH RING INSERTION (N/A)    Post-Operative Day: Day of Surgery     Past Medical History:   Diagnosis Date    Bronchitis     Cardiomyopathy 5/2/2016    40-45% by SIMONA    COPD (chronic obstructive pulmonary disease)     Diabetes mellitus     DM (diabetes mellitus), type 2 5/11/2016    Fatty liver     Heart failure with reduced ejection fraction, NYHA class II 7/9/2021    EF 40% in 04/2021 NYHA III; sleeps in recliner, PND, rare LE edema    History of DVT (deep vein thrombosis) 2/23/2021    Hyperlipidemia 5/2/2016    Hypertension     Nonrheumatic mitral valve regurgitation 5/31/2021    Severe    Pulmonary arterial hypertension 5/31/2021    Moderate    Suspected sleep apnea 5/2/2016       Past Surgical History:   Procedure Laterality Date    BIOPSY WITH TRANSRECTAL ULTRASOUND (TRUS) GUIDANCE N/A 2/2/2022    Procedure: BIOPSY, WITH TRANSRECTAL US GUIDANCE;  Surgeon: Thomas Ventura II, MD;  Location: [facility];  Service: Urology;  Laterality: N/A;  prostate     COLONOSCOPY N/A 7/5/2016    Procedure: COLONOSCOPY;  Surgeon: Faith Harris MD;  Location: [facility];  Service: Endoscopy;  Laterality: N/A;    COLONOSCOPY N/A 11/16/2021    Procedure: COLONOSCOPY;  Surgeon: Luis Bogran-Reyes, MD;  Location: [facility];  Service: Endoscopy;  Laterality: N/A;    CYSTOSCOPY N/A 2/2/2022    Procedure: CYSTOSCOPY;  Surgeon: Thomas Ventura II, MD;  Location: [facility];  Service: Urology;  Laterality: N/A;    LEFT HEART CATHETERIZATION Left 7/7/2022    Procedure: CATHETERIZATION, HEART, LEFT;  Surgeon: Quinn Duke MD;  Location: [facility] CATH LAB;  Service: Cardiology;  Laterality: Left;       Review of patient's allergies indicates:  No Known Allergies    Family History       Problem Relation (Age of Onset)    Diabetes Mother    Hypertension Mother    Prostate cancer Father          Tobacco Use    Smoking status: Former     Packs/day: 1.00      Years: 39.00     Pack years: 39.00     Types: Cigarettes     Start date: 1977     Quit date: 2021     Years since quittin.6    Smokeless tobacco: Never   Substance and Sexual Activity    Alcohol use: No     Alcohol/week: 0.0 standard drinks    Drug use: No    Sexual activity: Not Currently      Review of Systems   Unable to perform ROS: Intubated   Objective:     Vital Signs (Most Recent):  Temp: (!) 95.6 °F (35.3 °C) (22 1515)  Pulse: 73 (22 1700)  Resp: (!) 22 (22 1700)  BP: 97/66 (22 1700)  SpO2: 100 % (22 170)   Vital Signs (24h Range):  Temp:  [95.6 °F (35.3 °C)-97.8 °F (36.6 °C)] 95.6 °F (35.3 °C)  Pulse:  [70-98] 73  Resp:  [0-26] 22  SpO2:  [67 %-100 %] 100 %  BP: ()/() 97/66  Arterial Line BP: ()/(59-77) 96/59     Weight: 85.7 kg (189 lb 0.7 oz)  Body mass index is 26.37 kg/m².      Intake/Output Summary (Last 24 hours) at 2022 1723  Last data filed at 2022 1700  Gross per 24 hour   Intake 2916.05 ml   Output 1875 ml   Net 1041.05 ml       Physical Exam  Vitals and nursing note reviewed.   Constitutional:       Appearance: Normal appearance.      Comments: Intubated and sedated   Cardiovascular:      Comments: Midline incision cdi  AV paced at 70 bpm  Pulmonary:      Comments: intubated  Abdominal:      General: Abdomen is flat.      Palpations: Abdomen is soft.   Skin:     General: Skin is warm and dry.       Vents:  Vent Mode: Spont (22)  Ventilator Initiated: Yes (22 1356)  Set Rate: 16 BPM (22)  Vt Set: 520 mL (22)  Pressure Support: 8 cmH20 (22)  PEEP/CPAP: 5 cmH20 (22)  Oxygen Concentration (%): 50 (22 1700)  Peak Airway Pressure: 14 cmH2O (22)  Plateau Pressure: 0 cmH20 (22)  Total Ve: 13.1 mL (22)  Negative Inspiratory Force (cm H2O): 0 (22)  F/VT Ratio<105 (RSBI): (!) 45.94 (22)    Lines/Drains/Airways        Central Venous Catheter Line  Duration             Pulmonary Artery Catheter Assessment  09/08/22 0717 <1 day              Drain  Duration                  NG/OG Tube 09/08/22 1421 orogastric Center mouth <1 day         Urethral Catheter 09/08/22 0723 Non-latex;Straight-tip;Temperature probe 14 Fr. <1 day         Y Chest Tube 1 and 2 09/08/22 1209 1 Anterior Mediastinal 19 Fr. 2 Posterior Mediastinal 19 Fr. <1 day              Airway  Duration                  Airway - Non-Surgical 09/08/22 0716 <1 day              Arterial Line  Duration             Arterial Line 09/08/22 0707 Left Radial <1 day              Line  Duration                  Pacer Wires 09/08/22 1206 <1 day              Peripheral Intravenous Line  Duration                  Peripheral IV - Single Lumen 09/08/22 0549 18 G Anterior;Right Forearm <1 day                    Significant Labs:    CBC/Anemia Profile:  Recent Labs   Lab 09/08/22  1408 09/08/22  1409 09/08/22  1519 09/08/22  1627   WBC 22.72*  --   --   --    HGB 11.3*  --   --   --    HCT 32.7* 32* 31* 38   *  --   --   --    MCV 99*  --   --   --    RDW 13.9  --   --   --         Chemistries:  Recent Labs   Lab 09/08/22  1408 09/08/22  1625    141   K 5.0 5.6*   * 114*   CO2 21* 19*   BUN 14 14   CREATININE 1.1 1.2   CALCIUM 8.8 8.5*   ALBUMIN 2.9*  --    PROT 4.9*  --    BILITOT 1.4*  --    ALKPHOS 60  --    ALT 25  --    AST 67*  --    MG 2.4  2.4 2.4   PHOS 3.0  3.0 2.3*       All pertinent labs within the past 24 hours have been reviewed.    Significant Imaging: I have reviewed all pertinent imaging results/findings within the past 24 hours.

## 2022-09-08 NOTE — ASSESSMENT & PLAN NOTE
knee/Right:   Neuro/Psych:   -- Sedation: Precedex. Wean as tolerated with plans to extubate    -- Pain: PRN Oxy  -- Scheduled Tylenol             Cards:   -- S/P MVR (bio), TVr on 9/8/22  -- HDS on 0.5 milrinone  -- Atrial and Ventricular pacing wires. AV Paced at 70 BPM  -- MAP  60-80, Syst < 140  -- Cleviprex PRN  -- Aspirin 325mg tonight pending postoperative coags and chest tube output      Pulm:   -- Goal O2 sat > 90%  -- ABG PRN  -- Wean vent as tolerated  -- Plan to extubate tonight if stable  -- Mediastinal chest tubes x2      Renal:  -- Keep uribe for strict I/O  -- Trend BUN/Cr       FEN / GI:   -- Replace lytes as needed  -- Nutrition: NPO  -- GI ppx: famotidine  -- Bowel reg: miralax  -- Albumin PRN      ID:   -- Tm: afebrile; WBC stable  -- Mary-op ancef      Heme/Onc:   -- H/H stable   -- Daily CBC  -- 740 mL Cell saver given back  -- Post-op coags pending  -- Aspirin 325mg QD      Endo:   -- BG goal 140-180  -- Insulin gtt  -- endocrinology consult      PPx:   Feeding: NPO  Analgesia/Sedation: Precedex / PRN Oxy; scheduled tylenol  Thromboembolic prevention: SCDs  HOB >30: Yes  Stress Ulcer ppx: famotidine  Glucose control: Critical care goal 140-180 g/dl, ISS     Lines/Drains/Airway: CVC RIZULEMA, Uribe, Chest tubes x 3, ventricular pacing wires, L radial A-line      Dispo/Code Status/Palliative:   -- SICU / Full Code.

## 2022-09-08 NOTE — RESPIRATORY THERAPY
Received patient from OR intubated with 7.5 ETT secured at 23cm at the lips. Placed on ventilator with documented settings. ABG done. Ambu and mask at bedside. Will continue to monitor.

## 2022-09-08 NOTE — H&P
[facility] - [facility]  Critical Care - Surgery  History & Physical    Patient Name: Torsten Gordillo  MRN: [id number]  Admission Date: 9/8/2022  Code Status: Full Code  Attending Physician: Jadiel Andrew MD   Primary Care Provider: Marcin Farley MD   Principal Problem: S/P mitral valve replacement    Subjective:     HPI:  Torsten Gordillo is a 62 y.o. male who presents for pre-op Mvr/R. Medical conditions include COPD, DM2, JULIAN, Fatty Liver, HTN, HLD, dilated cardiomyopathy, prostate cancer following with urology (low risk group per report). Patient reports that he has been having increasing SOB. Can only walk 1/2 block prior to having difficulty. Energy level is at a zero. Occasional dizziness and lower extremity swelling. Some orthopnea but not every night. Also with occasional palpitations at night. No chest pain. No prior strokes, seizures, stents, or sternotomies. Had a DVT last year when admitted for heart failure that resolved with anticoagulation. Quit smoking 3 months ago. Prior to that smoked 1/2ppd for around 40 years. No significant drinking history. Diabetes is controlled by diet and patient does not check them regularly.      He presents to the SICU now s/p MVR and TVr. On admission he is intubated, sedated, and in stable condition. Pressor requirements upon admission are 0.02 of Epi, 0.5 milrinone. Blood pressures are stable with MAPs maintaining greater than 65. He has a RIJ CVC, PIV, and Chest tubes x2 with appropriate output.    Intraoperatively he received 560cc of Cell Saver and 180cc of Cell saver post op. His pre-operative echocardiogram showed There is moderate LV dysfunction. LVEF 35% by simpsons method. The RV function is mildly decreased. The LV and LA are severely dilated. Inferior and inferoseptal walls are mildly hypokinetic. There is severe MR with holosystolic flow reversal in Pulmonary veins. The anterior mitral leaflet is thickened at the leaflet tip and its  excursion is severely restricted. The posterior mitral valve leaflet appears severely restricted as well. No mitral stenosis appreciated. There is no AI or AS appreciated. The aortic valve is trileaflet. There is enlargement of the tricuspid annulus (5.3-5.8 cm depending on the view) associated with moderate to severe TR in the setting of a PA catheter. Post-operative echo LV function mildly decreased with LVEF ~20-25%. Inferior and inferoseptal walls are moderately to severely hypokinetic. Remaining LV walls are mildly hypokinetic.   Right sided systolic function mildly to moderately decreased. .    Immediate post-operative plans include hemodynamic stabilization and weaning of cardiac and respiratory support. Plans to wean vasopressors as tolerated, wean vent support with goals of extubation, and closely monitor fluid status with strict I's+O's and continued fluid resuscitation as needed.        Hospital/ICU Course:  No notes on file    Follow-up For: Procedure(s) (LRB):  REPLACEMENT, MITRAL VALVE (N/A)  REPAIR, TRICUSPID VALVE, WITH RING INSERTION (N/A)    Post-Operative Day: Day of Surgery     Past Medical History:   Diagnosis Date    Bronchitis     Cardiomyopathy 5/2/2016    40-45% by SIMONA    COPD (chronic obstructive pulmonary disease)     Diabetes mellitus     DM (diabetes mellitus), type 2 5/11/2016    Fatty liver     Heart failure with reduced ejection fraction, NYHA class II 7/9/2021    EF 40% in 04/2021 NYHA III; sleeps in recliner, PND, rare LE edema    History of DVT (deep vein thrombosis) 2/23/2021    Hyperlipidemia 5/2/2016    Hypertension     Nonrheumatic mitral valve regurgitation 5/31/2021    Severe    Pulmonary arterial hypertension 5/31/2021    Moderate    Suspected sleep apnea 5/2/2016       Past Surgical History:   Procedure Laterality Date    BIOPSY WITH TRANSRECTAL ULTRASOUND (TRUS) GUIDANCE N/A 2/2/2022    Procedure: BIOPSY, WITH TRANSRECTAL US GUIDANCE;  Surgeon: Thomas HURT  Audrey NEWELL MD;  Location: [facility];  Service: Urology;  Laterality: N/A;  prostate     COLONOSCOPY N/A 2016    Procedure: COLONOSCOPY;  Surgeon: Faith Harris MD;  Location: [facility];  Service: Endoscopy;  Laterality: N/A;    COLONOSCOPY N/A 2021    Procedure: COLONOSCOPY;  Surgeon: Luis Bogran-Reyes, MD;  Location: [facility] ENDO;  Service: Endoscopy;  Laterality: N/A;    CYSTOSCOPY N/A 2022    Procedure: CYSTOSCOPY;  Surgeon: Thomas Ventura II, MD;  Location: [facility] OR;  Service: Urology;  Laterality: N/A;    LEFT HEART CATHETERIZATION Left 2022    Procedure: CATHETERIZATION, HEART, LEFT;  Surgeon: Quinn Duke MD;  Location: [facility] CATH LAB;  Service: Cardiology;  Laterality: Left;       Review of patient's allergies indicates:  No Known Allergies    Family History       Problem Relation (Age of Onset)    Diabetes Mother    Hypertension Mother    Prostate cancer Father          Tobacco Use    Smoking status: Former     Packs/day: 1.00     Years: 39.00     Pack years: 39.00     Types: Cigarettes     Start date: 1977     Quit date: 2021     Years since quittin.6    Smokeless tobacco: Never   Substance and Sexual Activity    Alcohol use: No     Alcohol/week: 0.0 standard drinks    Drug use: No    Sexual activity: Not Currently      Review of Systems   Unable to perform ROS: Intubated   Objective:     Vital Signs (Most Recent):  Temp: (!) 95.6 °F (35.3 °C) (22 1515)  Pulse: 73 (22 1700)  Resp: (!) 22 (22 1700)  BP: 97/66 (22 1700)  SpO2: 100 % (22 1700)   Vital Signs (24h Range):  Temp:  [95.6 °F (35.3 °C)-97.8 °F (36.6 °C)] 95.6 °F (35.3 °C)  Pulse:  [70-98] 73  Resp:  [0-26] 22  SpO2:  [67 %-100 %] 100 %  BP: ()/() 97/66  Arterial Line BP: ()/(59-77)      Weight: 85.7 kg (189 lb 0.7 oz)  Body mass index is 26.37 kg/m².      Intake/Output Summary (Last 24 hours) at 2022 1723  Last data filed at 2022 1700  Gross per 24  hour   Intake 2916.05 ml   Output 1875 ml   Net 1041.05 ml       Physical Exam  Vitals and nursing note reviewed.   Constitutional:       Appearance: Normal appearance.      Comments: Intubated and sedated   Cardiovascular:      Comments: Midline incision cdi  AV paced at 70 bpm  Pulmonary:      Comments: intubated  Abdominal:      General: Abdomen is flat.      Palpations: Abdomen is soft.   Skin:     General: Skin is warm and dry.       Vents:  Vent Mode: Spont (09/08/22 1627)  Ventilator Initiated: Yes (09/08/22 1356)  Set Rate: 16 BPM (09/08/22 1620)  Vt Set: 520 mL (09/08/22 1620)  Pressure Support: 8 cmH20 (09/08/22 1627)  PEEP/CPAP: 5 cmH20 (09/08/22 1627)  Oxygen Concentration (%): 50 (09/08/22 1700)  Peak Airway Pressure: 14 cmH2O (09/08/22 1627)  Plateau Pressure: 0 cmH20 (09/08/22 1627)  Total Ve: 13.1 mL (09/08/22 1627)  Negative Inspiratory Force (cm H2O): 0 (09/08/22 1627)  F/VT Ratio<105 (RSBI): (!) 45.94 (09/08/22 1627)    Lines/Drains/Airways       Central Venous Catheter Line  Duration             Pulmonary Artery Catheter Assessment  09/08/22 0717 <1 day              Drain  Duration                  NG/OG Tube 09/08/22 1421 orogastric Center mouth <1 day         Urethral Catheter 09/08/22 0723 Non-latex;Straight-tip;Temperature probe 14 Fr. <1 day         Y Chest Tube 1 and 2 09/08/22 1209 1 Anterior Mediastinal 19 Fr. 2 Posterior Mediastinal 19 Fr. <1 day              Airway  Duration                  Airway - Non-Surgical 09/08/22 0716 <1 day              Arterial Line  Duration             Arterial Line 09/08/22 0707 Left Radial <1 day              Line  Duration                  Pacer Wires 09/08/22 1206 <1 day              Peripheral Intravenous Line  Duration                  Peripheral IV - Single Lumen 09/08/22 0549 18 G Anterior;Right Forearm <1 day                    Significant Labs:    CBC/Anemia Profile:  Recent Labs   Lab 09/08/22  1408 09/08/22  1409 09/08/22  1519 09/08/22  1627    WBC 22.72*  --   --   --    HGB 11.3*  --   --   --    HCT 32.7* 32* 31* 38   *  --   --   --    MCV 99*  --   --   --    RDW 13.9  --   --   --         Chemistries:  Recent Labs   Lab 09/08/22  1408 09/08/22  1625    141   K 5.0 5.6*   * 114*   CO2 21* 19*   BUN 14 14   CREATININE 1.1 1.2   CALCIUM 8.8 8.5*   ALBUMIN 2.9*  --    PROT 4.9*  --    BILITOT 1.4*  --    ALKPHOS 60  --    ALT 25  --    AST 67*  --    MG 2.4  2.4 2.4   PHOS 3.0  3.0 2.3*       All pertinent labs within the past 24 hours have been reviewed.    Significant Imaging: I have reviewed all pertinent imaging results/findings within the past 24 hours.    Assessment/Plan:     * S/P mitral valve replacement    Neuro/Psych:   -- Sedation: Precedex. Wean as tolerated with plans to extubate    -- Pain: PRN Oxy  -- Scheduled Tylenol             Cards:   -- S/P MVR (bio), TVr on 9/8/22  -- HDS on 0.5 milrinone  -- Atrial and Ventricular pacing wires. AV Paced at 70 BPM  -- MAP  60-80, Syst < 140  -- Cleviprex PRN  -- Aspirin 325mg tonight pending postoperative coags and chest tube output      Pulm:   -- Goal O2 sat > 90%  -- ABG PRN  -- Wean vent as tolerated  -- Plan to extubate tonight if stable  -- Mediastinal chest tubes x2      Renal:  -- Keep uribe for strict I/O  -- Trend BUN/Cr       FEN / GI:   -- Replace lytes as needed  -- Nutrition: NPO  -- GI ppx: famotidine  -- Bowel reg: miralax  -- Albumin PRN      ID:   -- Tm: afebrile; WBC stable  -- Mary-op ancef      Heme/Onc:   -- H/H stable   -- Daily CBC  -- 740 mL Cell saver given back  -- Post-op coags pending  -- Aspirin 325mg QD      Endo:   -- BG goal 140-180  -- Insulin gtt  -- endocrinology consult      PPx:   Feeding: NPO  Analgesia/Sedation: Precedex / PRN Oxy; scheduled tylenol  Thromboembolic prevention: SCDs  HOB >30: Yes  Stress Ulcer ppx: famotidine  Glucose control: Critical care goal 140-180 g/dl, ISS     Lines/Drains/Airway: CVC RIJ, Uribe, Chest  tubes x 3, ventricular pacing wires, L radial A-line      Dispo/Code Status/Palliative:   -- SICU / Full Code.          Jeremy Delatorre MD  Critical Care - Surgery  [facility] - [facility]

## 2022-09-08 NOTE — PROGRESS NOTES
Autotransfusion/Rapid Infusion Record:      09/08/2022  Autotransfusionist:  Nico Oneill    Surgeon(s) and Role:     * Jadiel Andrew MD - Primary     * Doreen Judd MD - Fellow  Anesthesiologist:  Yaquelin Ann MD    Past Medical History:   Diagnosis Date    Bronchitis     Cardiomyopathy 5/2/2016    40-45% by SIMONA    COPD (chronic obstructive pulmonary disease)     Diabetes mellitus     DM (diabetes mellitus), type 2 5/11/2016    Fatty liver     Heart failure with reduced ejection fraction, NYHA class II 7/9/2021    EF 40% in 04/2021 NYHA III; sleeps in recliner, PND, rare LE edema    History of DVT (deep vein thrombosis) 2/23/2021    Hyperlipidemia 5/2/2016    Hypertension     Nonrheumatic mitral valve regurgitation 5/31/2021    Severe    Pulmonary arterial hypertension 5/31/2021    Moderate    Suspected sleep apnea 5/2/2016       Procedure(s) (LRB):  REPLACEMENT, MITRAL VALVE (N/A)  REPAIR, TRICUSPID VALVE, WITH RING INSERTION (N/A)     1:39 PM    Equipment:    Cell Saver     R.I.S.  : Security Scorecard Model: CATSmart or CATSplus : [place]   Model: [id number]     Serial number: [id number]   Serial number:    Disposable lot #: [id number]   Disposable lot #:      Were extra cardiotomies used for cell saver?  No   if yes, #:      Solutions:  Anticoagulant: ACD-A   Expiration date: 01/24 Volume used: 300   Wash solution: 0.9% NaCl   Expiration date: 05/25 Volume used: 4798     Cell saver checklist  Time completed: 07:04          [x]   Circuit assembled correctly     [x]   Cell saver powered and operational     [x]   Vacuum connected, functional, adjust to max -150mmHg     [x]   Anticoagulant drip rate adjusted     [x]   Transfer bag properly labeled with patient name, expiration time, volume,       anticoagulant, OR number, and initials     [x]   Cell saver disinfected after use (completed at end of case)       Cell Saver volumes:    Total volume processed:     3046 mL     Total volume  pRBCs recovered     720 mL     Volume pRBCs infused     720 mL         RIS checklist   Time completed:  []   RIS circuit assembled correctly     []   RIS power and operational     []   RIS disinfected after use (completed at end of case)       RIS volumes:    Total volume infused:    (see anesthesia record for blood       product information)    mL       Additional comments:

## 2022-09-08 NOTE — CARE UPDATE
Pt extubated to 4L NC per MD order. RT suctioned pt above and below cuff prior to extubation. Cuff deflated upon extubation. Pt tolerated well and vitally stable on 4L NC. Will continue to monitor pt

## 2022-09-08 NOTE — TRANSFER OF CARE
"Anesthesia Transfer of Care Note    Patient: Torsten Gordillo    Procedure(s) Performed: Procedure(s) (LRB):  REPLACEMENT, MITRAL VALVE (N/A)  REPAIR, TRICUSPID VALVE, WITH RING INSERTION (N/A)    Patient location: ICU    Anesthesia Type: general    Transport from OR: Upon arrival to PACU/ICU, patient attached to ventilator and auscultated to confirm bilateral breath sounds and adequate TV. Continuous ECG monitoring in transport. Continuous SpO2 monitoring in transport. Transported from OR intubated on 100% O2 by AMBU with adequate controlled ventilation. Continuos invasive BP monitoring in transport. Continuous PA pressure monitoring in transport. Continuous CVP monitoring in transport    Post pain: adequate analgesia    Post assessment: no apparent anesthetic complications and tolerated procedure well    Post vital signs: stable    Level of consciousness: sedated    Nausea/Vomiting: no nausea/vomiting    Complications: none    Transfer of care protocol was followed      Last vitals:   Visit Vitals  BP (!) 140/101   Pulse 86   Temp 36.6 °C (97.8 °F) (Oral)   Resp (!) 24   Ht 5' 11" (1.803 m)   Wt 85.7 kg (189 lb 0.7 oz)   SpO2 97%   BMI 26.37 kg/m²     "

## 2022-09-08 NOTE — CONSULTS
[facility] - Surgical Intensive Care  Endocrinology  Diabetes Consult Note    Consult Requested by: Jadiel Andrew MD   Reason for admit: S/P mitral valve replacement    HISTORY OF PRESENT ILLNESS:  Reason for Consult: Management of T2DM, Hyperglycemia     Surgical Procedure and Date:   9/8/22  REPLACEMENT, MITRAL VALVE (N/A)  REPAIR, TRICUSPID VALVE, WITH RING INSERTION (N/A)     Diabetes diagnosis year: MONTEZ; intubated    Home Diabetes Medications: Amaryl 4 mg daily (per  chart review)     How often checking glucose at home? MONTEZ  BG readings on regimen: MONTEZ  Hypoglycemia on the regimen?  MONTEZ  Missed doses on regimen?  MONTEZ    Diabetes Complications include:     none    Complicating diabetes co morbidities:   JULIAN, HTN, HLD       HPI:   Patient is a 62 y.o. male with a diagnosis of COPD, DM2, JULIAN, Fatty Liver, HTN, HLD, dilated cardiomyopathy, and prostate cancer following with urology. He presents for pre-op Mv/R. He is s/p the above procedures. Endocrinology consulted for management of T2DM.     Lab Results   Component Value Date    HGBA1C 5.7 (H) 09/06/2022           Interval HPI:   Overnight events: Admitted to SICU. Remains intubated. BG within goal ranges on IV intensive insulin protocol. Diet NPO Except for: Sips with Medication    Eating:   NPO  Nausea: No  Hypoglycemia and intervention: No  Fever: No  TPN and/or TF: No  If yes, type of TF/TPN and rate: n/a    PMH, PSH, FH, SH reviewed     ROS:  Unable to obtain due to: Sedation,Intubation,Altered mental status,Critical illness,Reviewed ROS from note dated 9/8/22 by  Doreen Judd MD     Review of Systems    Current Medications and/or Treatments Impacting Glycemic Control  Immunotherapy:    Immunosuppressants       None          Steroids:   Hormones (From admission, onward)      None          Pressors:    Autonomic Drugs (From admission, onward)      Start     Stop Route Frequency Ordered    09/08/22 1330  EPINEPHrine 5 mg in dextrose 5% 250 mL  infusion (premix)        Question Answer Comment   Begin at (in mcg/kg/min): 0.02    Titrate by: (in mcg/kg/min) 0.02    Titrate interval: (in minutes) 5    Titrate to maintain: (SBP or MAP or Cardiac Index) MAP    Greater than: (in mmHg) 65    Cardiac index greater than: (in L/min) 2.2    Maximum dose: (in mcg/kg/min) 2        -- IV Continuous 09/08/22 1329          Hyperglycemia/Diabetes Medications:   Antihyperglycemics (From admission, onward)      Start     Stop Route Frequency Ordered    09/08/22 1330  insulin regular in 0.9 % NaCl 100 unit/100 mL (1 unit/mL) infusion        Question Answer Comment   Insulin rate changes (DO NOT MODIFY ANSWER) [email or web address]\InsulinInfusions\CTS INSULIN [id number].pdf    Enter initial dose (Units/hr): 1        -- IV Continuous 09/08/22 1329             PHYSICAL EXAMINATION:  Vitals:    09/08/22 1414   BP:    Pulse: 80   Resp: (!) 0   Temp:      Body mass index is 26.37 kg/m².    Physical Exam  Constitutional: Well developed, NAD.  ENT: External ears no masses with nose patent  Neck: Supple; trachea midline  Cardiovascular: Normal heart sounds, no LE edema. DP +2 bilaterally.  Lungs: Normal effort; lungs anterior bilaterally clear to auscultation.  Intubated on a ventilator.  Abdomen: Soft, no masses, no hernias.  Hypoactive BS noted.  MS: No clubbing or cyanosis of nails noted; unable to assess gait.  Skin: No rashes, lesions, or ulcers; no nodules.  Mid-sternal incision with telfa island dressing, CDI.  CT x 2.  LLE wrapped with ACE wrap.  Psychiatric: MONTEZ  Neurological: MONTEZ  Foot: Nails in good condition, no amputations noted        Labs Reviewed and Include   Recent Labs   Lab 09/08/22  1408   *   CALCIUM 8.8   ALBUMIN 2.9*   PROT 4.9*      K 5.0   CO2 21*   *   BUN 14   CREATININE 1.1   ALKPHOS 60   ALT 25   AST 67*   BILITOT 1.4*     Lab Results   Component Value Date    WBC 22.72 (H) 09/08/2022    HGB 11.3 (L) 09/08/2022    HCT 32 (L)  09/08/2022    MCV 99 (H) 09/08/2022     (L) 09/08/2022     No results for input(s): TSH, FREET4 in the last 168 hours.  Lab Results   Component Value Date    HGBA1C 5.7 (H) 09/06/2022       Nutritional status:   Body mass index is 26.37 kg/m².  Lab Results   Component Value Date    ALBUMIN 2.9 (L) 09/08/2022    ALBUMIN 3.8 09/06/2022    ALBUMIN 3.5 06/21/2022     No results found for: PREALBUMIN    Estimated Creatinine Clearance: 75.1 mL/min (based on SCr of 1.1 mg/dL).    Accu-Checks  Recent Labs     09/08/22  0548 09/08/22  1407   POCTGLUCOSE 90 143*        ASSESSMENT and PLAN    * S/P mitral valve replacement  Managed per primary team  Optimize BG control        Controlled type 2 diabetes mellitus, without long-term current use of insulin  BG goal 110-140 (CTS protocol)     Continue IV insulin infusion protocol  Requires intensive BG monitoring while on protocol     ** Please call Endocrine for any BG related issues **    Discharge planning: TBD        S/P tricuspid valve repair  Managed per primary team  Optimize BG control      Hyperlipidemia  May increase insulin resistance.             Plan discussed with patient, family, and RN at bedside.     Irais Cartagena NP  Endocrinology  [facility] - Surgical Intensive Care

## 2022-09-08 NOTE — ANESTHESIA PROCEDURE NOTES
SIMONA    Diagnosis: MR  Patient location during procedure: OR  Exam type: Baseline    Staffing  Performed: fellow and anesthesiologist     Anesthesiologist: Yaquelin Ann MD        Anesthesiologist Present  Yes      Setup & Induction  Patient preparation: bite block inserted  Probe Insertion: easy  Exam: completeDoppler Echo: 2D, 3D, color flow mapping, pulse wave Doppler and continuous wave Doppler.  Exam     Left Heart  Left Atruim: dilated and severly enlarged (men >5.2; women >4.7)  Left appendage velocity:37    Left Ventricle: cm, severely abnormal (men >/= 6.8; women>/= 6.1)  LV Wall Thickness (posterior wall):cm, normal (men 0.6-1.0 cm; women 0.6-0.9 cm)    LVAD:no  FINDINGS - ESTIMATED EJECTION FRACTION: 30-35%  Regional Wall Abnormalities: RWMA present            Right Heart  Right Ventricle: dilated  Right Ventricle Function: mildly decreased  Right Atria:  severly enlarged    Intra Atrial Septum  PFO: no shunt by color flow doppler  no IAS aneurysm  no lipomatous hypertrophy  no Atrial Septal Defect (Asd)    Right Ventricle  Size: dilated, Free Wall Thickness: normal    Aortic Valve:  Stenosis: none.  Morphology: trileaflet    Regurgitation: no aortic valve regurgitation      Mitral Valve:   Jet Description: severeStenosis: no significant stenosis.    Tricuspid Valve:  Morphology: normal  TRICUSPID REGURGITATION: severe in the presence of a PA catheter.    Pulmonic Valve:  Morphology:normal  Regurgitation(color flow): none    Great Vessels  Ascending Aorta Atherosclerosis: 2=mild dz (<3mm)  Aortic Arch Atherosclerosis: 2=mild dz (<3mm)  IABP: no  Descending Aorta Atherosclerosis: 2=mild dz (<3mm)  Aorta    Descending aorta IABP: no    Effusions left pleural    SummaryFindings discussed with surgeon.    Other Findings   Diagnostic intraoperative SIMONA performed at the request of Dr. Andrew for MVR, TVr with CPB.    Pre:  There is moderate LV dysfunction. LVEF 35% by simpsons method.   The RV function is  The ECG revealed a sinus rhythm.  mildly decreased.   The LV and LA are severely dilated.   Inferior and inferoseptal walls are mildly hypokinetic.   There is severe MR with holosystolic flow reversal in Pulmonary veins. The anterior mitral leaflet is thickened at the leaflet tip and its excursion is severely restricted. The posterior mitral valve leaflet appears severely restricted as well. No mitral stenosis appreciated.   There is no AI or AS appreciated. The aortic valve is trileaflet.   There is enlargement of the tricuspid annulus (5.3-5.8 cm depending on the view) associated with moderate to severe TR in the setting of a PA catheter.   The no significant pulmonic valvular disease.   There is mild atherosclerotic disease throughout the Aorta, otherwise the Aorta is unremarkable.   There is a small left sided pleural effusion, otherwise no other significant pleural or pericardial effusions.   No PFO, VSD, ASD appreciated.   Severe pHTN with Estimated PASP 60    Post:  S/p 35 mm bioprosthetic MVR and TVr with ring  On inotropic and pressor support with low dose epi/vaso/milrinone gtts.   LV function mildly decreased with LVEF ~20-25%. Inferior and inferoseptal walls are moderately to severely hypokinetic. Remaining LV walls are mildly hypokinetic.   Right sided systolic function mildly to moderately decreased.   Bioprosthetic mitral valve is well seated and in adequate position with adequate leaflet excursion. There is trivial transvalvular regurgitation.The mean pressure gradient across the valve is 3 mmHg.   The Aortic valve is unchanged in structure and function.   The Tricuspid Valve has been ringed without evidence of significant stenosis or regurgitation.   The pulmonic valve is difficult to visualize.   No PFO via color flow doppler  No new aortic abnormalities  There are no significant pleural or pericardial effusions.     Stable s/p chest closure  Probe removed atraumatically

## 2022-09-08 NOTE — ANESTHESIA POSTPROCEDURE EVALUATION
Anesthesia Post Evaluation    Patient: Torsten Gordillo    Procedure(s) Performed: Procedure(s) (LRB):  REPLACEMENT, MITRAL VALVE (N/A)  REPAIR, TRICUSPID VALVE, WITH RING INSERTION (N/A)    Final Anesthesia Type: general      Patient location during evaluation: ICU  Patient participation: No - Unable to Participate, Sedation  Level of consciousness: sedated  Post-procedure vital signs: reviewed and stable  Pain management: adequate  Airway patency: patent    PONV status at discharge: No PONV  Anesthetic complications: no      Cardiovascular status: hemodynamically stable  Respiratory status: intubated            Vitals Value Taken Time   /68 09/08/22 1435   Temp 98.0 09/08/22 1442   Pulse 80 09/08/22 1442   Resp 16 09/08/22 1442   SpO2 100 % 09/08/22 1441   Vitals shown include unvalidated device data.      No case tracking events are documented in the log.      Pain/Gilberto Score: Pain Rating Prior to Med Admin: 0 (9/8/2022  5:52 AM)

## 2022-09-08 NOTE — ASSESSMENT & PLAN NOTE
BG goal 110-140 (CTS protocol)     Continue IV insulin infusion protocol  Requires intensive BG monitoring while on protocol     ** Please call Endocrine for any BG related issues **    Discharge planning: JANEE

## 2022-09-08 NOTE — INTERVAL H&P NOTE
The patient has been examined and the H&P has been reviewed:    I concur with the findings and no changes have occurred since H&P was written.    Surgery risks, benefits and alternative options discussed and understood by patient/family.          There are no hospital problems to display for this patient.    Doreen Judd MD, PGY-7  Cardiothoracic Surgery   [phone]

## 2022-09-08 NOTE — ANESTHESIA PROCEDURE NOTES
Intubation    Date/Time: 9/8/2022 7:16 AM  Performed by: Hilary Rodas MD  Authorized by: Yaquelin Ann MD     Intubation:     Induction:  Intravenous    Intubated:  Postinduction    Mask Ventilation:  Easy mask    Attempts:  1    Attempted By:  Student    Method of Intubation:  Direct    Blade:  Serrato 2    Laryngeal View Grade: Grade I - full view of cords      Difficult Airway Encountered?: No      Complications:  None    Airway Device:  Oral endotracheal tube    Airway Device Size:  7.5    Style/Cuff Inflation:  Cuffed (inflated to minimal occlusive pressure)    Tube secured:  22    Secured at:  The teeth    Placement Verified By:  Capnometry    Complicating Factors:  None    Findings Post-Intubation:  BS equal bilateral and atraumatic/condition of teeth unchanged

## 2022-09-08 NOTE — HPI
Torsten Gordillo is a 62 y.o. male who presents for pre-op Mvr/R. Medical conditions include COPD, DM2, JULIAN, Fatty Liver, HTN, HLD, dilated cardiomyopathy, prostate cancer following with urology (low risk group per report). Patient reports that he has been having increasing SOB. Can only walk 1/2 block prior to having difficulty. Energy level is at a zero. Occasional dizziness and lower extremity swelling. Some orthopnea but not every night. Also with occasional palpitations at night. No chest pain. No prior strokes, seizures, stents, or sternotomies. Had a DVT last year when admitted for heart failure that resolved with anticoagulation. Quit smoking 3 months ago. Prior to that smoked 1/2ppd for around 40 years. No significant drinking history. Diabetes is controlled by diet and patient does not check them regularly.      He presents to the SICU now s/p MVR and TVr. On admission he is intubated, sedated, and in stable condition. Pressor requirements upon admission are 0.02 of Epi, 0.5 milrinone. Blood pressures are stable with MAPs maintaining greater than 65. He has a RIJ CVC, PIV, and Chest tubes x2 with appropriate output.    Intraoperatively he received 560cc of Cell Saver and 180cc of Cell saver post op. His pre-operative echocardiogram showed There is moderate LV dysfunction. LVEF 35% by simpsons method. The RV function is mildly decreased. The LV and LA are severely dilated. Inferior and inferoseptal walls are mildly hypokinetic. There is severe MR with holosystolic flow reversal in Pulmonary veins. The anterior mitral leaflet is thickened at the leaflet tip and its excursion is severely restricted. The posterior mitral valve leaflet appears severely restricted as well. No mitral stenosis appreciated. There is no AI or AS appreciated. The aortic valve is trileaflet. There is enlargement of the tricuspid annulus (5.3-5.8 cm depending on the view) associated with moderate to severe TR in the setting of a PA  catheter. Post-operative echo LV function mildly decreased with LVEF ~20-25%. Inferior and inferoseptal walls are moderately to severely hypokinetic. Remaining LV walls are mildly hypokinetic.   Right sided systolic function mildly to moderately decreased. .    Immediate post-operative plans include hemodynamic stabilization and weaning of cardiac and respiratory support. Plans to wean vasopressors as tolerated, wean vent support with goals of extubation, and closely monitor fluid status with strict I's+O's and continued fluid resuscitation as needed.

## 2022-09-08 NOTE — SUBJECTIVE & OBJECTIVE
Interval HPI:   Overnight events: Admitted to SICU. Remains intubated. BG within goal ranges on IV intensive insulin protocol. Diet NPO Except for: Sips with Medication    Eating:   NPO  Nausea: No  Hypoglycemia and intervention: No  Fever: No  TPN and/or TF: No  If yes, type of TF/TPN and rate: n/a    PMH, PSH, FH, SH reviewed     ROS:  Unable to obtain due to: Sedation,Intubation,Altered mental status,Critical illness,Reviewed ROS from note dated 9/8/22 by  Doreen Judd MD     Review of Systems    Current Medications and/or Treatments Impacting Glycemic Control  Immunotherapy:    Immunosuppressants       None          Steroids:   Hormones (From admission, onward)      None          Pressors:    Autonomic Drugs (From admission, onward)      Start     Stop Route Frequency Ordered    09/08/22 1330  EPINEPHrine 5 mg in dextrose 5% 250 mL infusion (premix)        Question Answer Comment   Begin at (in mcg/kg/min): 0.02    Titrate by: (in mcg/kg/min) 0.02    Titrate interval: (in minutes) 5    Titrate to maintain: (SBP or MAP or Cardiac Index) MAP    Greater than: (in mmHg) 65    Cardiac index greater than: (in L/min) 2.2    Maximum dose: (in mcg/kg/min) 2        -- IV Continuous 09/08/22 1329          Hyperglycemia/Diabetes Medications:   Antihyperglycemics (From admission, onward)      Start     Stop Route Frequency Ordered    09/08/22 1330  insulin regular in 0.9 % NaCl 100 unit/100 mL (1 unit/mL) infusion        Question Answer Comment   Insulin rate changes (DO NOT MODIFY ANSWER) \\[email or web address]\Images\Pharmacy\InsulinInfusions\CTS INSULIN [id number].pdf    Enter initial dose (Units/hr): 1        -- IV Continuous 09/08/22 1329             PHYSICAL EXAMINATION:  Vitals:    09/08/22 1414   BP:    Pulse: 80   Resp: (!) 0   Temp:      Body mass index is 26.37 kg/m².    Physical Exam  Constitutional: Well developed, NAD.  ENT: External ears no masses with nose patent  Neck: Supple; trachea  midline  Cardiovascular: Normal heart sounds, no LE edema. DP +2 bilaterally.  Lungs: Normal effort; lungs anterior bilaterally clear to auscultation.  Intubated on a ventilator.  Abdomen: Soft, no masses, no hernias.  Hypoactive BS noted.  MS: No clubbing or cyanosis of nails noted; unable to assess gait.  Skin: No rashes, lesions, or ulcers; no nodules.  Mid-sternal incision with telfa island dressing, CDI.  CT x 2.  LLE wrapped with ACE wrap.  Psychiatric: MONTEZ  Neurological: MONTEZ  Foot: Nails in good condition, no amputations noted

## 2022-09-08 NOTE — H&P
"Report received from Anesthesia. Pt transported to SICU 23823 with portable telemetryAmbubag" in use. Pt connected to ICU monitor Ventilator. Jeremy Delatorre MD called and made aware of patient arrival. New orders received and implemented. Pt assessed, immediate needs met. Family brought to bedside, updated on the patient's current condition and PoC for remainder of shift. Family also given ICU Welcome packet and educated on visiting hours. All questions answered, emotional support provided.     Admit Skin Note: No skin breakdown noted      Pt extubated to NC  at 1720. WCTM.   "

## 2022-09-08 NOTE — PROGRESS NOTES
Patient lying on bed in preop and feeling a little SOB. Began c/o of new onset of chest pain w/o radiation 7/10. Obtained /98, hr 86 sat 95% Lead 2 applied NSR noted. Paged anes. Spoke with Dr Farrar. He will come assess patient. O2 2L NC applied.

## 2022-09-08 NOTE — OP NOTE
DATE OF PROCEDURE:  09/08/2022     ATTENDING SURGEON:  Jadiel Andrew M.D.    ASSISTANT:  Doreen Judd M.D., (Cardiothoracic Resident)     PREOPERATIVE DIAGNOSES:  1.  Severe mitral regurgitation.  2.  COPD  3.  Diabetes mellitus  4.  Hypertension  5.  Dilated cardiomyopathy  6.  Chronic combined systolic and diastolic heart failure     POSTOPERATIVE DIAGNOSES:  1.  Severe mitral regurgitation.  2.  COPD  3.  Diabetes mellitus  4.  Hypertension  5.  Dilated cardiomyopathy  6.  Chronic combined systolic and diastolic heart failure  7.  Severe tricuspid regurgitation    OPERATION PERFORMED:      1)  Mitral valve replacement with a 33 mm Medtronic mosaic porcine bioprosthesis    2)  Tricuspid valve repair with a 28 mm Medtronic Contour rigid partial band     ANESTHESIA:  General endotracheal.     ESTIMATED BLOOD LOSS:  100 mL.     BRIEF HISTORY:  Mr. Gordillo is a very pleasant 62-year-old gentleman who initially presented with progressive dyspnea on exertion.  He also reports decreased stamina.  He underwent a thoughtful and thorough evaluation which included echocardiography.  This study demonstrated severe mitral regurgitation and an ejection fraction of 38%.  He now presents for surgical correction.      PROCEDURE IN DETAIL:  After obtaining informed and written consent, the patient   was brought to the Operating Room and placed on the operating table in supine   position.  After induction of adequate general endotracheal anesthesia, the   patient's chest, abdomen, pelvis and bilateral lower extremities were prepped   and draped in the usual sterile fashion.  An upper midline skin incision was   made and a median sternotomy was performed.  A pericardial well was created.    The patient was systemically heparinized.  Cannulation sutures were placed in   the aorta and in the superior vena cava.  The aortic cannula was inserted,   followed by the venous.  An IVC cannula was also placed.  Antegrade and    retrograde cardioplegia catheters were placed.  The patient was then put on   cardiopulmonary bypass.  Once on bypass, the aortic crossclamp was applied and   the heart was arrested using cold blood enhanced antegrade cardioplegia.  A   prompt electromechanical arrest was achieved.  While the cardioplegia was being   administered, circumferential control was obtained over the superior vena cava and the inferior vena cava.    Once the cardioplegia was all in, the cannulas were repositioned and a left   atriotomy was made near the right-sided pulmonary veins.  The Bermeo retractor   was used for exposure.  The mitral valve was evaluated.  The anterior leaflet was somewhat thickened.  There was severe tethering of the posterior leaflet.  Given his underlying ventricular function and lack of leaflet abnormalities as well as the presence of functional mitral regurgitation I felt that mitral valve replacement was indicated.  The anterior leaflet was partially resected and used for exposure as   multiple pledgeted 2-0 Ethibond sutures were placed along the anterior annulus.    The anterior leaflet was eventually resected in its entirety.      We were able   to preserve the posterior leaflet as we completed circumferential   coverage of the mitral orifice using pledgeted 2-0 Ethibond sutures.  Once the   sutures were all in, the annulus was sized and a 33 mm valve was selected.  The   valve was brought up, the sutures were passed through the sewing ring and it was   slid into position.  It seated nicely.  The sutures were tied and then cut.    The left atriotomy was then closed in a single layer using running 4-0 Prolene   suture.  Prior to closing the left atrium, de-airing maneuvers were performed.      Once the left atrium was closed, the cannulas were repositioned.  The hotshot was given retrograde. Once the hotshot was all in, the aortic crossclamp was removed. A right atriotomy was made.  Silk sutures were placed for  exposure.  The tricuspid valve was evaluated.  The annulus was severely dilated.  The leaflets were structurally normal.  Multiple non pledgetted 2-0 Ethibond sutures were placed along the tricuspid annulus from the commissure between the septal and anterior leaflets to midway along the septal leaflet.  The annulus was sized, and a 28 mm rigid band was selected.  The band was brought up, the sutures were passed through it, and it was slid into position.  It seated nicely.  The sutures were tied and then cut.  We attempted to test the valve by occluding the pulmonary artery.  The coaptation appeared acceptable.  The right atriotomy was then closed in a single layer using running 4-0 Prolene suture. Once the right atrium was closed the hot shot was given retrograde.  Additional   de-airing maneuvers were performed and the aortic cross-clamp was removed.  The   patient was subsequently weaned from cardiopulmonary bypass.  The patient did   separate easily from bypass.  Once off bypass, all surgical sites were   inspected.  There was good hemostasis.  The valves were evaluated using SIMONA and   appeared to be functioning properly.  The test dose of protamine was   administered and this was well tolerated.  The total dose was then given.    [place] through the total dose, all cannulas were removed.  All surgical sites   were again inspected, again there was good hemostasis.  Ventricular and atrial pacing wires   were placed.  Drains were placed.  After again confirming adequate hemostasis,   the patient's chest was closed using #6 stainless steel wires to reapproximate   the sternum.  The overlying soft tissues were reapproximated using absorbable   suture material.  The patient's chest was washed and dried and a dry dressing   was applied.  The patient tolerated the procedure well, there were no   complications.  At the conclusion of the case, sponge and instrument counts were   correct.

## 2022-09-08 NOTE — HPI
Reason for Consult: Management of T2DM, Hyperglycemia     Surgical Procedure and Date:   9/8/22  REPLACEMENT, MITRAL VALVE (N/A)  REPAIR, TRICUSPID VALVE, WITH RING INSERTION (N/A)     Diabetes diagnosis year: MONTEZ; intubated    Home Diabetes Medications: Amaryl 4 mg daily (per  chart review)     How often checking glucose at home? MONTEZ  BG readings on regimen: MONTEZ  Hypoglycemia on the regimen?  MONTEZ  Missed doses on regimen?  MONTEZ    Diabetes Complications include:     none    Complicating diabetes co morbidities:   JULIAN, HTN, HLD       HPI:   Patient is a 62 y.o. male with a diagnosis of COPD, DM2, JULIAN, Fatty Liver, HTN, HLD, dilated cardiomyopathy, and prostate cancer following with urology. He presents for pre-op Mv/R. He is s/p the above procedures. Endocrinology consulted for management of T2DM.     Lab Results   Component Value Date    HGBA1C 5.7 (H) 09/06/2022

## 2022-09-09 LAB
ALBUMIN SERPL BCP-MCNC: 2.9 G/DL (ref 3.5–5.2)
ALLENS TEST: ABNORMAL
ALLENS TEST: ABNORMAL
ALP SERPL-CCNC: 66 U/L (ref 55–135)
ALT SERPL W/O P-5'-P-CCNC: 20 U/L (ref 10–44)
ANION GAP SERPL CALC-SCNC: 8 MMOL/L (ref 8–16)
APTT BLDCRRT: 31.9 SEC (ref 21–32)
APTT BLDCRRT: 31.9 SEC (ref 21–32)
AST SERPL-CCNC: 69 U/L (ref 10–40)
BASOPHILS # BLD AUTO: 0.02 K/UL (ref 0–0.2)
BASOPHILS # BLD AUTO: 0.02 K/UL (ref 0–0.2)
BASOPHILS NFR BLD: 0.1 % (ref 0–1.9)
BASOPHILS NFR BLD: 0.1 % (ref 0–1.9)
BILIRUB SERPL-MCNC: 1.9 MG/DL (ref 0.1–1)
BUN SERPL-MCNC: 15 MG/DL (ref 8–23)
CALCIUM SERPL-MCNC: 8.1 MG/DL (ref 8.7–10.5)
CHLORIDE SERPL-SCNC: 109 MMOL/L (ref 95–110)
CO2 SERPL-SCNC: 20 MMOL/L (ref 23–29)
CREAT SERPL-MCNC: 1.1 MG/DL (ref 0.5–1.4)
DELSYS: ABNORMAL
DELSYS: ABNORMAL
DIFFERENTIAL METHOD: ABNORMAL
DIFFERENTIAL METHOD: ABNORMAL
EOSINOPHIL # BLD AUTO: 0 K/UL (ref 0–0.5)
EOSINOPHIL # BLD AUTO: 0 K/UL (ref 0–0.5)
EOSINOPHIL NFR BLD: 0 % (ref 0–8)
EOSINOPHIL NFR BLD: 0 % (ref 0–8)
ERYTHROCYTE [DISTWIDTH] IN BLOOD BY AUTOMATED COUNT: 13.9 % (ref 11.5–14.5)
ERYTHROCYTE [DISTWIDTH] IN BLOOD BY AUTOMATED COUNT: 13.9 % (ref 11.5–14.5)
EST. GFR  (NO RACE VARIABLE): >60 ML/MIN/1.73 M^2
GLUCOSE SERPL-MCNC: 145 MG/DL (ref 70–110)
GLUCOSE SERPL-MCNC: 146 MG/DL (ref 70–110)
GLUCOSE SERPL-MCNC: 161 MG/DL (ref 70–110)
GLUCOSE SERPL-MCNC: 165 MG/DL (ref 70–110)
HCO3 UR-SCNC: 19 MMOL/L (ref 24–28)
HCO3 UR-SCNC: 21.1 MMOL/L (ref 24–28)
HCO3 UR-SCNC: 22.8 MMOL/L (ref 24–28)
HCO3 UR-SCNC: 24.9 MMOL/L (ref 24–28)
HCT VFR BLD AUTO: 31.9 % (ref 40–54)
HCT VFR BLD AUTO: 31.9 % (ref 40–54)
HCT VFR BLD CALC: 23 %PCV (ref 36–54)
HCT VFR BLD CALC: 25 %PCV (ref 36–54)
HCT VFR BLD CALC: 34 %PCV (ref 36–54)
HGB BLD-MCNC: 11 G/DL (ref 14–18)
HGB BLD-MCNC: 11 G/DL (ref 14–18)
IMM GRANULOCYTES # BLD AUTO: 0.06 K/UL (ref 0–0.04)
IMM GRANULOCYTES # BLD AUTO: 0.06 K/UL (ref 0–0.04)
IMM GRANULOCYTES NFR BLD AUTO: 0.4 % (ref 0–0.5)
IMM GRANULOCYTES NFR BLD AUTO: 0.4 % (ref 0–0.5)
INR PPP: 1.1 (ref 0.8–1.2)
INR PPP: 1.1 (ref 0.8–1.2)
LDH SERPL L TO P-CCNC: 1.28 MMOL/L (ref 0.36–1.25)
LYMPHOCYTES # BLD AUTO: 0.7 K/UL (ref 1–4.8)
LYMPHOCYTES # BLD AUTO: 0.7 K/UL (ref 1–4.8)
LYMPHOCYTES NFR BLD: 5 % (ref 18–48)
LYMPHOCYTES NFR BLD: 5 % (ref 18–48)
MAGNESIUM SERPL-MCNC: 2 MG/DL (ref 1.6–2.6)
MCH RBC QN AUTO: 32.5 PG (ref 27–31)
MCH RBC QN AUTO: 32.5 PG (ref 27–31)
MCHC RBC AUTO-ENTMCNC: 34.5 G/DL (ref 32–36)
MCHC RBC AUTO-ENTMCNC: 34.5 G/DL (ref 32–36)
MCV RBC AUTO: 94 FL (ref 82–98)
MCV RBC AUTO: 94 FL (ref 82–98)
MONOCYTES # BLD AUTO: 1.5 K/UL (ref 0.3–1)
MONOCYTES # BLD AUTO: 1.5 K/UL (ref 0.3–1)
MONOCYTES NFR BLD: 10.9 % (ref 4–15)
MONOCYTES NFR BLD: 10.9 % (ref 4–15)
NEUTROPHILS # BLD AUTO: 11.4 K/UL (ref 1.8–7.7)
NEUTROPHILS # BLD AUTO: 11.4 K/UL (ref 1.8–7.7)
NEUTROPHILS NFR BLD: 83.6 % (ref 38–73)
NEUTROPHILS NFR BLD: 83.6 % (ref 38–73)
NRBC BLD-RTO: 0 /100 WBC
NRBC BLD-RTO: 0 /100 WBC
PCO2 BLDA: 30.5 MMHG (ref 35–45)
PCO2 BLDA: 41.6 MMHG (ref 35–45)
PCO2 BLDA: 46.9 MMHG (ref 35–45)
PCO2 BLDA: 48.7 MMHG (ref 35–45)
PH SMN: 7.27 [PH] (ref 7.35–7.45)
PH SMN: 7.28 [PH] (ref 7.35–7.45)
PH SMN: 7.33 [PH] (ref 7.35–7.45)
PH SMN: 7.45 [PH] (ref 7.35–7.45)
PHOSPHATE SERPL-MCNC: 2.3 MG/DL (ref 2.7–4.5)
PHOSPHATE SERPL-MCNC: 2.4 MG/DL (ref 2.7–4.5)
PHOSPHATE SERPL-MCNC: 2.4 MG/DL (ref 2.7–4.5)
PLATELET # BLD AUTO: 88 K/UL (ref 150–450)
PLATELET # BLD AUTO: 88 K/UL (ref 150–450)
PMV BLD AUTO: 10 FL (ref 9.2–12.9)
PMV BLD AUTO: 10 FL (ref 9.2–12.9)
PO2 BLDA: 293 MMHG (ref 80–100)
PO2 BLDA: 298 MMHG (ref 80–100)
PO2 BLDA: 407 MMHG (ref 80–100)
PO2 BLDA: 71 MMHG (ref 80–100)
POC BE: -1 MMOL/L
POC BE: -3 MMOL/L
POC BE: -4 MMOL/L
POC BE: -8 MMOL/L
POC IONIZED CALCIUM: 1.04 MMOL/L (ref 1.06–1.42)
POC IONIZED CALCIUM: 1.14 MMOL/L (ref 1.06–1.42)
POC IONIZED CALCIUM: 1.17 MMOL/L (ref 1.06–1.42)
POC SATURATED O2: 100 % (ref 95–100)
POC SATURATED O2: 95 % (ref 95–100)
POC TCO2: 20 MMOL/L (ref 23–27)
POC TCO2: 22 MMOL/L (ref 23–27)
POC TCO2: 24 MMOL/L (ref 23–27)
POC TCO2: 26 MMOL/L (ref 23–27)
POCT GLUCOSE: 156 MG/DL (ref 70–110)
POTASSIUM BLD-SCNC: 3.4 MMOL/L (ref 3.5–5.1)
POTASSIUM BLD-SCNC: 4 MMOL/L (ref 3.5–5.1)
POTASSIUM BLD-SCNC: 4.1 MMOL/L (ref 3.5–5.1)
POTASSIUM SERPL-SCNC: 3.6 MMOL/L (ref 3.5–5.1)
POTASSIUM SERPL-SCNC: 3.7 MMOL/L (ref 3.5–5.1)
POTASSIUM SERPL-SCNC: 4.5 MMOL/L (ref 3.5–5.1)
PROT SERPL-MCNC: 4.9 G/DL (ref 6–8.4)
PROTHROMBIN TIME: 11.9 SEC (ref 9–12.5)
PROTHROMBIN TIME: 11.9 SEC (ref 9–12.5)
RBC # BLD AUTO: 3.38 M/UL (ref 4.6–6.2)
RBC # BLD AUTO: 3.38 M/UL (ref 4.6–6.2)
SAMPLE: ABNORMAL
SITE: ABNORMAL
SITE: ABNORMAL
SODIUM BLD-SCNC: 143 MMOL/L (ref 136–145)
SODIUM BLD-SCNC: 144 MMOL/L (ref 136–145)
SODIUM BLD-SCNC: 147 MMOL/L (ref 136–145)
SODIUM SERPL-SCNC: 137 MMOL/L (ref 136–145)
WBC # BLD AUTO: 13.69 K/UL (ref 3.9–12.7)
WBC # BLD AUTO: 13.69 K/UL (ref 3.9–12.7)

## 2022-09-09 PROCEDURE — 94664 DEMO&/EVAL PT USE INHALER: CPT

## 2022-09-09 PROCEDURE — 27000646 HC AEROBIKA DEVICE

## 2022-09-09 PROCEDURE — 63600175 PHARM REV CODE 636 W HCPCS: Performed by: STUDENT IN AN ORGANIZED HEALTH CARE EDUCATION/TRAINING PROGRAM

## 2022-09-09 PROCEDURE — 99232 SBSQ HOSP IP/OBS MODERATE 35: CPT | Mod: ,,, | Performed by: NURSE PRACTITIONER

## 2022-09-09 PROCEDURE — 84132 ASSAY OF SERUM POTASSIUM: CPT | Mod: 91 | Performed by: PHYSICIAN ASSISTANT

## 2022-09-09 PROCEDURE — 63600175 PHARM REV CODE 636 W HCPCS: Performed by: NURSE PRACTITIONER

## 2022-09-09 PROCEDURE — 94761 N-INVAS EAR/PLS OXIMETRY MLT: CPT

## 2022-09-09 PROCEDURE — 83735 ASSAY OF MAGNESIUM: CPT | Mod: 91

## 2022-09-09 PROCEDURE — 25000003 PHARM REV CODE 250

## 2022-09-09 PROCEDURE — 99291 CRITICAL CARE FIRST HOUR: CPT | Mod: ,,, | Performed by: ANESTHESIOLOGY

## 2022-09-09 PROCEDURE — 63600175 PHARM REV CODE 636 W HCPCS

## 2022-09-09 PROCEDURE — 94640 AIRWAY INHALATION TREATMENT: CPT

## 2022-09-09 PROCEDURE — 99291 PR CRITICAL CARE, E/M 30-74 MINUTES: ICD-10-PCS | Mod: ,,, | Performed by: ANESTHESIOLOGY

## 2022-09-09 PROCEDURE — 85610 PROTHROMBIN TIME: CPT | Performed by: PHYSICIAN ASSISTANT

## 2022-09-09 PROCEDURE — 63600175 PHARM REV CODE 636 W HCPCS: Performed by: THORACIC SURGERY (CARDIOTHORACIC VASCULAR SURGERY)

## 2022-09-09 PROCEDURE — 84100 ASSAY OF PHOSPHORUS: CPT | Mod: 91 | Performed by: INTERNAL MEDICINE

## 2022-09-09 PROCEDURE — C9248 INJ, CLEVIDIPINE BUTYRATE: HCPCS | Mod: JG | Performed by: THORACIC SURGERY (CARDIOTHORACIC VASCULAR SURGERY)

## 2022-09-09 PROCEDURE — 80053 COMPREHEN METABOLIC PANEL: CPT | Performed by: STUDENT IN AN ORGANIZED HEALTH CARE EDUCATION/TRAINING PROGRAM

## 2022-09-09 PROCEDURE — 83605 ASSAY OF LACTIC ACID: CPT

## 2022-09-09 PROCEDURE — 63600175 PHARM REV CODE 636 W HCPCS: Performed by: PHYSICIAN ASSISTANT

## 2022-09-09 PROCEDURE — 84100 ASSAY OF PHOSPHORUS: CPT | Performed by: PHYSICIAN ASSISTANT

## 2022-09-09 PROCEDURE — 25000242 PHARM REV CODE 250 ALT 637 W/ HCPCS: Performed by: STUDENT IN AN ORGANIZED HEALTH CARE EDUCATION/TRAINING PROGRAM

## 2022-09-09 PROCEDURE — 25000003 PHARM REV CODE 250: Performed by: PHYSICIAN ASSISTANT

## 2022-09-09 PROCEDURE — 99232 PR SUBSEQUENT HOSPITAL CARE,LEVL II: ICD-10-PCS | Mod: ,,, | Performed by: NURSE PRACTITIONER

## 2022-09-09 PROCEDURE — 25000003 PHARM REV CODE 250: Performed by: STUDENT IN AN ORGANIZED HEALTH CARE EDUCATION/TRAINING PROGRAM

## 2022-09-09 PROCEDURE — 85025 COMPLETE CBC W/AUTO DIFF WBC: CPT | Performed by: PHYSICIAN ASSISTANT

## 2022-09-09 PROCEDURE — 27000221 HC OXYGEN, UP TO 24 HOURS

## 2022-09-09 PROCEDURE — 37799 UNLISTED PX VASCULAR SURGERY: CPT

## 2022-09-09 PROCEDURE — 83735 ASSAY OF MAGNESIUM: CPT | Performed by: PHYSICIAN ASSISTANT

## 2022-09-09 PROCEDURE — 94799 UNLISTED PULMONARY SVC/PX: CPT

## 2022-09-09 PROCEDURE — 85730 THROMBOPLASTIN TIME PARTIAL: CPT | Performed by: PHYSICIAN ASSISTANT

## 2022-09-09 PROCEDURE — 99900035 HC TECH TIME PER 15 MIN (STAT)

## 2022-09-09 PROCEDURE — 27200667 HC PACEMAKER, TEMPORARY MONITORING, PER SHIFT

## 2022-09-09 PROCEDURE — 25000242 PHARM REV CODE 250 ALT 637 W/ HCPCS: Performed by: PHYSICIAN ASSISTANT

## 2022-09-09 PROCEDURE — 20000000 HC ICU ROOM

## 2022-09-09 PROCEDURE — 82803 BLOOD GASES ANY COMBINATION: CPT

## 2022-09-09 RX ORDER — OXYCODONE HCL 5 MG/5 ML
5 SOLUTION, ORAL ORAL EVERY 4 HOURS PRN
Status: DISCONTINUED | OUTPATIENT
Start: 2022-09-09 | End: 2022-09-16 | Stop reason: HOSPADM

## 2022-09-09 RX ORDER — FUROSEMIDE 10 MG/ML
20 INJECTION INTRAMUSCULAR; INTRAVENOUS ONCE
Status: DISCONTINUED | OUTPATIENT
Start: 2022-09-10 | End: 2022-09-09

## 2022-09-09 RX ORDER — INSULIN ASPART 100 [IU]/ML
1-10 INJECTION, SOLUTION INTRAVENOUS; SUBCUTANEOUS
Status: DISCONTINUED | OUTPATIENT
Start: 2022-09-09 | End: 2022-09-12

## 2022-09-09 RX ORDER — FUROSEMIDE 10 MG/ML
40 INJECTION INTRAMUSCULAR; INTRAVENOUS 2 TIMES DAILY
Status: DISCONTINUED | OUTPATIENT
Start: 2022-09-09 | End: 2022-09-16 | Stop reason: HOSPADM

## 2022-09-09 RX ORDER — LANOLIN ALCOHOL/MO/W.PET/CERES
800 CREAM (GRAM) TOPICAL
Status: DISCONTINUED | OUTPATIENT
Start: 2022-09-09 | End: 2022-09-14

## 2022-09-09 RX ORDER — SODIUM,POTASSIUM PHOSPHATES 280-250MG
2 POWDER IN PACKET (EA) ORAL
Status: DISCONTINUED | OUTPATIENT
Start: 2022-09-09 | End: 2022-09-14

## 2022-09-09 RX ORDER — ACETAMINOPHEN 500 MG
1000 TABLET ORAL EVERY 8 HOURS
Status: DISCONTINUED | OUTPATIENT
Start: 2022-09-09 | End: 2022-09-16 | Stop reason: HOSPADM

## 2022-09-09 RX ORDER — IBUPROFEN 200 MG
24 TABLET ORAL
Status: DISCONTINUED | OUTPATIENT
Start: 2022-09-09 | End: 2022-09-12

## 2022-09-09 RX ORDER — FUROSEMIDE 10 MG/ML
40 INJECTION INTRAMUSCULAR; INTRAVENOUS ONCE
Status: COMPLETED | OUTPATIENT
Start: 2022-09-10 | End: 2022-09-10

## 2022-09-09 RX ORDER — WARFARIN 4 MG/1
4 TABLET ORAL ONCE
Status: COMPLETED | OUTPATIENT
Start: 2022-09-09 | End: 2022-09-09

## 2022-09-09 RX ORDER — OXYCODONE HCL 5 MG/5 ML
10 SOLUTION, ORAL ORAL EVERY 4 HOURS PRN
Status: DISCONTINUED | OUTPATIENT
Start: 2022-09-09 | End: 2022-09-16 | Stop reason: HOSPADM

## 2022-09-09 RX ORDER — GLUCAGON 1 MG
1 KIT INJECTION
Status: DISCONTINUED | OUTPATIENT
Start: 2022-09-09 | End: 2022-09-12

## 2022-09-09 RX ORDER — FUROSEMIDE 10 MG/ML
40 INJECTION INTRAMUSCULAR; INTRAVENOUS 2 TIMES DAILY
Status: DISCONTINUED | OUTPATIENT
Start: 2022-09-09 | End: 2022-09-09

## 2022-09-09 RX ORDER — ACETAMINOPHEN 650 MG/20.3ML
1000 LIQUID ORAL EVERY 8 HOURS
Status: DISCONTINUED | OUTPATIENT
Start: 2022-09-09 | End: 2022-09-09

## 2022-09-09 RX ORDER — IBUPROFEN 200 MG
16 TABLET ORAL
Status: DISCONTINUED | OUTPATIENT
Start: 2022-09-09 | End: 2022-09-12

## 2022-09-09 RX ADMIN — MILRINONE LACTATE IN DEXTROSE 0.38 MCG/KG/MIN: 200 INJECTION, SOLUTION INTRAVENOUS at 02:09

## 2022-09-09 RX ADMIN — MILRINONE LACTATE IN DEXTROSE 0.5 MCG/KG/MIN: 200 INJECTION, SOLUTION INTRAVENOUS at 04:09

## 2022-09-09 RX ADMIN — FUROSEMIDE 40 MG: 10 INJECTION, SOLUTION INTRAMUSCULAR; INTRAVENOUS at 08:09

## 2022-09-09 RX ADMIN — DEXTROSE 2 G: 50 INJECTION, SOLUTION INTRAVENOUS at 03:09

## 2022-09-09 RX ADMIN — POTASSIUM & SODIUM PHOSPHATES POWDER PACK 280-160-250 MG 2 PACKET: 280-160-250 PACK at 01:09

## 2022-09-09 RX ADMIN — POTASSIUM & SODIUM PHOSPHATES POWDER PACK 280-160-250 MG 2 PACKET: 280-160-250 PACK at 06:09

## 2022-09-09 RX ADMIN — CLEVIPIDINE 2 MG/HR: 0.5 EMULSION INTRAVENOUS at 02:09

## 2022-09-09 RX ADMIN — DOCUSATE SODIUM 100 MG: 100 CAPSULE ORAL at 08:09

## 2022-09-09 RX ADMIN — DEXTROSE 2 G: 50 INJECTION, SOLUTION INTRAVENOUS at 08:09

## 2022-09-09 RX ADMIN — ACETAMINOPHEN 999.01 MG: 650 SOLUTION ORAL at 03:09

## 2022-09-09 RX ADMIN — POTASSIUM & SODIUM PHOSPHATES POWDER PACK 280-160-250 MG 2 PACKET: 280-160-250 PACK at 10:09

## 2022-09-09 RX ADMIN — OXYCODONE HYDROCHLORIDE 5 MG: 5 SOLUTION ORAL at 01:09

## 2022-09-09 RX ADMIN — MUPIROCIN 1 G: 20 OINTMENT TOPICAL at 08:09

## 2022-09-09 RX ADMIN — ASPIRIN 325 MG ORAL TABLET 325 MG: 325 PILL ORAL at 08:09

## 2022-09-09 RX ADMIN — IPRATROPIUM BROMIDE AND ALBUTEROL SULFATE 3 ML: 2.5; .5 SOLUTION RESPIRATORY (INHALATION) at 04:09

## 2022-09-09 RX ADMIN — ACETAMINOPHEN 1000 MG: 500 TABLET ORAL at 09:09

## 2022-09-09 RX ADMIN — POTASSIUM & SODIUM PHOSPHATES POWDER PACK 280-160-250 MG 2 PACKET: 280-160-250 PACK at 11:09

## 2022-09-09 RX ADMIN — IPRATROPIUM BROMIDE AND ALBUTEROL SULFATE 3 ML: 2.5; .5 SOLUTION RESPIRATORY (INHALATION) at 12:09

## 2022-09-09 RX ADMIN — POLYETHYLENE GLYCOL 3350 17 G: 17 POWDER, FOR SOLUTION ORAL at 08:09

## 2022-09-09 RX ADMIN — POTASSIUM BICARBONATE 50 MEQ: 977.5 TABLET, EFFERVESCENT ORAL at 10:09

## 2022-09-09 RX ADMIN — IPRATROPIUM BROMIDE AND ALBUTEROL SULFATE 3 ML: 2.5; .5 SOLUTION RESPIRATORY (INHALATION) at 07:09

## 2022-09-09 RX ADMIN — DEXTROSE 2 G: 50 INJECTION, SOLUTION INTRAVENOUS at 12:09

## 2022-09-09 RX ADMIN — FUROSEMIDE 40 MG: 10 INJECTION, SOLUTION INTRAMUSCULAR; INTRAVENOUS at 01:09

## 2022-09-09 RX ADMIN — WARFARIN SODIUM 4 MG: 4 TABLET ORAL at 08:09

## 2022-09-09 RX ADMIN — INSULIN ASPART 1 UNITS: 100 INJECTION, SOLUTION INTRAVENOUS; SUBCUTANEOUS at 08:09

## 2022-09-09 NOTE — ASSESSMENT & PLAN NOTE
BG goal 110-140 (CTS protocol)     Discontinue IV insulin infusion protocol (titrated off)  Start Moderate Dose Correction Scale  BG monitoring ac/hs     ** Please call Endocrine for any BG related issues **    Discharge planning: TBD

## 2022-09-09 NOTE — PLAN OF CARE
[facility]  Initial Discharge Assessment       Primary Care Provider: Marcin Farley MD    Admission Diagnosis: Nonrheumatic mitral valve regurgitation [I34.0]  Mitral regurgitation [I34.0]    Admission Date: 9/8/2022  Expected Discharge Date:     Discharge Barriers Identified: None    Payor: BLUE CROSS BLUE SHIELD / Plan: [place] PPO / Product Type: PPO /     Extended Emergency Contact Information  Primary Emergency Contact: Tanya Gordillo   [place]  Mobile Phone: [phone]  Relation: Spouse    Discharge Plan A: Home with family  Discharge Plan B: Home Health      [facility] - [place], [place] - [address]  [address]  [place] [address]  Phone: [phone] Fax: [phone]    [place], [place] - [address]  [address]  [place] [address]  Phone: [phone] Fax: [phone]      Initial Assessment (most recent)       Adult Discharge Assessment - 09/09/22 1111          Discharge Assessment    Assessment Type Discharge Planning Assessment     Confirmed/corrected address, phone number and insurance Yes     Confirmed Demographics Correct on Facesheet     Source of Information patient     Reason For Admission S/P mitral valve repair     Lives With spouse     Facility Arrived From: home     Do you expect to return to your current living situation? Yes     Do you have help at home or someone to help you manage your care at home? Yes     Who are your caregiver(s) and their phone number(s)? wife Tanya Gordillo [phone]     Prior to hospitilization cognitive status: Alert/Oriented     Current cognitive status: Alert/Oriented     Walking or Climbing Stairs Difficulty none     Dressing/Bathing Difficulty none     Equipment Currently Used at Home none     Patient currently being followed by outpatient case management? No     Do you currently have service(s) that help you manage your care at home? No     Who is going to help  you get home at discharge? son     How do you get to doctors appointments? family or friend will provide     Are you on dialysis? No     Do you take coumadin? No     Discharge Plan A Home with family     Discharge Plan B Home Health     DME Needed Upon Discharge  other (see comments)   TBD    Discharge Barriers Identified None                 CM spoke with patient in [id number] for DISCHARGE PLANNING ASSESSMENT. Per patient, he lives with his wife in a single family home with 0 steps to porch and point of entry.  Patient was independent with ADLS and DID NOT use DME or in-home assistive equipment.  Pt is not on dialysis or Coumadin, takes medications as prescribed / keeps refilled / has resources for all daily and prescriptive needs.  [facility]-Agreeable to bedside delivery.  Will have help from wife, son, and other immediate family upon discharge.  All questions addressed.  Will continue to follow for course of hospitalization.    *Pt states his son will provide transportation home at time of d/c.    Kelvin Simpson RN   [id number]  Case Management

## 2022-09-09 NOTE — SUBJECTIVE & OBJECTIVE
"Interval HPI:   Overnight events: Remains in SICU. POD 1. Extubated yesterday afternoon. BG within goal ranges with IV intensive insulin protocol off since yesterday afternoon. Diet progressed.   Eating:   Diet clear liquid    Nausea: No  Hypoglycemia and intervention: No  Fever: No  TPN and/or TF: No  If yes, type of TF/TPN and rate: n/a    /70 (BP Location: Right arm, Patient Position: Lying)   Pulse 73   Temp 99.9 °F (37.7 °C) (Oral)   Resp (!) 32   Ht 5' 11" (1.803 m)   Wt 89.9 kg (198 lb 3.1 oz)   SpO2 95%   BMI 27.64 kg/m²     Labs Reviewed and Include    Recent Labs   Lab 09/09/22  0301   *   CALCIUM 8.1*   ALBUMIN 2.9*   PROT 4.9*      K 4.5   CO2 20*      BUN 15   CREATININE 1.1   ALKPHOS 66   ALT 20   AST 69*   BILITOT 1.9*     Lab Results   Component Value Date    WBC 13.69 (H) 09/09/2022    WBC 13.69 (H) 09/09/2022    HGB 11.0 (L) 09/09/2022    HGB 11.0 (L) 09/09/2022    HCT 31.9 (L) 09/09/2022    HCT 31.9 (L) 09/09/2022    MCV 94 09/09/2022    MCV 94 09/09/2022    PLT 88 (L) 09/09/2022    PLT 88 (L) 09/09/2022     No results for input(s): TSH, FREET4 in the last 168 hours.  Lab Results   Component Value Date    HGBA1C 5.7 (H) 09/06/2022       Nutritional status:   Body mass index is 27.64 kg/m².  Lab Results   Component Value Date    ALBUMIN 2.9 (L) 09/09/2022    ALBUMIN 2.8 (L) 09/08/2022    ALBUMIN 2.9 (L) 09/08/2022     No results found for: PREALBUMIN    Estimated Creatinine Clearance: 75.1 mL/min (based on SCr of 1.1 mg/dL).    Accu-Checks  Recent Labs     09/08/22  0548 09/08/22  1407 09/08/22  1513 09/08/22  1533 09/08/22  1623 09/08/22  1911 09/08/22 1959   POCTGLUCOSE 90 143* 98 103 128* 131* 140*       Current Medications and/or Treatments Impacting Glycemic Control  Immunotherapy:    Immunosuppressants       None          Steroids:   Hormones (From admission, onward)      None          Pressors:    Autonomic Drugs (From admission, onward)      None      "     Hyperglycemia/Diabetes Medications:   Antihyperglycemics (From admission, onward)      None

## 2022-09-09 NOTE — PROGRESS NOTES
[facility]  Critical Care - Surgery  Progress Note    Patient Name: Torsten Gordillo  MRN: [id number]  Admission Date: 9/8/2022  Hospital Length of Stay: 1 days  Code Status: Full Code  Attending Provider: Jadiel Andrew MD  Primary Care Provider: Marcin Farley MD   Principal Problem: S/P mitral valve replacement    Subjective:     Hospital/ICU Course:  No notes on file    Interval History/Significant Events:   NAEO. CI slightly low on and off low dose epi.  Switched to back up rate for intrinsic rhythm 75  Extubated yesterday afternoon    Follow-up For: Procedure(s) (LRB):  REPLACEMENT, MITRAL VALVE (N/A)  REPAIR, TRICUSPID VALVE, WITH RING INSERTION (N/A)    Post-Operative Day: 1 Day Post-Op    Objective:     Vital Signs (Most Recent):  Temp: 99.9 °F (37.7 °C) (09/09/22 0500)  Pulse: 73 (09/09/22 0630)  Resp: (!) 27 (09/09/22 0630)  BP: 108/63 (09/09/22 0600)  SpO2: (!) 94 % (09/09/22 0630)   Vital Signs (24h Range):  Temp:  [95.6 °F (35.3 °C)-100.6 °F (38.1 °C)] 99.9 °F (37.7 °C)  Pulse:  [69-98] 73  Resp:  [0-35] 27  SpO2:  [67 %-100 %] 94 %  BP: ()/(57-77) 108/63  Arterial Line BP: ()/(49-77) 122/55     Weight: 89.9 kg (198 lb 3.1 oz)  Body mass index is 27.64 kg/m².      Intake/Output Summary (Last 24 hours) at 9/9/2022 0639  Last data filed at 9/9/2022 0600  Gross per 24 hour   Intake 3219.04 ml   Output 2850 ml   Net 369.04 ml       Physical Exam  Vitals and nursing note reviewed.   Constitutional:       Appearance: Normal appearance.   HENT:      Head: Normocephalic and atraumatic.   Neck:      Comments: MYESHA jhaveri  Cardiovascular:      Rate and Rhythm: Normal rate and regular rhythm.      Pulses: Normal pulses.      Comments: Intrinsic rhythm 75  Midline cdi   Pulmonary:      Effort: Pulmonary effort is normal. No respiratory distress.   Abdominal:      General: Abdomen is flat. There is no distension.      Palpations: Abdomen is soft.   Genitourinary:     Comments:  jojo  Skin:     General: Skin is warm and dry.   Neurological:      General: No focal deficit present.      Mental Status: He is alert and oriented to person, place, and time.   Psychiatric:         Mood and Affect: Mood normal.         Behavior: Behavior normal.       Lines/Drains/Airways       Central Venous Catheter Line  Duration             Pulmonary Artery Catheter Assessment  09/08/22 0717 <1 day              Drain  Duration                  Urethral Catheter 09/08/22 0723 Non-latex;Straight-tip;Temperature probe 14 Fr. <1 day         Y Chest Tube 1 and 2 09/08/22 1209 1 Anterior Mediastinal 19 Fr. 2 Posterior Mediastinal 19 Fr. <1 day              Arterial Line  Duration             Arterial Line 09/08/22 0707 Left Radial <1 day              Line  Duration                  Pacer Wires 09/08/22 1206 <1 day              Peripheral Intravenous Line  Duration                  Peripheral IV - Single Lumen 09/08/22 0549 18 G Anterior;Right Forearm 1 day                    Significant Labs:    CBC/Anemia Profile:  Recent Labs   Lab 09/08/22  1408 09/08/22  1409 09/08/22  1519 09/08/22  1627 09/09/22  0301   WBC 22.72*  --   --   --  13.69*  13.69*   HGB 11.3*  --   --   --  11.0*  11.0*   HCT 32.7*   < > 31* 38 31.9*  31.9*   *  --   --   --  88*  88*   MCV 99*  --   --   --  94  94   RDW 13.9  --   --   --  13.9  13.9    < > = values in this interval not displayed.        Chemistries:  Recent Labs   Lab 09/08/22  1408 09/08/22  1625 09/08/22  1913 09/08/22  2251 09/09/22  0301    141  --  140 137   K 5.0 5.6* 5.3* 4.4 4.5   * 114*  --  114* 109   CO2 21* 19*  --  18* 20*   BUN 14 14  --  15 15   CREATININE 1.1 1.2  --  1.1 1.1   CALCIUM 8.8 8.5*  --  7.8* 8.1*   ALBUMIN 2.9*  --   --  2.8* 2.9*   PROT 4.9*  --   --  4.7* 4.9*   BILITOT 1.4*  --   --  2.1* 1.9*   ALKPHOS 60  --   --  56 66   ALT 25  --   --  20 20   AST 67*  --   --  64* 69*   MG 2.4  2.4 2.4  --  2.0 2.0  2.0   PHOS 3.0   3.0 2.3*  --  2.1* 2.4*  2.4*       All pertinent labs within the past 24 hours have been reviewed.    Significant Imaging:  I have reviewed all pertinent imaging results/findings within the past 24 hours.    Assessment/Plan:     * S/P mitral valve replacement    Neuro/Psych:   -- Sedation: None  -- Pain: PRN Oxy  -- Scheduled Tylenol             Cards:   -- S/P MVR (bio), TVr on 9/8/22  -- HDS on 0.0.375 milrinone  -- Atrial and Ventricular pacing wires. Back up paced 45. Intrinsic rhythm 75  -- MAP  60-80, Syst < 140  -- Cleviprex PRN  -- Aspirin 325mg      Pulm:   -- Goal O2 sat > 90%  -- ABG PRN  -- Mediastinal chest tubes x2      Renal:  -- Keep uribe for strict I/O  -- Trend BUN/Cr 15/1.1      FEN / GI:   -- Replace lytes as needed  -- Nutrition: Advance as tolerated to Cardiac  -- GI ppx: not indicated  -- Bowel reg: miralax, docusate  -- Albumin PRN      ID:   -- Tm: afebrile; WBC stable  -- Mary-op ancef      Heme/Onc:   -- H/H stable 11  -- Daily CBC  -- 740 mL Cell saver given back  -- PTT 31.9  -- Aspirin 325mg QD      Endo:   -- BG goal 140-180  -- Insulin gtt  -- endocrinology consult      PPx:   Feeding: ADAT  Analgesia/Sedation: None / PRN Oxy; scheduled tylenol  Thromboembolic prevention: SCDs  HOB >30: Yes  Stress Ulcer ppx: famotidine  Glucose control: Critical care goal 140-180 g/dl, ISS     Lines/Drains/Airway: CVC RIJ, Uribe, Chest tubes x 3, atrial/ventricular pacing wires, L radial A-line      Dispo/Code Status/Palliative:   -- SICU / Full Code.             Jeremy Delatorre MD  Critical Care - Surgery  [facility] - [facility]

## 2022-09-09 NOTE — PLAN OF CARE
"      SICU PLAN OF CARE NOTE    SHIFT EVENTS:  VSS. Vtach event, 6 beat and 4 beat runs. Epi weaned off overnight. Managed pain. Tmax 100.6, oral tylenol given. Replaced Phos. Patient OOBTC. POC reviewed with patient and family; questions and concerns addressed. See flow sheets for full assessment details. Will continue to monitor patient closely.      Dx: S/P mitral valve replacement    Vital Signs: /71 (BP Location: Right arm, Patient Position: Lying)   Pulse 71   Temp (!) 100.6 °F (38.1 °C) (Oral)   Resp (!) 30   Ht 5' 11" (1.803 m)   Wt 85.7 kg (189 lb 0.7 oz)   SpO2 100%   BMI 26.37 kg/m²     Neuro: AAO x4, Sedated, and Follows Commands    Respiratory: Room air    Cardiac: AV wires connected to pacer. 100% paced at 70bpm    Diet: NPO    Gtts: Milrinone    Urine Output: Urinary Catheter 685 cc/shift    Drains:     Chest Tube, total output 240 cc /  shift     Labs:  daily, K Q12    Accuchecks: Q1.    SKIN NOTE:  Skin: Midsternal incision, dsg in place, CDI. No new skin breakdown or skin tears noted at this time.    Skin precautions maintained including:  Sacrum and heels with foam dressing in place for pressure protection. Frequent weight shift assistance provided Q2 hr to prevent breakdown. Bed plugged in and mattress inflated; Adhesive use limited. Heels elevated off bed. Pressure points protected and positioning supports utilized.  Skin-to-device areas padded. Skin-to-skin areas padded are    "

## 2022-09-09 NOTE — NURSING
Pt passed bedside swallow with water; however when given PO meds pt appeared to swallow meds, but did not. Meds found at back of tongue and unable to swallow them.

## 2022-09-09 NOTE — SUBJECTIVE & OBJECTIVE
Interval History/Significant Events:   NAEO. CI slightly low on and off low dose epi.  Switched to back up rate for intrinsic rhythm 75  Extubated yesterday afternoon    Follow-up For: Procedure(s) (LRB):  REPLACEMENT, MITRAL VALVE (N/A)  REPAIR, TRICUSPID VALVE, WITH RING INSERTION (N/A)    Post-Operative Day: 1 Day Post-Op    Objective:     Vital Signs (Most Recent):  Temp: 99.9 °F (37.7 °C) (09/09/22 0500)  Pulse: 73 (09/09/22 0630)  Resp: (!) 27 (09/09/22 0630)  BP: 108/63 (09/09/22 0600)  SpO2: (!) 94 % (09/09/22 0630)   Vital Signs (24h Range):  Temp:  [95.6 °F (35.3 °C)-100.6 °F (38.1 °C)] 99.9 °F (37.7 °C)  Pulse:  [69-98] 73  Resp:  [0-35] 27  SpO2:  [67 %-100 %] 94 %  BP: ()/(57-77) 108/63  Arterial Line BP: ()/(49-77) 122/55     Weight: 89.9 kg (198 lb 3.1 oz)  Body mass index is 27.64 kg/m².      Intake/Output Summary (Last 24 hours) at 9/9/2022 0639  Last data filed at 9/9/2022 0600  Gross per 24 hour   Intake 3219.04 ml   Output 2850 ml   Net 369.04 ml       Physical Exam  Vitals and nursing note reviewed.   Constitutional:       Appearance: Normal appearance.   HENT:      Head: Normocephalic and atraumatic.   Neck:      Comments: MYESHA jhaveri  Cardiovascular:      Rate and Rhythm: Normal rate and regular rhythm.      Pulses: Normal pulses.      Comments: Intrinsic rhythm 75  Midline cdi   Pulmonary:      Effort: Pulmonary effort is normal. No respiratory distress.   Abdominal:      General: Abdomen is flat. There is no distension.      Palpations: Abdomen is soft.   Genitourinary:     Comments: uribe  Skin:     General: Skin is warm and dry.   Neurological:      General: No focal deficit present.      Mental Status: He is alert and oriented to person, place, and time.   Psychiatric:         Mood and Affect: Mood normal.         Behavior: Behavior normal.       Lines/Drains/Airways       Central Venous Catheter Line  Duration             Pulmonary Artery Catheter Assessment  09/08/22 0717 <1  day              Drain  Duration                  Urethral Catheter 09/08/22 0723 Non-latex;Straight-tip;Temperature probe 14 Fr. <1 day         Y Chest Tube 1 and 2 09/08/22 1209 1 Anterior Mediastinal 19 Fr. 2 Posterior Mediastinal 19 Fr. <1 day              Arterial Line  Duration             Arterial Line 09/08/22 0707 Left Radial <1 day              Line  Duration                  Pacer Wires 09/08/22 1206 <1 day              Peripheral Intravenous Line  Duration                  Peripheral IV - Single Lumen 09/08/22 0549 18 G Anterior;Right Forearm 1 day                    Significant Labs:    CBC/Anemia Profile:  Recent Labs   Lab 09/08/22  1408 09/08/22  1409 09/08/22  1519 09/08/22  1627 09/09/22  0301   WBC 22.72*  --   --   --  13.69*  13.69*   HGB 11.3*  --   --   --  11.0*  11.0*   HCT 32.7*   < > 31* 38 31.9*  31.9*   *  --   --   --  88*  88*   MCV 99*  --   --   --  94  94   RDW 13.9  --   --   --  13.9  13.9    < > = values in this interval not displayed.        Chemistries:  Recent Labs   Lab 09/08/22  1408 09/08/22  1625 09/08/22  1913 09/08/22  2251 09/09/22  0301    141  --  140 137   K 5.0 5.6* 5.3* 4.4 4.5   * 114*  --  114* 109   CO2 21* 19*  --  18* 20*   BUN 14 14  --  15 15   CREATININE 1.1 1.2  --  1.1 1.1   CALCIUM 8.8 8.5*  --  7.8* 8.1*   ALBUMIN 2.9*  --   --  2.8* 2.9*   PROT 4.9*  --   --  4.7* 4.9*   BILITOT 1.4*  --   --  2.1* 1.9*   ALKPHOS 60  --   --  56 66   ALT 25  --   --  20 20   AST 67*  --   --  64* 69*   MG 2.4  2.4 2.4  --  2.0 2.0  2.0   PHOS 3.0  3.0 2.3*  --  2.1* 2.4*  2.4*       All pertinent labs within the past 24 hours have been reviewed.    Significant Imaging:  I have reviewed all pertinent imaging results/findings within the past 24 hours.

## 2022-09-09 NOTE — PROGRESS NOTES
"[facility]  Endocrinology  Progress Note    Admit Date: 9/8/2022     Reason for Consult: Management of T2DM, Hyperglycemia     Surgical Procedure and Date:   9/8/22  REPLACEMENT, MITRAL VALVE (N/A)  REPAIR, TRICUSPID VALVE, WITH RING INSERTION (N/A)     Diabetes diagnosis year: MONTEZ; intubated    Home Diabetes Medications: Amaryl 4 mg daily (per  chart review)     How often checking glucose at home? MONTEZ  BG readings on regimen: MONTEZ  Hypoglycemia on the regimen?  MONTEZ  Missed doses on regimen?  MONTEZ    Diabetes Complications include:     none    Complicating diabetes co morbidities:   JULIAN, HTN, HLD       HPI:   Patient is a 62 y.o. male with a diagnosis of COPD, DM2, JULIAN, Fatty Liver, HTN, HLD, dilated cardiomyopathy, and prostate cancer following with urology. He presents for pre-op Mv/R. He is s/p the above procedures. Endocrinology consulted for management of T2DM.     Lab Results   Component Value Date    HGBA1C 5.7 (H) 09/06/2022           Interval HPI:   Overnight events: Remains in SICU. POD 1. Extubated yesterday afternoon. BG within goal ranges with IV intensive insulin protocol off since yesterday afternoon. Diet progressed.   Eating:   Diet clear liquid    Nausea: No  Hypoglycemia and intervention: No  Fever: No  TPN and/or TF: No  If yes, type of TF/TPN and rate: n/a    /70 (BP Location: Right arm, Patient Position: Lying)   Pulse 73   Temp 99.9 °F (37.7 °C) (Oral)   Resp (!) 32   Ht 5' 11" (1.803 m)   Wt 89.9 kg (198 lb 3.1 oz)   SpO2 95%   BMI 27.64 kg/m²     Labs Reviewed and Include    Recent Labs   Lab 09/09/22  0301   *   CALCIUM 8.1*   ALBUMIN 2.9*   PROT 4.9*      K 4.5   CO2 20*      BUN 15   CREATININE 1.1   ALKPHOS 66   ALT 20   AST 69*   BILITOT 1.9*     Lab Results   Component Value Date    WBC 13.69 (H) 09/09/2022    WBC 13.69 (H) 09/09/2022    HGB 11.0 (L) 09/09/2022    HGB 11.0 (L) 09/09/2022    HCT 31.9 (L) 09/09/2022    HCT 31.9 (L) " 09/09/2022    MCV 94 09/09/2022    MCV 94 09/09/2022    PLT 88 (L) 09/09/2022    PLT 88 (L) 09/09/2022     No results for input(s): TSH, FREET4 in the last 168 hours.  Lab Results   Component Value Date    HGBA1C 5.7 (H) 09/06/2022       Nutritional status:   Body mass index is 27.64 kg/m².  Lab Results   Component Value Date    ALBUMIN 2.9 (L) 09/09/2022    ALBUMIN 2.8 (L) 09/08/2022    ALBUMIN 2.9 (L) 09/08/2022     No results found for: PREALBUMIN    Estimated Creatinine Clearance: 75.1 mL/min (based on SCr of 1.1 mg/dL).    Accu-Checks  Recent Labs     09/08/22  0548 09/08/22  1407 09/08/22  1513 09/08/22  1533 09/08/22  1623 09/08/22  1911 09/08/22  1959   POCTGLUCOSE 90 143* 98 103 128* 131* 140*       Current Medications and/or Treatments Impacting Glycemic Control  Immunotherapy:    Immunosuppressants       None          Steroids:   Hormones (From admission, onward)      None          Pressors:    Autonomic Drugs (From admission, onward)      None          Hyperglycemia/Diabetes Medications:   Antihyperglycemics (From admission, onward)      None            ASSESSMENT and PLAN    * S/P mitral valve replacement  Managed per primary team  Optimize BG control        Controlled type 2 diabetes mellitus, without long-term current use of insulin  BG goal 110-140 (CTS protocol)     Discontinue IV insulin infusion protocol (titrated off)  Start Moderate Dose Correction Scale  BG monitoring ac/hs     ** Please call Endocrine for any BG related issues **    Discharge planning: TBD        S/P tricuspid valve repair  Managed per primary team  Optimize BG control      Hyperlipidemia  May increase insulin resistance.             Irais Cartagena NP  Endocrinology  [facility] - Surgical Intensive Care

## 2022-09-09 NOTE — ASSESSMENT & PLAN NOTE
  Neuro/Psych:   -- Sedation: None  -- Pain: PRN Oxy  -- Scheduled Tylenol             Cards:   -- S/P MVR (bio), TVr on 9/8/22  -- HDS on 0.0.375 milrinone  -- Atrial and Ventricular pacing wires. Back up paced 45. Intrinsic rhythm 75  -- MAP  60-80, Syst < 140  -- Cleviprex PRN  -- Aspirin 325mg      Pulm:   -- Goal O2 sat > 90%  -- ABG PRN  -- Mediastinal chest tubes x2      Renal:  -- Keep uribe for strict I/O  -- Trend BUN/Cr 15/1.1      FEN / GI:   -- Replace lytes as needed  -- Nutrition: Advance as tolerated to Cardiac  -- GI ppx: not indicated  -- Bowel reg: miralax, docusate  -- Albumin PRN      ID:   -- Tm: afebrile; WBC stable  -- Mary-op ancef      Heme/Onc:   -- H/H stable 11  -- Daily CBC  -- 740 mL Cell saver given back  -- PTT 31.9  -- Aspirin 325mg QD      Endo:   -- BG goal 140-180  -- Insulin gtt  -- endocrinology consult      PPx:   Feeding: ADAT  Analgesia/Sedation: None / PRN Oxy; scheduled tylenol  Thromboembolic prevention: SCDs  HOB >30: Yes  Stress Ulcer ppx: famotidine  Glucose control: Critical care goal 140-180 g/dl, ISS     Lines/Drains/Airway: CVC MYESHA, Uribe, Chest tubes x 3, atrial/ventricular pacing wires, L radial A-line      Dispo/Code Status/Palliative:   -- SICU / Full Code.

## 2022-09-10 LAB
ALBUMIN SERPL BCP-MCNC: 2.9 G/DL (ref 3.5–5.2)
ALP SERPL-CCNC: 68 U/L (ref 55–135)
ALT SERPL W/O P-5'-P-CCNC: 13 U/L (ref 10–44)
ANION GAP SERPL CALC-SCNC: 10 MMOL/L (ref 8–16)
ANISOCYTOSIS BLD QL SMEAR: SLIGHT
ANISOCYTOSIS BLD QL SMEAR: SLIGHT
APTT BLDCRRT: 34.7 SEC (ref 21–32)
APTT BLDCRRT: 42.3 SEC (ref 21–32)
APTT BLDCRRT: 48.5 SEC (ref 21–32)
APTT BLDCRRT: 55.5 SEC (ref 21–32)
APTT BLDCRRT: 55.5 SEC (ref 21–32)
AST SERPL-CCNC: 51 U/L (ref 10–40)
BASO STIPL BLD QL SMEAR: ABNORMAL
BASO STIPL BLD QL SMEAR: ABNORMAL
BASOPHILS # BLD AUTO: 0.03 K/UL (ref 0–0.2)
BASOPHILS # BLD AUTO: 0.03 K/UL (ref 0–0.2)
BASOPHILS NFR BLD: 0.1 % (ref 0–1.9)
BASOPHILS NFR BLD: 0.1 % (ref 0–1.9)
BILIRUB SERPL-MCNC: 1.8 MG/DL (ref 0.1–1)
BLD PROD TYP BPU: NORMAL
BLOOD UNIT EXPIRATION DATE: NORMAL
BLOOD UNIT TYPE CODE: 6200
BLOOD UNIT TYPE: NORMAL
BUN SERPL-MCNC: 13 MG/DL (ref 8–23)
CALCIUM SERPL-MCNC: 8.1 MG/DL (ref 8.7–10.5)
CHLORIDE SERPL-SCNC: 103 MMOL/L (ref 95–110)
CO2 SERPL-SCNC: 24 MMOL/L (ref 23–29)
CODING SYSTEM: NORMAL
CREAT SERPL-MCNC: 0.9 MG/DL (ref 0.5–1.4)
DIFFERENTIAL METHOD: ABNORMAL
DIFFERENTIAL METHOD: ABNORMAL
DISPENSE STATUS: NORMAL
EOSINOPHIL # BLD AUTO: 0 K/UL (ref 0–0.5)
EOSINOPHIL # BLD AUTO: 0 K/UL (ref 0–0.5)
EOSINOPHIL NFR BLD: 0 % (ref 0–8)
EOSINOPHIL NFR BLD: 0 % (ref 0–8)
ERYTHROCYTE [DISTWIDTH] IN BLOOD BY AUTOMATED COUNT: 14.1 % (ref 11.5–14.5)
ERYTHROCYTE [DISTWIDTH] IN BLOOD BY AUTOMATED COUNT: 14.1 % (ref 11.5–14.5)
EST. GFR  (NO RACE VARIABLE): >60 ML/MIN/1.73 M^2
GLUCOSE SERPL-MCNC: 116 MG/DL (ref 70–110)
HCT VFR BLD AUTO: 32.2 % (ref 40–54)
HCT VFR BLD AUTO: 32.2 % (ref 40–54)
HGB BLD-MCNC: 11.4 G/DL (ref 14–18)
HGB BLD-MCNC: 11.4 G/DL (ref 14–18)
IMM GRANULOCYTES # BLD AUTO: 0.23 K/UL (ref 0–0.04)
IMM GRANULOCYTES # BLD AUTO: 0.23 K/UL (ref 0–0.04)
IMM GRANULOCYTES NFR BLD AUTO: 0.9 % (ref 0–0.5)
IMM GRANULOCYTES NFR BLD AUTO: 0.9 % (ref 0–0.5)
INR PPP: 1.1 (ref 0.8–1.2)
LYMPHOCYTES # BLD AUTO: 1.6 K/UL (ref 1–4.8)
LYMPHOCYTES # BLD AUTO: 1.6 K/UL (ref 1–4.8)
LYMPHOCYTES NFR BLD: 5.9 % (ref 18–48)
LYMPHOCYTES NFR BLD: 5.9 % (ref 18–48)
MAGNESIUM SERPL-MCNC: 1.9 MG/DL (ref 1.6–2.6)
MCH RBC QN AUTO: 33.8 PG (ref 27–31)
MCH RBC QN AUTO: 33.8 PG (ref 27–31)
MCHC RBC AUTO-ENTMCNC: 35.4 G/DL (ref 32–36)
MCHC RBC AUTO-ENTMCNC: 35.4 G/DL (ref 32–36)
MCV RBC AUTO: 96 FL (ref 82–98)
MCV RBC AUTO: 96 FL (ref 82–98)
MONOCYTES # BLD AUTO: 2.9 K/UL (ref 0.3–1)
MONOCYTES # BLD AUTO: 2.9 K/UL (ref 0.3–1)
MONOCYTES NFR BLD: 11.1 % (ref 4–15)
MONOCYTES NFR BLD: 11.1 % (ref 4–15)
NEUTROPHILS # BLD AUTO: 21.6 K/UL (ref 1.8–7.7)
NEUTROPHILS # BLD AUTO: 21.6 K/UL (ref 1.8–7.7)
NEUTROPHILS NFR BLD: 82 % (ref 38–73)
NEUTROPHILS NFR BLD: 82 % (ref 38–73)
NRBC BLD-RTO: 0 /100 WBC
NRBC BLD-RTO: 0 /100 WBC
NUM UNITS TRANS FFP: NORMAL
NUM UNITS TRANS FFP: NORMAL
PHOSPHATE SERPL-MCNC: 2.3 MG/DL (ref 2.7–4.5)
PHOSPHATE SERPL-MCNC: 2.4 MG/DL (ref 2.7–4.5)
PHOSPHATE SERPL-MCNC: 2.4 MG/DL (ref 2.7–4.5)
PLATELET # BLD AUTO: 97 K/UL (ref 150–450)
PLATELET # BLD AUTO: 97 K/UL (ref 150–450)
PLATELET BLD QL SMEAR: ABNORMAL
PLATELET BLD QL SMEAR: ABNORMAL
PMV BLD AUTO: 10.4 FL (ref 9.2–12.9)
PMV BLD AUTO: 10.4 FL (ref 9.2–12.9)
POCT GLUCOSE: 112 MG/DL (ref 70–110)
POCT GLUCOSE: 115 MG/DL (ref 70–110)
POCT GLUCOSE: 206 MG/DL (ref 70–110)
POCT GLUCOSE: 86 MG/DL (ref 70–110)
POCT GLUCOSE: 99 MG/DL (ref 70–110)
POTASSIUM SERPL-SCNC: 3.7 MMOL/L (ref 3.5–5.1)
POTASSIUM SERPL-SCNC: 3.9 MMOL/L (ref 3.5–5.1)
POTASSIUM SERPL-SCNC: 4 MMOL/L (ref 3.5–5.1)
PROT SERPL-MCNC: 5.2 G/DL (ref 6–8.4)
PROTHROMBIN TIME: 11.8 SEC (ref 9–12.5)
RBC # BLD AUTO: 3.37 M/UL (ref 4.6–6.2)
RBC # BLD AUTO: 3.37 M/UL (ref 4.6–6.2)
SODIUM SERPL-SCNC: 137 MMOL/L (ref 136–145)
TRANS ERYTHROCYTES VOL PATIENT: NORMAL ML
TRANS ERYTHROCYTES VOL PATIENT: NORMAL ML
WBC # BLD AUTO: 26.31 K/UL (ref 3.9–12.7)
WBC # BLD AUTO: 26.31 K/UL (ref 3.9–12.7)

## 2022-09-10 PROCEDURE — 85610 PROTHROMBIN TIME: CPT | Performed by: PHYSICIAN ASSISTANT

## 2022-09-10 PROCEDURE — 84132 ASSAY OF SERUM POTASSIUM: CPT | Mod: 91 | Performed by: THORACIC SURGERY (CARDIOTHORACIC VASCULAR SURGERY)

## 2022-09-10 PROCEDURE — 94664 DEMO&/EVAL PT USE INHALER: CPT

## 2022-09-10 PROCEDURE — 27000221 HC OXYGEN, UP TO 24 HOURS

## 2022-09-10 PROCEDURE — 97162 PT EVAL MOD COMPLEX 30 MIN: CPT

## 2022-09-10 PROCEDURE — 83735 ASSAY OF MAGNESIUM: CPT | Mod: 91 | Performed by: THORACIC SURGERY (CARDIOTHORACIC VASCULAR SURGERY)

## 2022-09-10 PROCEDURE — 83735 ASSAY OF MAGNESIUM: CPT | Performed by: PHYSICIAN ASSISTANT

## 2022-09-10 PROCEDURE — 97165 OT EVAL LOW COMPLEX 30 MIN: CPT

## 2022-09-10 PROCEDURE — 63600175 PHARM REV CODE 636 W HCPCS: Performed by: STUDENT IN AN ORGANIZED HEALTH CARE EDUCATION/TRAINING PROGRAM

## 2022-09-10 PROCEDURE — 63600175 PHARM REV CODE 636 W HCPCS

## 2022-09-10 PROCEDURE — 25000003 PHARM REV CODE 250: Performed by: STUDENT IN AN ORGANIZED HEALTH CARE EDUCATION/TRAINING PROGRAM

## 2022-09-10 PROCEDURE — 99291 CRITICAL CARE FIRST HOUR: CPT | Mod: ,,, | Performed by: ANESTHESIOLOGY

## 2022-09-10 PROCEDURE — 84100 ASSAY OF PHOSPHORUS: CPT | Performed by: PHYSICIAN ASSISTANT

## 2022-09-10 PROCEDURE — 27000646 HC AEROBIKA DEVICE

## 2022-09-10 PROCEDURE — 84132 ASSAY OF SERUM POTASSIUM: CPT | Performed by: PHYSICIAN ASSISTANT

## 2022-09-10 PROCEDURE — 97116 GAIT TRAINING THERAPY: CPT

## 2022-09-10 PROCEDURE — 20000000 HC ICU ROOM

## 2022-09-10 PROCEDURE — 94799 UNLISTED PULMONARY SVC/PX: CPT

## 2022-09-10 PROCEDURE — 80053 COMPREHEN METABOLIC PANEL: CPT | Performed by: STUDENT IN AN ORGANIZED HEALTH CARE EDUCATION/TRAINING PROGRAM

## 2022-09-10 PROCEDURE — 25000003 PHARM REV CODE 250

## 2022-09-10 PROCEDURE — 84100 ASSAY OF PHOSPHORUS: CPT | Mod: 91 | Performed by: THORACIC SURGERY (CARDIOTHORACIC VASCULAR SURGERY)

## 2022-09-10 PROCEDURE — 63600175 PHARM REV CODE 636 W HCPCS: Performed by: PHYSICIAN ASSISTANT

## 2022-09-10 PROCEDURE — 99291 PR CRITICAL CARE, E/M 30-74 MINUTES: ICD-10-PCS | Mod: ,,, | Performed by: ANESTHESIOLOGY

## 2022-09-10 PROCEDURE — 25000242 PHARM REV CODE 250 ALT 637 W/ HCPCS: Performed by: STUDENT IN AN ORGANIZED HEALTH CARE EDUCATION/TRAINING PROGRAM

## 2022-09-10 PROCEDURE — 85025 COMPLETE CBC W/AUTO DIFF WBC: CPT | Performed by: PHYSICIAN ASSISTANT

## 2022-09-10 PROCEDURE — 94761 N-INVAS EAR/PLS OXIMETRY MLT: CPT

## 2022-09-10 PROCEDURE — 85730 THROMBOPLASTIN TIME PARTIAL: CPT | Performed by: PHYSICIAN ASSISTANT

## 2022-09-10 PROCEDURE — 27200667 HC PACEMAKER, TEMPORARY MONITORING, PER SHIFT

## 2022-09-10 PROCEDURE — 85730 THROMBOPLASTIN TIME PARTIAL: CPT | Mod: 91

## 2022-09-10 PROCEDURE — 25000003 PHARM REV CODE 250: Performed by: PHYSICIAN ASSISTANT

## 2022-09-10 PROCEDURE — 97530 THERAPEUTIC ACTIVITIES: CPT

## 2022-09-10 PROCEDURE — 99900035 HC TECH TIME PER 15 MIN (STAT)

## 2022-09-10 RX ORDER — HEPARIN SODIUM 10000 [USP'U]/100ML
17 INJECTION, SOLUTION INTRAVENOUS CONTINUOUS
Status: DISCONTINUED | OUTPATIENT
Start: 2022-09-10 | End: 2022-09-10

## 2022-09-10 RX ORDER — HEPARIN SODIUM,PORCINE/D5W 25000/250
18 INTRAVENOUS SOLUTION INTRAVENOUS CONTINUOUS
Status: DISCONTINUED | OUTPATIENT
Start: 2022-09-10 | End: 2022-09-16

## 2022-09-10 RX ORDER — WARFARIN 4 MG/1
4 TABLET ORAL ONCE
Status: COMPLETED | OUTPATIENT
Start: 2022-09-10 | End: 2022-09-10

## 2022-09-10 RX ADMIN — DOCUSATE SODIUM 100 MG: 100 CAPSULE ORAL at 08:09

## 2022-09-10 RX ADMIN — OXYCODONE HYDROCHLORIDE 5 MG: 5 SOLUTION ORAL at 05:09

## 2022-09-10 RX ADMIN — POTASSIUM BICARBONATE 50 MEQ: 977.5 TABLET, EFFERVESCENT ORAL at 09:09

## 2022-09-10 RX ADMIN — MILRINONE LACTATE IN DEXTROSE 0.38 MCG/KG/MIN: 200 INJECTION, SOLUTION INTRAVENOUS at 12:09

## 2022-09-10 RX ADMIN — FUROSEMIDE 40 MG: 10 INJECTION, SOLUTION INTRAMUSCULAR; INTRAVENOUS at 09:09

## 2022-09-10 RX ADMIN — FUROSEMIDE 40 MG: 10 INJECTION, SOLUTION INTRAMUSCULAR; INTRAVENOUS at 08:09

## 2022-09-10 RX ADMIN — ACETAMINOPHEN 1000 MG: 500 TABLET ORAL at 05:09

## 2022-09-10 RX ADMIN — POTASSIUM & SODIUM PHOSPHATES POWDER PACK 280-160-250 MG 2 PACKET: 280-160-250 PACK at 04:09

## 2022-09-10 RX ADMIN — HEPARIN SODIUM 17 UNITS/KG/HR: 5000 INJECTION INTRAVENOUS; SUBCUTANEOUS at 04:09

## 2022-09-10 RX ADMIN — ASPIRIN 325 MG ORAL TABLET 325 MG: 325 PILL ORAL at 08:09

## 2022-09-10 RX ADMIN — MUPIROCIN 1 G: 20 OINTMENT TOPICAL at 09:09

## 2022-09-10 RX ADMIN — WARFARIN SODIUM 4 MG: 4 TABLET ORAL at 05:09

## 2022-09-10 RX ADMIN — POLYETHYLENE GLYCOL 3350 17 G: 17 POWDER, FOR SOLUTION ORAL at 08:09

## 2022-09-10 RX ADMIN — ACETAMINOPHEN 1000 MG: 500 TABLET ORAL at 01:09

## 2022-09-10 RX ADMIN — POTASSIUM & SODIUM PHOSPHATES POWDER PACK 280-160-250 MG 2 PACKET: 280-160-250 PACK at 08:09

## 2022-09-10 RX ADMIN — MUPIROCIN 1 G: 20 OINTMENT TOPICAL at 08:09

## 2022-09-10 RX ADMIN — MILRINONE LACTATE IN DEXTROSE 0.25 MCG/KG/MIN: 200 INJECTION, SOLUTION INTRAVENOUS at 10:09

## 2022-09-10 RX ADMIN — DEXTROSE 2 G: 50 INJECTION, SOLUTION INTRAVENOUS at 04:09

## 2022-09-10 RX ADMIN — FUROSEMIDE 40 MG: 10 INJECTION, SOLUTION INTRAMUSCULAR; INTRAVENOUS at 02:09

## 2022-09-10 RX ADMIN — ACETAMINOPHEN 1000 MG: 500 TABLET ORAL at 09:09

## 2022-09-10 NOTE — CARE UPDATE
BG goal 140-180  Diet clear liquid    Remains in SICU. POD 2. BG well controlled with minimal prn SQ insulin requirements. Endocrine to continue to follow; no changes to plan of care.       Plan:  -Continue Moderate Dose Correction Scale  -BG monitoring ac/hs    ** Please call Endocrine for any BG related issues **    Discharge plans: TBD    Lab Results   Component Value Date    HGBA1C 5.7 (H) 09/06/2022

## 2022-09-10 NOTE — PT/OT/SLP EVAL
"Physical Therapy Co-Evaluation and Co-Treatment    Patient Name:  Torsten Gordillo   MRN:  [id number]    Recommendations:     Discharge Recommendations:  home   Discharge Equipment Recommendations: none   Barriers to discharge: None    Assessment:     Torsten Gordillo is a 62 y.o. male admitted with a medical diagnosis of S/P mitral valve replacement.  He presents with the following impairments/functional limitations:  impaired endurance, gait instability, impaired balance, pain, impaired cardiopulmonary response to activity. Patient tolerated session well, no reports of SOB, spO2 >92% throughout.    Rehab Prognosis: Good; patient would benefit from acute skilled PT services 5 x/week to address these deficits and reach maximum level of function.  Recent Surgery: Procedure(s) (LRB):  REPLACEMENT, MITRAL VALVE (N/A)  REPAIR, TRICUSPID VALVE, WITH RING INSERTION (N/A) 2 Days Post-Op    Plan:     During this hospitalization, patient to be seen 5 x/week to address the identified rehab impairments via gait training, therapeutic activities, therapeutic exercises, neuromuscular re-education and progress toward the following goals:    Plan of Care Expires:  10/10/22    Subjective     Chief Complaint: Sternal incision pain  Patient/Family Comments/Goals: "I don't need to brush my teeth but I can walk to the bathroom"  Pain/Comfort:  Pain Rating 1: 6/10  Location - Orientation 1: generalized  Location 1: sternal  Pain Addressed 1: Distraction  Pain Rating Post-Intervention 1:  (not rated)    Patients cultural, spiritual, Zoroastrianism conflicts given the current situation: no    Living Environment:  Patient lives with their spouse in a single story home, number of outside stairs: 0, tub-shower combo.  Prior to admission, patient was independent with ADLs, driving, working as a . Patient uses DME as follows: none. DME owned (not currently used): none. Upon discharge, patient will have assistance from significant " other.    Objective:     Communicated with nursing prior to session.  Patient found up in chair with telemetry, blood pressure cuff, pulse ox (continuous), peripheral IV, central line, arterial line, external pacer, uribe catheter, chest tube, oxygen upon PT entry to room.    General Precautions: Standard, fall, sternal   Orthopedic Precautions:N/A   Braces: N/A    Exams:  Cognitive Exam:  Patient is oriented to Person, Place, Time, Situation, follows commands 100% of the time  RLE ROM: WFL  RLE Strength: WFL, grossly 5/5  LLE ROM: WFL  LLE Strength: WFL, grossly 5/5  Sensation: Intact light touch to BLEs    Functional Mobility:  Bed Mobility:     Seated in chair at start of session and returned to chair  Transfers:     Sit to Stand: contact guard assistance with no AD, cues for hand placement  Gait: Patient ambulated 3 ft forward/back 3 times, 20 ft with no AD and stand by assistance. Patient demonstrates decreased step length, narrow base of support, and decreased beth. Cuing for safety and step size. All lines remained intact throughout ambulation trial.  Balance:   Static Sitting: Good, able to maintain for 3 minute(s) with stand by assistance  Dynamic Sitting: Fair: Patient accepts minimal challenge, stand by assistance  Static Standing: Good, able to maintain for 10 seconds with stand by assistance  Dynamic Standing: Fair: Patient accepts minimal challenge, stand by assistance    Therapeutic Activities and Exercises:  Patient educated on role of acute care PT and PT POC, safety while in hospital including calling nurse for mobility, and call light usage  Patient is clear to ambulate to/from bathroom with RN/PCT, assist x1+1 to manage lines  Patient educated on Post-op sternal precautions, including no lifting > 5 lbs, pulling or pushing with BUEs.   Educated about importance of OOB mobility and remaining UIC most of the day    AM-PAC 6 CLICK MOBILITY  Total Score:18     Patient left up in chair with all  lines intact, call button in reach, and RN notified.    GOALS:   Multidisciplinary Problems       Physical Therapy Goals          Problem: Physical Therapy    Goal Priority Disciplines Outcome Goal Variances Interventions   Physical Therapy Goal     PT, PT/OT Ongoing, Progressing     Description: Goals to be met by: 2022     Patient will increase functional independence with mobility by performin. Supine to sit with independence  2. Sit to supine with independence  3. Sit to stand transfer with independence  4. Bed to chair transfer with independence  5. Gait  x 200 feet with independence  6. Lower extremity exercise program x10 reps per handout, with independence  7. Patient will recall 3/3 sternal precautions without cuing                        History:     Past Medical History:   Diagnosis Date    Bronchitis     Cardiomyopathy 2016    40-45% by SIMONA    COPD (chronic obstructive pulmonary disease)     Diabetes mellitus     DM (diabetes mellitus), type 2 2016    Fatty liver     Heart failure with reduced ejection fraction, NYHA class II 2021    EF 40% in 2021 NYHA III; sleeps in recliner, PND, rare LE edema    History of DVT (deep vein thrombosis) 2021    Hyperlipidemia 2016    Hypertension     Nonrheumatic mitral valve regurgitation 2021    Severe    Pulmonary arterial hypertension 2021    Moderate    Suspected sleep apnea 2016       Past Surgical History:   Procedure Laterality Date    BIOPSY WITH TRANSRECTAL ULTRASOUND (TRUS) GUIDANCE N/A 2022    Procedure: BIOPSY, WITH TRANSRECTAL US GUIDANCE;  Surgeon: Thomas Ventura II, MD;  Location: [facility];  Service: Urology;  Laterality: N/A;  prostate     COLONOSCOPY N/A 2016    Procedure: COLONOSCOPY;  Surgeon: Faith Harris MD;  Location: [facility];  Service: Endoscopy;  Laterality: N/A;    COLONOSCOPY N/A 2021    Procedure: COLONOSCOPY;  Surgeon: Luis Bogran-Reyes, MD;  Location: [facility];   Service: Endoscopy;  Laterality: N/A;    CYSTOSCOPY N/A 2/2/2022    Procedure: CYSTOSCOPY;  Surgeon: Thomas Ventura II, MD;  Location: [facility] OR;  Service: Urology;  Laterality: N/A;    LEFT HEART CATHETERIZATION Left 7/7/2022    Procedure: CATHETERIZATION, HEART, LEFT;  Surgeon: Quinn Duke MD;  Location: [facility] CATH LAB;  Service: Cardiology;  Laterality: Left;    MITRAL VALVE REPLACEMENT N/A 9/8/2022    Procedure: REPLACEMENT, MITRAL VALVE;  Surgeon: Jadiel Andrew MD;  Location: [address];  Service: Cardiothoracic;  Laterality: N/A;    REPAIR, TRICUSPID VALVE, WITH RING INSERTION N/A 9/8/2022    Procedure: REPAIR, TRICUSPID VALVE, WITH RING INSERTION;  Surgeon: Jadiel Andrew MD;  Location: [address];  Service: Cardiothoracic;  Laterality: N/A;       Time Tracking:     PT Received On: 09/10/22  PT Start Time: 0752     PT Stop Time: 0808  PT Total Time (min): 16 min     Billable Minutes: Evaluation 8 min Gait Training 8 min      09/10/2022    Co-evaluation and co-treatment performed for this visit due to suspected patient need for two skilled therapists to ensure patient and staff safety and to accommodate for patient activity tolerance/pain management

## 2022-09-10 NOTE — PLAN OF CARE
PT eval complete, plan of care established    9/10/2022    Problem: Physical Therapy  Goal: Physical Therapy Goal  Description: Goals to be met by: 2022     Patient will increase functional independence with mobility by performin. Supine to sit with independence  2. Sit to supine with independence  3. Sit to stand transfer with independence  4. Bed to chair transfer with independence  5. Gait  x 200 feet with independence  6. Lower extremity exercise program x10 reps per handout, with independence  7. Patient will recall 3/3 sternal precautions without cuing   Outcome: Ongoing, Progressing

## 2022-09-10 NOTE — PLAN OF CARE
Problem: Occupational Therapy  Goal: Occupational Therapy Goal  Description: Goals to be met by: 10/10/22     Patient will increase functional independence with ADLs by performing:    UE Dressing with Modified [place].  LE Dressing with Modified [place].  Grooming while standing at sink with Modified [place].  Toileting from toilet with Modified [place] for hygiene and clothing management.   Toilet transfer to toilet with Modified [place].    Outcome: Ongoing, Progressing     Pt was agreeable to and participated in OT/PT evaluation.  Pt reports that he was independent with mobility and ADLS without use of AD prior to hospitalization.  Pt performed func mobility with SBA and ADLs with set-up - mod A. Goals established to assist pt with returning to PLOF regarding ADLs and func mobility.  Pt will benefit from skilled OT services in order to increase his level of safety and independence with ADLs and mobility.      Trinity Gramajo, OT  9/10/2022

## 2022-09-10 NOTE — SUBJECTIVE & OBJECTIVE
Interval History/Significant Events:   NAEO. Started on heparin gtt and gave 4mg warfarin overnight  Making adequate urine  Pain well controlled    Follow-up For: Procedure(s) (LRB):  REPLACEMENT, MITRAL VALVE (N/A)  REPAIR, TRICUSPID VALVE, WITH RING INSERTION (N/A)    Post-Operative Day: 2 Days Post-Op    Objective:     Vital Signs (Most Recent):  Temp: 99.5 °F (37.5 °C) (09/10/22 0300)  Pulse: 80 (09/10/22 0430)  Resp: 14 (09/10/22 0430)  BP: 109/69 (09/10/22 0400)  SpO2: 96 % (09/10/22 0430) Vital Signs (24h Range):  Temp:  [98.7 °F (37.1 °C)-100.2 °F (37.9 °C)] 99.5 °F (37.5 °C)  Pulse:  [69-84] 80  Resp:  [1-38] 14  SpO2:  [91 %-99 %] 96 %  BP: (105-122)/(63-77) 109/69  Arterial Line BP: (101-149)/(48-76) 130/58     Weight: 89.9 kg (198 lb 3.1 oz)  Body mass index is 27.64 kg/m².      Intake/Output Summary (Last 24 hours) at 9/10/2022 0500  Last data filed at 9/10/2022 0400  Gross per 24 hour   Intake 1434.03 ml   Output 5650 ml   Net -4215.97 ml       Physical Exam  Vitals and nursing note reviewed.   Constitutional:       Appearance: Normal appearance.   HENT:      Head: Normocephalic and atraumatic.   Neck:      Comments: MYESHA jhaveri  Cardiovascular:      Rate and Rhythm: Normal rate and regular rhythm.      Pulses: Normal pulses.      Comments: Intrinsic rhythm 75  Midline cdi   Pulmonary:      Effort: Pulmonary effort is normal. No respiratory distress.   Abdominal:      General: Abdomen is flat. There is no distension.      Palpations: Abdomen is soft.   Genitourinary:     Comments: uribe  Skin:     General: Skin is warm and dry.   Neurological:      General: No focal deficit present.      Mental Status: He is alert and oriented to person, place, and time.   Psychiatric:         Mood and Affect: Mood normal.         Behavior: Behavior normal.         Lines/Drains/Airways       Central Venous Catheter Line  Duration             Introducer 09/09/22 1136 right internal jugular <1 day              Drain   Duration                  Urethral Catheter 09/08/22 0723 Non-latex;Straight-tip;Temperature probe 14 Fr. 1 day         Y Chest Tube 1 and 2 09/08/22 1209 1 Anterior Mediastinal 19 Fr. 2 Posterior Mediastinal 19 Fr. 1 day              Arterial Line  Duration             Arterial Line 09/08/22 0707 Left Radial 1 day              Line  Duration                  Pacer Wires 09/08/22 1206 1 day              Peripheral Intravenous Line  Duration                  Peripheral IV - Single Lumen 09/08/22 0549 18 G Anterior;Right Forearm 1 day                    Significant Labs:    CBC/Anemia Profile:  Recent Labs   Lab 09/08/22  1408 09/08/22  1409 09/08/22  1627 09/09/22  0301 09/10/22  0230   WBC 22.72*  --   --  13.69*  13.69* 26.31*  26.31*   HGB 11.3*  --   --  11.0*  11.0* 11.4*  11.4*   HCT 32.7*   < > 38 31.9*  31.9* 32.2*  32.2*   *  --   --  88*  88* 97*  97*   MCV 99*  --   --  94  94 96  96   RDW 13.9  --   --  13.9  13.9 14.1  14.1    < > = values in this interval not displayed.        Chemistries:  Recent Labs   Lab 09/08/22  2251 09/09/22  0301 09/09/22 2005 09/09/22  2203 09/10/22  0230    137  --   --  137   K 4.4 4.5 3.7 3.6 3.9   * 109  --   --  103   CO2 18* 20*  --   --  24   BUN 15 15  --   --  13   CREATININE 1.1 1.1  --   --  0.9   CALCIUM 7.8* 8.1*  --   --  8.1*   ALBUMIN 2.8* 2.9*  --   --  2.9*   PROT 4.7* 4.9*  --   --  5.2*   BILITOT 2.1* 1.9*  --   --  1.8*   ALKPHOS 56 66  --   --  68   ALT 20 20  --   --  13   AST 64* 69*  --   --  51*   MG 2.0 2.0  2.0 2.0  --  1.9  1.9   PHOS 2.1* 2.4*  2.4* 2.3*  --  2.4*  2.4*       All pertinent labs within the past 24 hours have been reviewed.    Significant Imaging:  I have reviewed all pertinent imaging results/findings within the past 24 hours.

## 2022-09-10 NOTE — PROGRESS NOTES
[facility] - [facility]  Critical Care - Surgery  Progress Note    Patient Name: Torsten Gordillo  MRN: [id number]  Admission Date: 9/8/2022  Hospital Length of Stay: 2 days  Code Status: Full Code  Attending Provider: Jadiel Andrew MD  Primary Care Provider: Marcin Farley MD   Principal Problem: S/P mitral valve replacement    Subjective:     Hospital/ICU Course:  No notes on file    Interval History/Significant Events:   NAEO. Started on heparin gtt and gave 4mg warfarin overnight  Making adequate urine  Pain well controlled    Follow-up For: Procedure(s) (LRB):  REPLACEMENT, MITRAL VALVE (N/A)  REPAIR, TRICUSPID VALVE, WITH RING INSERTION (N/A)    Post-Operative Day: 2 Days Post-Op    Objective:     Vital Signs (Most Recent):  Temp: 99.5 °F (37.5 °C) (09/10/22 0300)  Pulse: 80 (09/10/22 0430)  Resp: 14 (09/10/22 0430)  BP: 109/69 (09/10/22 0400)  SpO2: 96 % (09/10/22 0430) Vital Signs (24h Range):  Temp:  [98.7 °F (37.1 °C)-100.2 °F (37.9 °C)] 99.5 °F (37.5 °C)  Pulse:  [69-84] 80  Resp:  [1-38] 14  SpO2:  [91 %-99 %] 96 %  BP: (105-122)/(63-77) 109/69  Arterial Line BP: (101-149)/(48-76) 130/58     Weight: 89.9 kg (198 lb 3.1 oz)  Body mass index is 27.64 kg/m².      Intake/Output Summary (Last 24 hours) at 9/10/2022 0500  Last data filed at 9/10/2022 0400  Gross per 24 hour   Intake 1434.03 ml   Output 5650 ml   Net -4215.97 ml       Physical Exam  Vitals and nursing note reviewed.   Constitutional:       Appearance: Normal appearance.   HENT:      Head: Normocephalic and atraumatic.   Neck:      Comments: MYESHA jhaveri  Cardiovascular:      Rate and Rhythm: Normal rate and regular rhythm.      Pulses: Normal pulses.      Comments: Intrinsic rhythm 75  Midline cdi   Pulmonary:      Effort: Pulmonary effort is normal. No respiratory distress.   Abdominal:      General: Abdomen is flat. There is no distension.      Palpations: Abdomen is soft.   Genitourinary:     Comments: uribe  Skin:     General:  Skin is warm and dry.   Neurological:      General: No focal deficit present.      Mental Status: He is alert and oriented to person, place, and time.   Psychiatric:         Mood and Affect: Mood normal.         Behavior: Behavior normal.         Lines/Drains/Airways       Central Venous Catheter Line  Duration             Introducer 09/09/22 1136 right internal jugular <1 day              Drain  Duration                  Urethral Catheter 09/08/22 0723 Non-latex;Straight-tip;Temperature probe 14 Fr. 1 day         Y Chest Tube 1 and 2 09/08/22 1209 1 Anterior Mediastinal 19 Fr. 2 Posterior Mediastinal 19 Fr. 1 day              Arterial Line  Duration             Arterial Line 09/08/22 0707 Left Radial 1 day              Line  Duration                  Pacer Wires 09/08/22 1206 1 day              Peripheral Intravenous Line  Duration                  Peripheral IV - Single Lumen 09/08/22 0549 18 G Anterior;Right Forearm 1 day                    Significant Labs:    CBC/Anemia Profile:  Recent Labs   Lab 09/08/22  1408 09/08/22  1409 09/08/22  1627 09/09/22  0301 09/10/22  0230   WBC 22.72*  --   --  13.69*  13.69* 26.31*  26.31*   HGB 11.3*  --   --  11.0*  11.0* 11.4*  11.4*   HCT 32.7*   < > 38 31.9*  31.9* 32.2*  32.2*   *  --   --  88*  88* 97*  97*   MCV 99*  --   --  94  94 96  96   RDW 13.9  --   --  13.9  13.9 14.1  14.1    < > = values in this interval not displayed.        Chemistries:  Recent Labs   Lab 09/08/22 2251 09/09/22  0301 09/09/22 2005 09/09/22 2203 09/10/22  0230    137  --   --  137   K 4.4 4.5 3.7 3.6 3.9   * 109  --   --  103   CO2 18* 20*  --   --  24   BUN 15 15  --   --  13   CREATININE 1.1 1.1  --   --  0.9   CALCIUM 7.8* 8.1*  --   --  8.1*   ALBUMIN 2.8* 2.9*  --   --  2.9*   PROT 4.7* 4.9*  --   --  5.2*   BILITOT 2.1* 1.9*  --   --  1.8*   ALKPHOS 56 66  --   --  68   ALT 20 20  --   --  13   AST 64* 69*  --   --  51*   MG 2.0 2.0  2.0 2.0  --  1.9   1.9   PHOS 2.1* 2.4*  2.4* 2.3*  --  2.4*  2.4*       All pertinent labs within the past 24 hours have been reviewed.    Significant Imaging:  I have reviewed all pertinent imaging results/findings within the past 24 hours.    Assessment/Plan:     * S/P mitral valve replacement    Neuro/Psych:   -- Sedation: None  -- Pain: PRN Oxy  -- Scheduled Tylenol             Cards:   -- S/P MVR (bio), TVr on 9/8/22  -- HDS on 0.375 milrinone  -- Atrial and Ventricular pacing wires. Back up paced 45. Intrinsic rhythm 75  -- MAP  60-80, Syst < 140  -- Cleviprex PRN  -- Aspirin 325mg      Pulm:   -- Goal O2 sat > 90%  -- ABG PRN  -- Mediastinal chest tubes x2      Renal:  -- Keep uribe for strict I/O  -- Trend BUN/Cr 13 from 15/1.1      FEN / GI:   -- Replace lytes as needed  -- Nutrition: Advance as tolerated to Cardiac  -- GI ppx: not indicated  -- Bowel reg: miralax, docusate  -- Albumin PRN      ID:   -- Tm: afebrile; WBC stable  -- Mary-op ancef      Heme/Onc:   -- H/H stable 11  -- Daily CBC  -- 740 mL Cell saver given back  -- PTT 34.7  -- Aspirin 325mg QD  -- Gave warfarin 4mg ON, started on heparin gtt goal ptt 60-80; Requires AC x 3 months      Endo:   -- BG goal 140-180  -- Insulin gtt  -- endocrinology consult      PPx:   Feeding: cardiac diet  Analgesia/Sedation: None / PRN Oxy; scheduled tylenol  Thromboembolic prevention: SCDs, Heparin gtt, warfarin   HOB >30: Yes  Stress Ulcer ppx: famotidine  Glucose control: Critical care goal 140-180 g/dl, ISS     Lines/Drains/Airway: CVC RIJ, Uribe, Chest tubes x 3, atrial/ventricular pacing wires, L radial A-line      Dispo/Code Status/Palliative:   -- SICU / Full Code.             Jeremy Delatorre MD  Critical Care - Surgery  [facility] - [facility]

## 2022-09-10 NOTE — PT/OT/SLP EVAL
Occupational Therapy   Evaluation & Tx    Name: Torsten Gordillo  MRN: [id number]  Admitting Diagnosis:  S/P mitral valve replacement  Recent Surgery: Procedure(s) (LRB):  REPLACEMENT, MITRAL VALVE (N/A)  REPAIR, TRICUSPID VALVE, WITH RING INSERTION (N/A) 2 Days Post-Op    Recommendations:     Discharge Recommendations: home (with family supports)  Discharge Equipment Recommendations:  none  Barriers to discharge:  None    Assessment:     Torsten Gordillo is a 62 y.o. male with a medical diagnosis of S/P mitral valve replacement.  He presents with the following performance deficits affecting function: weakness, impaired endurance, impaired self care skills, gait instability, impaired functional mobility, decreased upper extremity function, pain, impaired skin, edema, impaired cardiopulmonary response to activity.  Pt was agreeable to and participated in OT/PT evaluation.  Pt reports that he was independent with mobility and ADLS without use of AD prior to hospitalization.  Pt performed func mobility with SBA and ADLs with set-up - mod A. Goals established to assist pt with returning to [facility] regarding ADLs and func mobility.  Pt will benefit from skilled OT services in order to increase his level of safety and independence with ADLs and mobility.      Rehab Prognosis: Good; patient would benefit from acute skilled OT services to address these deficits and reach maximum level of function.       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  Patient found up in chair with arterial line, blood pressure cuff, chest tube, external pacer, central line, uribe catheter, oxygen, peripheral IV, pulse ox (continuous), telemetry upon OT entry to room.    General Precautions: Standard, fall, sternal   Orthopedic Precautions:N/A   Braces: N/A  Respiratory Status: Nasal cannula, flow 2 L/min    Occupational Performance:    Bed Mobility:    N/A - sitting in bedside chair before/after session    Functional Mobility/Transfers:  Patient completed Sit <> Stand Transfer with stand by assistance  with  no assistive device   Functional Mobility: pt stood and walked short distance in room (limited by lines/connections) with SBA    Activities of Daily Living:  Grooming: set up A    Lower Body Dressing: moderate assistance - able to cross legs to reach feet to doff socks - some difficulty donning socks due to pain in chest when leaning over    Cognitive/Visual Perceptual:  Cognitive/Psychosocial Skills:     -       Oriented to: Person, Place, Time, and Situation   -       Follows Commands/attention:Follows one-step commands  -       Communication: clear/fluent  -       Memory: No Deficits noted  -       Safety awareness/insight to disability: impaired   -       Mood/Affect/Coping skills/emotional control: Appropriate to situation    Physical Exam:  Balance: -       sit = WFL;  stand = SBA  Postural examination/scapula alignment:    -       Rounded shoulders  -       Forward head  Skin integrity: sternal incision  Edema:  Mild hands  Sensation: -       Intact  Dominant hand:  -       right  Upper Extremity Range of Motion:     -       Right Upper Extremity: WFL  -       Left Upper Extremity: WFL  Upper Extremity Strength:    -       Right Upper Extremity: NT - able to move against gravity  -       Left Upper Extremity: NT - able to move against gravity   Strength:    -       Right Upper Extremity: WFL  -       Left Upper Extremity: WFL    AMPAC 6 Click ADL:  AMPAC Total Score: 17    Treatment & Education:  Pt completed ADLs and func mobility activities for tx session as noted above  Pt educated on sternal precautions - able to perform sit to stand without using arms to push/pull  Whiteboard updated  Pt educated on role of OT and POC      Patient left up in chair with all lines intact, call button in reach, and nurse notified    GOALS:   Multidisciplinary Problems       Occupational Therapy Goals          Problem: Occupational Therapy    Goal Priority Disciplines Outcome Interventions   Occupational Therapy Goal     OT, PT/OT Ongoing, Progressing    Description: Goals to be met by: 10/10/22     Patient will increase functional independence with ADLs by performing:    UE Dressing with Modified Oliver.  LE Dressing with Modified Oliver.  Grooming while standing at sink with Modified Oliver.  Toileting from toilet with Modified Oliver for hygiene and clothing management.   Toilet transfer to toilet with Modified Oliver.                         History:     Past Medical History:   Diagnosis Date    Bronchitis     Cardiomyopathy 5/2/2016    40-45% by SIMONA    COPD (chronic obstructive pulmonary disease)     Diabetes mellitus     DM (diabetes mellitus), type 2 5/11/2016    Fatty liver     Heart failure with reduced ejection fraction, NYHA class II 7/9/2021    EF 40% in 04/2021 NYHA III; sleeps in recliner, PND, rare LE edema    History of DVT (deep vein thrombosis) 2/23/2021    Hyperlipidemia 5/2/2016    Hypertension     Nonrheumatic mitral valve regurgitation  5/31/2021    Severe    Pulmonary arterial hypertension 5/31/2021    Moderate    Suspected sleep apnea 5/2/2016         Past Surgical History:   Procedure Laterality Date    BIOPSY WITH TRANSRECTAL ULTRASOUND (TRUS) GUIDANCE N/A 2/2/2022    Procedure: BIOPSY, WITH TRANSRECTAL US GUIDANCE;  Surgeon: Thomas Ventura II, MD;  Location: [facility];  Service: Urology;  Laterality: N/A;  prostate     COLONOSCOPY N/A 7/5/2016    Procedure: COLONOSCOPY;  Surgeon: Faith Harris MD;  Location: [facility];  Service: Endoscopy;  Laterality: N/A;    COLONOSCOPY N/A 11/16/2021    Procedure: COLONOSCOPY;  Surgeon: Luis Bogran-Reyes, MD;  Location: [facility] ENDO;  Service: Endoscopy;  Laterality: N/A;    CYSTOSCOPY N/A 2/2/2022    Procedure: CYSTOSCOPY;  Surgeon: Thomas Ventura II, MD;  Location: [facility];  Service: Urology;  Laterality: N/A;    LEFT HEART CATHETERIZATION Left 7/7/2022    Procedure: CATHETERIZATION, HEART, LEFT;  Surgeon: Quinn Duke MD;  Location: [facility] CATH LAB;  Service: Cardiology;  Laterality: Left;    MITRAL VALVE REPLACEMENT N/A 9/8/2022    Procedure: REPLACEMENT, MITRAL VALVE;  Surgeon: Jadiel Andrew MD;  Location: [facility] FLR;  Service: Cardiothoracic;  Laterality: N/A;    REPAIR, TRICUSPID VALVE, WITH RING INSERTION N/A 9/8/2022    Procedure: REPAIR, TRICUSPID VALVE, WITH RING INSERTION;  Surgeon: Jadiel Andrew MD;  Location: [facility] OR 2ND FLR;  Service: Cardiothoracic;  Laterality: N/A;       Time Tracking:     OT Date of Treatment: 09/10/22  OT Start Time: 0751  OT Stop Time: 0809  OT Total Time (min): 18 min (16 minutes billable)    Billable Minutes:Evaluation 8 (coeval with PT)  Therapeutic Activity 8    9/10/2022   LACERATION

## 2022-09-10 NOTE — ASSESSMENT & PLAN NOTE
  Neuro/Psych:   -- Sedation: None  -- Pain: PRN Oxy  -- Scheduled Tylenol             Cards:   -- S/P MVR (bio), TVr on 9/8/22  -- HDS on 0.375 milrinone  -- Atrial and Ventricular pacing wires. Back up paced 45. Intrinsic rhythm 75  -- MAP  60-80, Syst < 140  -- Cleviprex PRN  -- Aspirin 325mg      Pulm:   -- Goal O2 sat > 90%  -- ABG PRN  -- Mediastinal chest tubes x2      Renal:  -- Keep uribe for strict I/O  -- Trend BUN/Cr 13 from 15/1.1      FEN / GI:   -- Replace lytes as needed  -- Nutrition: Advance as tolerated to Cardiac  -- GI ppx: not indicated  -- Bowel reg: miralax, docusate  -- Albumin PRN      ID:   -- Tm: afebrile; WBC stable  -- Mary-op ancef      Heme/Onc:   -- H/H stable 11  -- Daily CBC  -- 740 mL Cell saver given back  -- PTT 34.7  -- Aspirin 325mg QD  -- Gave warfarin 4mg ON, started on heparin gtt goal ptt 60-80; Requires AC x 3 months      Endo:   -- BG goal 140-180  -- Insulin gtt  -- endocrinology consult      PPx:   Feeding: cardiac diet  Analgesia/Sedation: None / PRN Oxy; scheduled tylenol  Thromboembolic prevention: SCDs, Heparin gtt, warfarin   HOB >30: Yes  Stress Ulcer ppx: famotidine  Glucose control: Critical care goal 140-180 g/dl, ISS     Lines/Drains/Airway: CVC RIJ, Uribe, Chest tubes x 3, atrial/ventricular pacing wires, L radial A-line      Dispo/Code Status/Palliative:   -- SICU / Full Code.

## 2022-09-10 NOTE — NURSING
"      SICU PLAN OF CARE NOTE    Dx: S/P mitral valve replacement    Shift Events: NAEON    Goals of Care: MAPs 60-80    Neuro: AAO x4, Follows Commands, and Moves All Extremities    Vital Signs: /69 (BP Location: Right arm, Patient Position: Lying)   Pulse 80   Temp 99.5 °F (37.5 °C) (Oral)   Resp 14   Ht 5' 11" (1.803 m)   Wt 89.9 kg (198 lb 3.1 oz)   SpO2 96%   BMI 27.64 kg/m²     Respiratory: Nasal Cannula 2L    Diet: Clear Liquid    Gtts:    Heparin @ 17U/kg/hr   Cleviprex @ 3 mg/hr   Primacor @ 0.375 mcg/kg/min    Urine Output: Urinary Catheter 3675 cc/shift    Drains: Chest Tube, total output 230 cc /  shift     Labs/Accuchecks: Accuchecks ACHS    Skin: Midsternal incision and chest tube dressings CDI. Foams in place. Waffle mattress inflated. OOBTC this AM.       "

## 2022-09-11 LAB
ALBUMIN SERPL BCP-MCNC: 2.5 G/DL (ref 3.5–5.2)
ALP SERPL-CCNC: 75 U/L (ref 55–135)
ALT SERPL W/O P-5'-P-CCNC: 11 U/L (ref 10–44)
ANION GAP SERPL CALC-SCNC: 9 MMOL/L (ref 8–16)
APTT BLDCRRT: 35.9 SEC (ref 21–32)
APTT BLDCRRT: 37 SEC (ref 21–32)
APTT BLDCRRT: 45.3 SEC (ref 21–32)
AST SERPL-CCNC: 34 U/L (ref 10–40)
BASOPHILS # BLD AUTO: 0.01 K/UL (ref 0–0.2)
BASOPHILS NFR BLD: 0.1 % (ref 0–1.9)
BILIRUB SERPL-MCNC: 1.5 MG/DL (ref 0.1–1)
BUN SERPL-MCNC: 15 MG/DL (ref 8–23)
CALCIUM SERPL-MCNC: 8.4 MG/DL (ref 8.7–10.5)
CHLORIDE SERPL-SCNC: 101 MMOL/L (ref 95–110)
CO2 SERPL-SCNC: 23 MMOL/L (ref 23–29)
CREAT SERPL-MCNC: 0.9 MG/DL (ref 0.5–1.4)
DIFFERENTIAL METHOD: ABNORMAL
EOSINOPHIL # BLD AUTO: 0 K/UL (ref 0–0.5)
EOSINOPHIL NFR BLD: 0.2 % (ref 0–8)
ERYTHROCYTE [DISTWIDTH] IN BLOOD BY AUTOMATED COUNT: 14 % (ref 11.5–14.5)
EST. GFR  (NO RACE VARIABLE): >60 ML/MIN/1.73 M^2
GLUCOSE SERPL-MCNC: 84 MG/DL (ref 70–110)
HCT VFR BLD AUTO: 32.3 % (ref 40–54)
HGB BLD-MCNC: 11 G/DL (ref 14–18)
IMM GRANULOCYTES # BLD AUTO: 0.09 K/UL (ref 0–0.04)
IMM GRANULOCYTES NFR BLD AUTO: 0.5 % (ref 0–0.5)
INR PPP: 1.2 (ref 0.8–1.2)
LYMPHOCYTES # BLD AUTO: 1.7 K/UL (ref 1–4.8)
LYMPHOCYTES NFR BLD: 8.5 % (ref 18–48)
MAGNESIUM SERPL-MCNC: 1.9 MG/DL (ref 1.6–2.6)
MCH RBC QN AUTO: 33.6 PG (ref 27–31)
MCHC RBC AUTO-ENTMCNC: 34.1 G/DL (ref 32–36)
MCV RBC AUTO: 99 FL (ref 82–98)
MONOCYTES # BLD AUTO: 2 K/UL (ref 0.3–1)
MONOCYTES NFR BLD: 9.8 % (ref 4–15)
NEUTROPHILS # BLD AUTO: 16.1 K/UL (ref 1.8–7.7)
NEUTROPHILS NFR BLD: 80.9 % (ref 38–73)
NRBC BLD-RTO: 0 /100 WBC
PHOSPHATE SERPL-MCNC: 2.6 MG/DL (ref 2.7–4.5)
PLATELET # BLD AUTO: 110 K/UL (ref 150–450)
PMV BLD AUTO: 10.3 FL (ref 9.2–12.9)
POCT GLUCOSE: 116 MG/DL (ref 70–110)
POCT GLUCOSE: 121 MG/DL (ref 70–110)
POCT GLUCOSE: 131 MG/DL (ref 70–110)
POCT GLUCOSE: 92 MG/DL (ref 70–110)
POTASSIUM SERPL-SCNC: 3.9 MMOL/L (ref 3.5–5.1)
POTASSIUM SERPL-SCNC: 4 MMOL/L (ref 3.5–5.1)
POTASSIUM SERPL-SCNC: 4.5 MMOL/L (ref 3.5–5.1)
PROT SERPL-MCNC: 5.8 G/DL (ref 6–8.4)
PROTHROMBIN TIME: 12.3 SEC (ref 9–12.5)
RBC # BLD AUTO: 3.27 M/UL (ref 4.6–6.2)
SODIUM SERPL-SCNC: 133 MMOL/L (ref 136–145)
WBC # BLD AUTO: 19.87 K/UL (ref 3.9–12.7)

## 2022-09-11 PROCEDURE — 94761 N-INVAS EAR/PLS OXIMETRY MLT: CPT

## 2022-09-11 PROCEDURE — 84100 ASSAY OF PHOSPHORUS: CPT | Performed by: PHYSICIAN ASSISTANT

## 2022-09-11 PROCEDURE — 85730 THROMBOPLASTIN TIME PARTIAL: CPT | Mod: 91 | Performed by: THORACIC SURGERY (CARDIOTHORACIC VASCULAR SURGERY)

## 2022-09-11 PROCEDURE — 85730 THROMBOPLASTIN TIME PARTIAL: CPT

## 2022-09-11 PROCEDURE — 94664 DEMO&/EVAL PT USE INHALER: CPT

## 2022-09-11 PROCEDURE — 25000003 PHARM REV CODE 250

## 2022-09-11 PROCEDURE — 25000003 PHARM REV CODE 250: Performed by: STUDENT IN AN ORGANIZED HEALTH CARE EDUCATION/TRAINING PROGRAM

## 2022-09-11 PROCEDURE — 20000000 HC ICU ROOM

## 2022-09-11 PROCEDURE — 85025 COMPLETE CBC W/AUTO DIFF WBC: CPT | Performed by: PHYSICIAN ASSISTANT

## 2022-09-11 PROCEDURE — 99291 CRITICAL CARE FIRST HOUR: CPT | Mod: ,,, | Performed by: ANESTHESIOLOGY

## 2022-09-11 PROCEDURE — 85610 PROTHROMBIN TIME: CPT | Performed by: PHYSICIAN ASSISTANT

## 2022-09-11 PROCEDURE — 99291 PR CRITICAL CARE, E/M 30-74 MINUTES: ICD-10-PCS | Mod: ,,, | Performed by: ANESTHESIOLOGY

## 2022-09-11 PROCEDURE — 27000221 HC OXYGEN, UP TO 24 HOURS

## 2022-09-11 PROCEDURE — 27000646 HC AEROBIKA DEVICE

## 2022-09-11 PROCEDURE — 63600175 PHARM REV CODE 636 W HCPCS

## 2022-09-11 PROCEDURE — 80053 COMPREHEN METABOLIC PANEL: CPT | Performed by: STUDENT IN AN ORGANIZED HEALTH CARE EDUCATION/TRAINING PROGRAM

## 2022-09-11 PROCEDURE — 25000003 PHARM REV CODE 250: Performed by: PHYSICIAN ASSISTANT

## 2022-09-11 PROCEDURE — 83735 ASSAY OF MAGNESIUM: CPT | Performed by: PHYSICIAN ASSISTANT

## 2022-09-11 PROCEDURE — 99900035 HC TECH TIME PER 15 MIN (STAT)

## 2022-09-11 PROCEDURE — 27200667 HC PACEMAKER, TEMPORARY MONITORING, PER SHIFT

## 2022-09-11 PROCEDURE — 25000242 PHARM REV CODE 250 ALT 637 W/ HCPCS: Performed by: STUDENT IN AN ORGANIZED HEALTH CARE EDUCATION/TRAINING PROGRAM

## 2022-09-11 PROCEDURE — 84132 ASSAY OF SERUM POTASSIUM: CPT | Mod: 91 | Performed by: PHYSICIAN ASSISTANT

## 2022-09-11 RX ORDER — WARFARIN 4 MG/1
4 TABLET ORAL ONCE
Status: COMPLETED | OUTPATIENT
Start: 2022-09-11 | End: 2022-09-11

## 2022-09-11 RX ORDER — CARVEDILOL 6.25 MG/1
6.25 TABLET ORAL 2 TIMES DAILY
Status: DISCONTINUED | OUTPATIENT
Start: 2022-09-11 | End: 2022-09-16 | Stop reason: HOSPADM

## 2022-09-11 RX ADMIN — DOCUSATE SODIUM 100 MG: 100 CAPSULE ORAL at 08:09

## 2022-09-11 RX ADMIN — ACETAMINOPHEN 1000 MG: 500 TABLET ORAL at 09:09

## 2022-09-11 RX ADMIN — ASPIRIN 325 MG ORAL TABLET 325 MG: 325 PILL ORAL at 08:09

## 2022-09-11 RX ADMIN — OXYCODONE HYDROCHLORIDE 5 MG: 5 SOLUTION ORAL at 04:09

## 2022-09-11 RX ADMIN — HEPARIN SODIUM 16 UNITS/KG/HR: 5000 INJECTION INTRAVENOUS; SUBCUTANEOUS at 11:09

## 2022-09-11 RX ADMIN — HEPARIN SODIUM 14 UNITS/KG/HR: 5000 INJECTION INTRAVENOUS; SUBCUTANEOUS at 06:09

## 2022-09-11 RX ADMIN — CARVEDILOL 6.25 MG: 6.25 TABLET, FILM COATED ORAL at 08:09

## 2022-09-11 RX ADMIN — MUPIROCIN 1 G: 20 OINTMENT TOPICAL at 08:09

## 2022-09-11 RX ADMIN — WARFARIN SODIUM 4 MG: 4 TABLET ORAL at 05:09

## 2022-09-11 RX ADMIN — MILRINONE LACTATE IN DEXTROSE 0.25 MCG/KG/MIN: 200 INJECTION, SOLUTION INTRAVENOUS at 04:09

## 2022-09-11 RX ADMIN — OXYCODONE HYDROCHLORIDE 5 MG: 5 SOLUTION ORAL at 09:09

## 2022-09-11 RX ADMIN — OXYCODONE HYDROCHLORIDE 5 MG: 5 SOLUTION ORAL at 10:09

## 2022-09-11 RX ADMIN — ACETAMINOPHEN 1000 MG: 500 TABLET ORAL at 07:09

## 2022-09-11 RX ADMIN — POLYETHYLENE GLYCOL 3350 17 G: 17 POWDER, FOR SOLUTION ORAL at 08:09

## 2022-09-11 RX ADMIN — FUROSEMIDE 40 MG: 10 INJECTION, SOLUTION INTRAMUSCULAR; INTRAVENOUS at 08:09

## 2022-09-11 RX ADMIN — ACETAMINOPHEN 1000 MG: 500 TABLET ORAL at 02:09

## 2022-09-11 RX ADMIN — HEPARIN SODIUM 12 UNITS/KG/HR: 5000 INJECTION INTRAVENOUS; SUBCUTANEOUS at 04:09

## 2022-09-11 RX ADMIN — POTASSIUM & SODIUM PHOSPHATES POWDER PACK 280-160-250 MG 2 PACKET: 280-160-250 PACK at 01:09

## 2022-09-11 NOTE — NURSING
Dr Crouch, [facility], notified of 11 beat run of Vtach and labs sent to for electrolytes.  VSS.  Denies cp, palpitation, and sob.  Pt informed of vtach and labs being drawn.  Questions encouraged and answered.  External pacemaker connencted with back up rate of 45.  Refer to epic for assessments and updates.  Verification of map 85 or less parameters.

## 2022-09-11 NOTE — NURSING
Increased pt heparin gtt to 16 units and bolus 30 units per nomogram for a aPTT of 35.9. will recheck aPTT in 6 hours (18:00) until therapeutic.

## 2022-09-11 NOTE — PROGRESS NOTES
[facility] - [facility]  Critical Care - Surgery  Progress Note    Patient Name: Torsten Gordillo  MRN: [id number]  Admission Date: 9/8/2022  Hospital Length of Stay: 3 days  Code Status: Full Code  Attending Provider: Jadiel Andrew MD  Primary Care Provider: Marcin Farley MD   Principal Problem: S/P mitral valve replacement    Subjective:     Hospital/ICU Course:  No notes on file    Interval History/Significant Events:   No acute events overnight, hemodynamically stable   Milrinone weaned 2.25 yesterday, likely weaned today.  Step-down were placed      Follow-up For: Procedure(s) (LRB):  REPLACEMENT, MITRAL VALVE (N/A)  REPAIR, TRICUSPID VALVE, WITH RING INSERTION (N/A)    Post-Operative Day: 3 Days Post-Op    Objective:     Vital Signs (Most Recent):  Temp: 98.3 °F (36.8 °C) (09/11/22 0300)  Pulse: 68 (09/11/22 0415)  Resp: 20 (09/11/22 0415)  BP: 119/72 (09/11/22 0400)  SpO2: 98 % (09/11/22 0415) Vital Signs (24h Range):  Temp:  [98.3 °F (36.8 °C)-99 °F (37.2 °C)] 98.3 °F (36.8 °C)  Pulse:  [65-89] 68  Resp:  [6-30] 20  SpO2:  [95 %-100 %] 98 %  BP: (104-119)/(60-73) 119/72  Arterial Line BP: ()/(49-66) 112/55     Weight: 89.9 kg (198 lb 3.1 oz)  Body mass index is 27.64 kg/m².      Intake/Output Summary (Last 24 hours) at 9/11/2022 0558  Last data filed at 9/11/2022 0400  Gross per 24 hour   Intake 953.29 ml   Output 2280 ml   Net -1326.71 ml       Physical Exam  Vitals and nursing note reviewed.   Constitutional:       Appearance: Normal appearance.   HENT:      Head: Normocephalic and atraumatic.   Cardiovascular:      Rate and Rhythm: Normal rate and regular rhythm.      Pulses: Normal pulses.      Comments: Intrinsic rhythm 75  Midline cdi   Pulmonary:      Effort: Pulmonary effort is normal. No respiratory distress.   Abdominal:      General: Abdomen is flat. There is no distension.      Palpations: Abdomen is soft.   Skin:     General: Skin is warm and dry.   Neurological:       General: No focal deficit present.      Mental Status: He is alert and oriented to person, place, and time.   Psychiatric:         Mood and Affect: Mood normal.         Behavior: Behavior normal.         Lines/Drains/Airways       Drain  Duration                  Y Chest Tube 1 and 2 09/08/22 1209 1 Anterior Mediastinal 19 Fr. 2 Posterior Mediastinal 19 Fr. 2 days    Male External Urinary Catheter 09/10/22 1805 Medium <1 day              Line  Duration                  Pacer Wires 09/08/22 1206 2 days              Peripheral Intravenous Line  Duration                  Peripheral IV - Single Lumen 09/08/22 0549 18 G Anterior;Right Forearm 3 days         Peripheral IV - Single Lumen 09/10/22 1100 18 G Anterior;Left Forearm <1 day                    Significant Labs:    CBC/Anemia Profile:  Recent Labs   Lab 09/10/22  0230 09/11/22  0443   WBC 26.31*  26.31* 19.87*   HGB 11.4*  11.4* 11.0*   HCT 32.2*  32.2* 32.3*   PLT 97*  97* 110*   MCV 96  96 99*   RDW 14.1  14.1 14.0        Chemistries:  Recent Labs   Lab 09/10/22  0230 09/10/22  0750 09/10/22  1950 09/11/22  0443     --   --  133*   K 3.9 3.7 4.0 4.5     --   --  101   CO2 24  --   --  23   BUN 13  --   --  15   CREATININE 0.9  --   --  0.9   CALCIUM 8.1*  --   --  8.4*   ALBUMIN 2.9*  --   --  2.5*   PROT 5.2*  --   --  5.8*   BILITOT 1.8*  --   --  1.5*   ALKPHOS 68  --   --  75   ALT 13  --   --  11   AST 51*  --   --  34   MG 1.9  1.9  --  1.9 1.9   PHOS 2.4*  2.4*  --  2.3* 2.6*       All pertinent labs within the past 24 hours have been reviewed.    Significant Imaging:  I have reviewed all pertinent imaging results/findings within the past 24 hours.    Assessment/Plan:     * S/P mitral valve replacement    Neuro/Psych:   -- Sedation: None  -- Pain: PRN Oxy  -- Scheduled Tylenol             Cards:   -- S/P MVR (bio), TVr on 9/8/22  -- HDS on 0.25 milrinone, continue to wean today  -- Atrial and Ventricular pacing wires. Back up paced  45. Intrinsic rhythm 75  -- MAP  60-80, Syst < 140  -- Cleviprex PRN  -- Aspirin 325mg      Pulm:   -- Goal O2 sat > 90%  -- ABG PRN  -- Mediastinal chest tubes x2      Renal:  -- Keep uribe for strict I/O  -- Trend BUN/Cr 15/0.9 from 15/1.1      FEN / GI:   -- Replace lytes as needed  -- Nutrition: Advance as tolerated to Cardiac  -- GI ppx: not indicated  -- Bowel reg: miralax, docusate  -- Albumin PRN      ID:   -- Tm: afebrile; WBC stable  -- Mary-op ancef      Heme/Onc:   -- H/H stable 11  -- Daily CBC  -- 740 mL Cell saver given back  -- PTT 37; INR 1.2  -- Aspirin 325mg QD  -- Warfarin tonight per staff, started on heparin gtt goal ptt 60-80; Requires AC x 3 months      Endo:   -- BG goal 140-180  -- Insulin gtt  -- endocrinology consult      PPx:   Feeding: cardiac diet  Analgesia/Sedation: None / PRN Oxy; scheduled tylenol  Thromboembolic prevention: SCDs, Heparin gtt, warfarin   HOB >30: Yes  Stress Ulcer ppx: famotidine  Glucose control: Critical care goal 140-180 g/dl, ISS     Lines/Drains/Airway: CVC RIJ, Chest tubes x 3, atrial/ventricular pacing wires,     Dispo/Code Status/Palliative:   -- Step down from ICU/ Full Code.            Jeremy Delatorre MD  Critical Care - Surgery  [facility] - [facility]

## 2022-09-11 NOTE — SUBJECTIVE & OBJECTIVE
Interval History/Significant Events:   No acute events overnight, hemodynamically stable   Milrinone weaned 2.25 yesterday, likely weaned today.  Step-down were placed      Follow-up For: Procedure(s) (LRB):  REPLACEMENT, MITRAL VALVE (N/A)  REPAIR, TRICUSPID VALVE, WITH RING INSERTION (N/A)    Post-Operative Day: 3 Days Post-Op    Objective:     Vital Signs (Most Recent):  Temp: 98.3 °F (36.8 °C) (09/11/22 0300)  Pulse: 68 (09/11/22 0415)  Resp: 20 (09/11/22 0415)  BP: 119/72 (09/11/22 0400)  SpO2: 98 % (09/11/22 0415) Vital Signs (24h Range):  Temp:  [98.3 °F (36.8 °C)-99 °F (37.2 °C)] 98.3 °F (36.8 °C)  Pulse:  [65-89] 68  Resp:  [6-30] 20  SpO2:  [95 %-100 %] 98 %  BP: (104-119)/(60-73) 119/72  Arterial Line BP: ()/(49-66) 112/55     Weight: 89.9 kg (198 lb 3.1 oz)  Body mass index is 27.64 kg/m².      Intake/Output Summary (Last 24 hours) at 9/11/2022 0558  Last data filed at 9/11/2022 0400  Gross per 24 hour   Intake 953.29 ml   Output 2280 ml   Net -1326.71 ml       Physical Exam  Vitals and nursing note reviewed.   Constitutional:       Appearance: Normal appearance.   HENT:      Head: Normocephalic and atraumatic.   Cardiovascular:      Rate and Rhythm: Normal rate and regular rhythm.      Pulses: Normal pulses.      Comments: Intrinsic rhythm 75  Midline cdi   Pulmonary:      Effort: Pulmonary effort is normal. No respiratory distress.   Abdominal:      General: Abdomen is flat. There is no distension.      Palpations: Abdomen is soft.   Skin:     General: Skin is warm and dry.   Neurological:      General: No focal deficit present.      Mental Status: He is alert and oriented to person, place, and time.   Psychiatric:         Mood and Affect: Mood normal.         Behavior: Behavior normal.         Lines/Drains/Airways       Drain  Duration                  Y Chest Tube 1 and 2 09/08/22 1209 1 Anterior Mediastinal 19 Fr. 2 Posterior Mediastinal 19 Fr. 2 days    Male External Urinary Catheter 09/10/22  1805 Medium <1 day              Line  Duration                  Pacer Wires 09/08/22 1206 2 days              Peripheral Intravenous Line  Duration                  Peripheral IV - Single Lumen 09/08/22 0549 18 G Anterior;Right Forearm 3 days         Peripheral IV - Single Lumen 09/10/22 1100 18 G Anterior;Left Forearm <1 day                    Significant Labs:    CBC/Anemia Profile:  Recent Labs   Lab 09/10/22  0230 09/11/22  0443   WBC 26.31*  26.31* 19.87*   HGB 11.4*  11.4* 11.0*   HCT 32.2*  32.2* 32.3*   PLT 97*  97* 110*   MCV 96  96 99*   RDW 14.1  14.1 14.0        Chemistries:  Recent Labs   Lab 09/10/22  0230 09/10/22  0750 09/10/22  1950 09/11/22 0443     --   --  133*   K 3.9 3.7 4.0 4.5     --   --  101   CO2 24  --   --  23   BUN 13  --   --  15   CREATININE 0.9  --   --  0.9   CALCIUM 8.1*  --   --  8.4*   ALBUMIN 2.9*  --   --  2.5*   PROT 5.2*  --   --  5.8*   BILITOT 1.8*  --   --  1.5*   ALKPHOS 68  --   --  75   ALT 13  --   --  11   AST 51*  --   --  34   MG 1.9  1.9  --  1.9 1.9   PHOS 2.4*  2.4*  --  2.3* 2.6*       All pertinent labs within the past 24 hours have been reviewed.    Significant Imaging:  I have reviewed all pertinent imaging results/findings within the past 24 hours.

## 2022-09-11 NOTE — NURSING
Pt's spouse updated when she called in to check on .  Questions encouraged and answered.  Pt informed of wife updated and informed that she would be here to see him and stay overnight tomorrow

## 2022-09-11 NOTE — ASSESSMENT & PLAN NOTE
  Neuro/Psych:   -- Sedation: None  -- Pain: PRN Oxy  -- Scheduled Tylenol             Cards:   -- S/P MVR (bio), TVr on 9/8/22  -- HDS on 0.25 milrinone, continue to wean today  -- Atrial and Ventricular pacing wires. Back up paced 45. Intrinsic rhythm 75  -- MAP  60-80, Syst < 140  -- Cleviprex PRN  -- Aspirin 325mg      Pulm:   -- Goal O2 sat > 90%  -- ABG PRN  -- Mediastinal chest tubes x2      Renal:  -- Keep uribe for strict I/O  -- Trend BUN/Cr 15/0.9 from 15/1.1      FEN / GI:   -- Replace lytes as needed  -- Nutrition: Advance as tolerated to Cardiac  -- GI ppx: not indicated  -- Bowel reg: miralax, docusate  -- Albumin PRN      ID:   -- Tm: afebrile; WBC stable  -- Mary-op ancef      Heme/Onc:   -- H/H stable 11  -- Daily CBC  -- 740 mL Cell saver given back  -- PTT 37; INR 1.2  -- Aspirin 325mg QD  -- Warfarin tonight per staff, started on heparin gtt goal ptt 60-80; Requires AC x 3 months      Endo:   -- BG goal 140-180  -- Insulin gtt  -- endocrinology consult      PPx:   Feeding: cardiac diet  Analgesia/Sedation: None / PRN Oxy; scheduled tylenol  Thromboembolic prevention: SCDs, Heparin gtt, warfarin   HOB >30: Yes  Stress Ulcer ppx: famotidine  Glucose control: Critical care goal 140-180 g/dl, ISS     Lines/Drains/Airway: CVC RIJ, Chest tubes x 3, atrial/ventricular pacing wires,     Dispo/Code Status/Palliative:   -- Step down from ICU/ Full Code.

## 2022-09-11 NOTE — CARE UPDATE
BG goal 140-180  Diet clear liquid    Remains in SICU. POD 3. BG well controlled with no prn SQ insulin requirements. Endocrine to continue to follow; no changes to plan of care.       Plan:  -Continue Moderate Dose Correction Scale  -BG monitoring ac/hs    ** Please call Endocrine for any BG related issues **      Lab Results   Component Value Date    HGBA1C 5.7 (H) 09/06/2022       Will consider sign off once tolerating regular diet.

## 2022-09-12 LAB
ALBUMIN SERPL BCP-MCNC: 2.7 G/DL (ref 3.5–5.2)
ALP SERPL-CCNC: 86 U/L (ref 55–135)
ALT SERPL W/O P-5'-P-CCNC: 11 U/L (ref 10–44)
ANION GAP SERPL CALC-SCNC: 9 MMOL/L (ref 8–16)
APTT BLDCRRT: 42.7 SEC (ref 21–32)
APTT BLDCRRT: 59.7 SEC (ref 21–32)
AST SERPL-CCNC: 26 U/L (ref 10–40)
BASOPHILS # BLD AUTO: 0.02 K/UL (ref 0–0.2)
BASOPHILS NFR BLD: 0.1 % (ref 0–1.9)
BILIRUB SERPL-MCNC: 1.1 MG/DL (ref 0.1–1)
BLD PROD TYP BPU: NORMAL
BLD PROD TYP BPU: NORMAL
BLOOD UNIT EXPIRATION DATE: NORMAL
BLOOD UNIT EXPIRATION DATE: NORMAL
BLOOD UNIT TYPE CODE: 6200
BLOOD UNIT TYPE CODE: 6200
BLOOD UNIT TYPE: NORMAL
BLOOD UNIT TYPE: NORMAL
BUN SERPL-MCNC: 20 MG/DL (ref 8–23)
CALCIUM SERPL-MCNC: 8.5 MG/DL (ref 8.7–10.5)
CHLORIDE SERPL-SCNC: 99 MMOL/L (ref 95–110)
CO2 SERPL-SCNC: 28 MMOL/L (ref 23–29)
CODING SYSTEM: NORMAL
CODING SYSTEM: NORMAL
CREAT SERPL-MCNC: 1 MG/DL (ref 0.5–1.4)
DIFFERENTIAL METHOD: ABNORMAL
DISPENSE STATUS: NORMAL
DISPENSE STATUS: NORMAL
EOSINOPHIL # BLD AUTO: 0.1 K/UL (ref 0–0.5)
EOSINOPHIL NFR BLD: 0.8 % (ref 0–8)
ERYTHROCYTE [DISTWIDTH] IN BLOOD BY AUTOMATED COUNT: 13.7 % (ref 11.5–14.5)
EST. GFR  (NO RACE VARIABLE): >60 ML/MIN/1.73 M^2
GLUCOSE SERPL-MCNC: 104 MG/DL (ref 70–110)
HCT VFR BLD AUTO: 32.7 % (ref 40–54)
HGB BLD-MCNC: 11.1 G/DL (ref 14–18)
IMM GRANULOCYTES # BLD AUTO: 0.1 K/UL (ref 0–0.04)
IMM GRANULOCYTES NFR BLD AUTO: 0.7 % (ref 0–0.5)
INR PPP: 1.3 (ref 0.8–1.2)
LYMPHOCYTES # BLD AUTO: 1.7 K/UL (ref 1–4.8)
LYMPHOCYTES NFR BLD: 12.4 % (ref 18–48)
MAGNESIUM SERPL-MCNC: 2 MG/DL (ref 1.6–2.6)
MCH RBC QN AUTO: 33.1 PG (ref 27–31)
MCHC RBC AUTO-ENTMCNC: 33.9 G/DL (ref 32–36)
MCV RBC AUTO: 98 FL (ref 82–98)
MONOCYTES # BLD AUTO: 1.7 K/UL (ref 0.3–1)
MONOCYTES NFR BLD: 12.3 % (ref 4–15)
NEUTROPHILS # BLD AUTO: 10.3 K/UL (ref 1.8–7.7)
NEUTROPHILS NFR BLD: 73.7 % (ref 38–73)
NRBC BLD-RTO: 0 /100 WBC
NUM UNITS TRANS PACKED RBC: NORMAL
NUM UNITS TRANS PACKED RBC: NORMAL
PHOSPHATE SERPL-MCNC: 2.7 MG/DL (ref 2.7–4.5)
PLATELET # BLD AUTO: 170 K/UL (ref 150–450)
PMV BLD AUTO: 9.5 FL (ref 9.2–12.9)
POCT GLUCOSE: 113 MG/DL (ref 70–110)
POTASSIUM SERPL-SCNC: 3.6 MMOL/L (ref 3.5–5.1)
POTASSIUM SERPL-SCNC: 3.8 MMOL/L (ref 3.5–5.1)
POTASSIUM SERPL-SCNC: 4.2 MMOL/L (ref 3.5–5.1)
PROT SERPL-MCNC: 5.5 G/DL (ref 6–8.4)
PROTHROMBIN TIME: 13.6 SEC (ref 9–12.5)
RBC # BLD AUTO: 3.35 M/UL (ref 4.6–6.2)
SODIUM SERPL-SCNC: 136 MMOL/L (ref 136–145)
WBC # BLD AUTO: 13.95 K/UL (ref 3.9–12.7)

## 2022-09-12 PROCEDURE — 20000000 HC ICU ROOM

## 2022-09-12 PROCEDURE — 84132 ASSAY OF SERUM POTASSIUM: CPT | Performed by: PHYSICIAN ASSISTANT

## 2022-09-12 PROCEDURE — 84100 ASSAY OF PHOSPHORUS: CPT | Performed by: PHYSICIAN ASSISTANT

## 2022-09-12 PROCEDURE — 63600175 PHARM REV CODE 636 W HCPCS

## 2022-09-12 PROCEDURE — 25000003 PHARM REV CODE 250: Performed by: STUDENT IN AN ORGANIZED HEALTH CARE EDUCATION/TRAINING PROGRAM

## 2022-09-12 PROCEDURE — 85730 THROMBOPLASTIN TIME PARTIAL: CPT | Mod: 91 | Performed by: THORACIC SURGERY (CARDIOTHORACIC VASCULAR SURGERY)

## 2022-09-12 PROCEDURE — 25000003 PHARM REV CODE 250

## 2022-09-12 PROCEDURE — 25000003 PHARM REV CODE 250: Performed by: PHYSICIAN ASSISTANT

## 2022-09-12 PROCEDURE — 83735 ASSAY OF MAGNESIUM: CPT | Performed by: PHYSICIAN ASSISTANT

## 2022-09-12 PROCEDURE — 27000646 HC AEROBIKA DEVICE

## 2022-09-12 PROCEDURE — 94761 N-INVAS EAR/PLS OXIMETRY MLT: CPT

## 2022-09-12 PROCEDURE — 99222 1ST HOSP IP/OBS MODERATE 55: CPT | Mod: ,,, | Performed by: ANESTHESIOLOGY

## 2022-09-12 PROCEDURE — 85610 PROTHROMBIN TIME: CPT | Performed by: PHYSICIAN ASSISTANT

## 2022-09-12 PROCEDURE — 99900035 HC TECH TIME PER 15 MIN (STAT)

## 2022-09-12 PROCEDURE — 63600175 PHARM REV CODE 636 W HCPCS: Performed by: STUDENT IN AN ORGANIZED HEALTH CARE EDUCATION/TRAINING PROGRAM

## 2022-09-12 PROCEDURE — 25000242 PHARM REV CODE 250 ALT 637 W/ HCPCS: Performed by: STUDENT IN AN ORGANIZED HEALTH CARE EDUCATION/TRAINING PROGRAM

## 2022-09-12 PROCEDURE — 80053 COMPREHEN METABOLIC PANEL: CPT | Performed by: STUDENT IN AN ORGANIZED HEALTH CARE EDUCATION/TRAINING PROGRAM

## 2022-09-12 PROCEDURE — 63600175 PHARM REV CODE 636 W HCPCS: Performed by: PHYSICIAN ASSISTANT

## 2022-09-12 PROCEDURE — 85025 COMPLETE CBC W/AUTO DIFF WBC: CPT | Performed by: PHYSICIAN ASSISTANT

## 2022-09-12 PROCEDURE — 94664 DEMO&/EVAL PT USE INHALER: CPT

## 2022-09-12 PROCEDURE — 97116 GAIT TRAINING THERAPY: CPT

## 2022-09-12 PROCEDURE — 99222 PR INITIAL HOSPITAL CARE,LEVL II: ICD-10-PCS | Mod: ,,, | Performed by: ANESTHESIOLOGY

## 2022-09-12 PROCEDURE — 97535 SELF CARE MNGMENT TRAINING: CPT

## 2022-09-12 PROCEDURE — 85730 THROMBOPLASTIN TIME PARTIAL: CPT | Performed by: THORACIC SURGERY (CARDIOTHORACIC VASCULAR SURGERY)

## 2022-09-12 RX ORDER — ASPIRIN 81 MG/1
81 TABLET ORAL DAILY
Status: DISCONTINUED | OUTPATIENT
Start: 2022-09-13 | End: 2022-09-16 | Stop reason: HOSPADM

## 2022-09-12 RX ORDER — WARFARIN SODIUM 5 MG/1
5 TABLET ORAL DAILY
Status: COMPLETED | OUTPATIENT
Start: 2022-09-12 | End: 2022-09-12

## 2022-09-12 RX ORDER — MILRINONE LACTATE 0.2 MG/ML
0.12 INJECTION, SOLUTION INTRAVENOUS CONTINUOUS
Status: DISCONTINUED | OUTPATIENT
Start: 2022-09-12 | End: 2022-09-13

## 2022-09-12 RX ORDER — FINASTERIDE 5 MG/1
5 TABLET, FILM COATED ORAL DAILY
Status: DISCONTINUED | OUTPATIENT
Start: 2022-09-13 | End: 2022-09-16 | Stop reason: HOSPADM

## 2022-09-12 RX ORDER — ATORVASTATIN CALCIUM 40 MG/1
80 TABLET, FILM COATED ORAL DAILY
Status: DISCONTINUED | OUTPATIENT
Start: 2022-09-12 | End: 2022-09-16 | Stop reason: HOSPADM

## 2022-09-12 RX ADMIN — ONDANSETRON 4 MG: 2 INJECTION INTRAMUSCULAR; INTRAVENOUS at 11:09

## 2022-09-12 RX ADMIN — DOCUSATE SODIUM 100 MG: 100 CAPSULE ORAL at 08:09

## 2022-09-12 RX ADMIN — CARVEDILOL 6.25 MG: 6.25 TABLET, FILM COATED ORAL at 08:09

## 2022-09-12 RX ADMIN — FUROSEMIDE 40 MG: 10 INJECTION, SOLUTION INTRAMUSCULAR; INTRAVENOUS at 08:09

## 2022-09-12 RX ADMIN — ATORVASTATIN CALCIUM 80 MG: 40 TABLET, FILM COATED ORAL at 08:09

## 2022-09-12 RX ADMIN — OXYCODONE HYDROCHLORIDE 5 MG: 5 SOLUTION ORAL at 10:09

## 2022-09-12 RX ADMIN — MUPIROCIN 1 G: 20 OINTMENT TOPICAL at 08:09

## 2022-09-12 RX ADMIN — ACETAMINOPHEN 1000 MG: 500 TABLET ORAL at 06:09

## 2022-09-12 RX ADMIN — POLYETHYLENE GLYCOL 3350 17 G: 17 POWDER, FOR SOLUTION ORAL at 08:09

## 2022-09-12 RX ADMIN — MILRINONE LACTATE IN DEXTROSE 0.12 MCG/KG/MIN: 200 INJECTION, SOLUTION INTRAVENOUS at 07:09

## 2022-09-12 RX ADMIN — HEPARIN SODIUM 16 UNITS/KG/HR: 5000 INJECTION INTRAVENOUS; SUBCUTANEOUS at 08:09

## 2022-09-12 RX ADMIN — WARFARIN SODIUM 5 MG: 5 TABLET ORAL at 05:09

## 2022-09-12 RX ADMIN — OXYCODONE HYDROCHLORIDE 10 MG: 5 SOLUTION ORAL at 02:09

## 2022-09-12 RX ADMIN — ASPIRIN 325 MG ORAL TABLET 325 MG: 325 PILL ORAL at 08:09

## 2022-09-12 RX ADMIN — ACETAMINOPHEN 1000 MG: 500 TABLET ORAL at 10:09

## 2022-09-12 RX ADMIN — ACETAMINOPHEN 1000 MG: 500 TABLET ORAL at 02:09

## 2022-09-12 RX ADMIN — MILRINONE LACTATE IN DEXTROSE 0.12 MCG/KG/MIN: 200 INJECTION, SOLUTION INTRAVENOUS at 11:09

## 2022-09-12 RX ADMIN — POTASSIUM BICARBONATE 50 MEQ: 977.5 TABLET, EFFERVESCENT ORAL at 09:09

## 2022-09-12 NOTE — CARE UPDATE
BG goal 140-180  Diet Cardiac Fluid - 1500mL    Remains in SICU. POD 4. BG well controlled with no prn SQ insulin requirements. Tolerating cardiac diet.       Plan:  -Discontinue Moderate Dose Correction Scale and BG monitoring ac/hs  -Recommend continuing to monitor BG with am labs       Lab Results   Component Value Date    HGBA1C 5.7 (H) 09/06/2022       Endocrine to sign off at this time. Patient with no insulin needs while tolerating diet and no hx of DM. Please reconsult if needed.

## 2022-09-12 NOTE — PT/OT/SLP PROGRESS
Occupational Therapy   Co-Treatment with PT    Name: Torsten Gordillo  MRN: [id number]  Admitting Diagnosis:  S/P mitral valve replacement  4 Days Post-Op    Recommendations:     Discharge Recommendations: home  Discharge Equipment Recommendations:  none  Barriers to discharge:  None    Assessment:     Torsten Gordillo is a 62 y.o. male with a medical diagnosis of S/P mitral valve replacement.  He presents with good motivation and participation. Pt tolerated session well and is progressing towards OT goals.  Performance deficits affecting function are impaired endurance, impaired functional mobility, impaired self care skills, gait instability, impaired balance, orthopedic precautions. Pt would benefit from skilled OT services in order to maximize independence with ADLs and facilitate safe discharge. Anticipating pt will be able to return home with no additional OT needs when medically stable.      Rehab Prognosis:  Good; patient would benefit from acute skilled OT services to address these deficits and reach maximum level of function.       Plan:     Patient to be seen 5 x/week to address the above listed problems via self-care/home management, therapeutic activities, therapeutic exercises  Plan of Care Expires: 10/10/22  Plan of Care Reviewed with: patient, spouse    Subjective     Pain/Comfort:  Pain Rating 1: 0/10  Pain Rating Post-Intervention 1: 0/10    Objective:     Communicated with: RN and PT prior to session.  Patient found up in chair with arterial line, blood pressure cuff, peripheral IV, pulse ox (continuous), telemetry upon OT entry to room.    General Precautions: Standard, fall, sternal   Orthopedic Precautions:N/A   Braces: N/A  Respiratory Status: Room air     Occupational Performance:     Functional Mobility/Transfers:  Patient completed Sit <> Stand Transfer with supervision  with  no assistive device   2x from bedside chair  Functional Mobility: Pt ambulated bed>bathroom, seated rest, then within  halls with SBA and no AD in order to maximize functional activity tolerance and standing balance required for engagement in occupations of choice.    No LOB, SOB or c/o of dizziness    Activities of Daily Living:  Grooming: supervision to perform oral care, facial hygiene and apply deodorant standing at the sink  Lower Body Dressing: stand by assistance to don pajama pants       AMPAC 6 Click ADL: 21    Treatment & Education:  -Therapist provided facilitation and instruction of proper body mechanics, energy conservation, and fall prevention strategies during tasks listed above.  -Pt educated on role of OT, POC and goals for therapy  -Pt verbalized 3/3 sternal precautions with good adherence throughout  -Pt educated on importance of OOB activities with staff member assistance and sitting OOB majority of the day.   -Pt verbalized understanding. Pt expressed no further concerns/questions  -Whiteboard updated   -Co-tx with PT performed due to need for education and assistance from two skilled therapy disciplines at pt's current functional level within the ICU.    Patient left up in chair with all lines intact, call button in reach, RN notified, and spouse present    GOALS:   Multidisciplinary Problems       Occupational Therapy Goals          Problem: Occupational Therapy    Goal Priority Disciplines Outcome Interventions   Occupational Therapy Goal     OT, PT/OT Ongoing, Progressing    Description: Goals to be met by: 10/10/22     Patient will increase functional independence with ADLs by performing:    UE Dressing with Modified Stark.  LE Dressing with Modified Stark.  Grooming while standing at sink with Modified Stark.  Toileting from toilet with Modified Stark for hygiene and clothing management.   Toilet transfer to toilet with Modified Stark.                         Time Tracking:     OT Date of Treatment: 09/12/22  OT Start Time: 0912  OT Stop Time: 0933  OT Total Time (min):  21 min    Billable Minutes:Self Care/Home Management 21    OT/MICHAEL: OT          9/12/2022

## 2022-09-12 NOTE — ASSESSMENT & PLAN NOTE
  Neuro/Psych:   -- Sedation: None  -- Pain: PRN Oxy  -- Scheduled Tylenol             Cards:   -- S/P MVR (bio), TVr on 9/8/22  -- HDS on 0.175 milrinone, turn off today  -- Atrial and Ventricular pacing wires removed yesterday  -- MAP  60-80, Syst < 140  -- Aspirin 325mg  -- Coreg, Statin      Pulm:   -- Goal O2 sat > 90%; currently on room air  -- ABG PRN  -- Mediastinal chest tubes x2 removed yesterday      Renal:  -- Keep uribe for strict I/O  -- Trend BUN/Cr 20/1.0 from 15/0.9 from 15/1.1      FEN / GI:   -- Replace lytes as needed  -- Nutrition: Cardiac diet  -- GI ppx: not indicated  -- Bowel reg: miralax, docusate  -- Albumin PRN      ID:   -- Tm: afebrile; WBC stable  -- Mary-op ancef      Heme/Onc:   -- H/H stable 11.1  -- Daily CBC  -- 740 mL Cell saver given back  -- PTT 42.7; INR 1.3  -- Aspirin 325mg QD  -- Warfarin tonight per staff, given 4mg at 1700 yesterday, started on heparin gtt goal ptt 60-80; Requires AC x 3 months      Endo:   -- BG goal 140-180  -- Insulin gtt  -- endocrinology consult      PPx:   Feeding: cardiac diet  Analgesia/Sedation: None / PRN Oxy; scheduled tylenol  Thromboembolic prevention: SCDs, Heparin gtt, warfarin   HOB >30: Yes  Stress Ulcer ppx: famotidine  Glucose control: Critical care goal 140-180 g/dl, ISS     Lines/Drains/Airway: CVC RIJ, PIV     Dispo/Code Status/Palliative:   -- Step down from ICU/ Full Code.

## 2022-09-12 NOTE — PLAN OF CARE
Problem: Physical Therapy  Goal: Physical Therapy Goal  Description: Goals to be met by: 2022     Patient will increase functional independence with mobility by performin. Supine to sit with independence  2. Sit to supine with independence  3. Sit to stand transfer with independence  4. Bed to chair transfer with independence  5. Gait  x 200 feet with independence  6. Lower extremity exercise program x10 reps per handout, with independence  7. Patient will recall 3/3 sternal precautions without cuing   Outcome: Ongoing, Progressing   Goals remain appropriate. 2022

## 2022-09-12 NOTE — PLAN OF CARE
"      SICU PLAN OF CARE NOTE    Dx: S/P mitral valve replacement    Shift Events: NAEON. OOBTC this AM.    Neuro: AAOx4. Follows commands and moves all extremities purposefully.     Vital Signs: /67 (BP Location: Right arm, Patient Position: Lying)   Pulse 63   Temp 98 °F (36.7 °C) (Oral)   Resp 11   Ht 5' 11" (1.803 m)   Wt 89.9 kg (198 lb 3.1 oz)   SpO2 95%   BMI 27.64 kg/m²     Respiratory: Room Air    Gtts: Milrinone and Heparin    Urine Output: 800cc/shift     Labs/Accuchecks: Daily labs. Accuchecks ACHS.    Skin: No new skin breakdown noted. Repositioned independently. Waffle matttress inflated. Heels elevated. Bed plugged in and working properly. OOBTC.       "

## 2022-09-12 NOTE — PT/OT/SLP PROGRESS
Physical Therapy Co- Treatment with OT    Patient Name:  Torsten Gordillo   MRN:  [id number]    Recommendations:     Discharge Recommendations:  home   Discharge Equipment Recommendations: none   Barriers to discharge: None    Assessment:     Torsten Gordillo is a 62 y.o. male admitted with a medical diagnosis of S/P mitral valve replacement.  He presents with the following impairments/functional limitations:  gait instability, impaired functional mobility Pt tolerated treatment well today as he was able to walk 500ft with SBA. Pt will continue to benefit from skilled PT 5x/week in order to increase functional mobility.    Rehab Prognosis: Good; patient would benefit from acute skilled PT services to address these deficits and reach maximum level of function.    Recent Surgery: Procedure(s) (LRB):  REPLACEMENT, MITRAL VALVE (N/A)  REPAIR, TRICUSPID VALVE, WITH RING INSERTION (N/A) 4 Days Post-Op    Plan:     During this hospitalization, patient to be seen 5 x/week to address the identified rehab impairments via gait training, therapeutic activities and progress toward the following goals:    Plan of Care Expires:  10/07/22    Subjective     Chief Complaint: wanting to go home  Patient/Family Comments/goals: wanting to go home  Pain/Comfort:  Pain Rating 1: 0/10  Pain Rating Post-Intervention 1: 0/10      Objective:     Communicated with nurse prior to session.  Patient found up in chair with peripheral IV, telemetry, pulse ox (continuous), blood pressure cuff upon PT entry to room.     General Precautions: Standard, fall   Orthopedic Precautions:N/A   Braces:    Respiratory Status: Room air     Functional Mobility:  Transfers:   Pt was verbally cued on sternal precautions.  Sit to Stand:  supervision with no AD x 4 reps    Gait: Pt gait trained for 500ft in hallway with SBA, monitors and a mask on.     Balance: standing balance with supervision while doing ADLs with OT.    PT concentrated on standing balance and gait  training during session.        AM-PAC 6 CLICK MOBILITY  Turning over in bed (including adjusting bedclothes, sheets and blankets)?: 4  Sitting down on and standing up from a chair with arms (e.g., wheelchair, bedside commode, etc.): 4  Moving from lying on back to sitting on the side of the bed?: 4  Moving to and from a bed to a chair (including a wheelchair)?: 4  Need to walk in hospital room?: 4  Climbing 3-5 steps with a railing?: 3  Basic Mobility Total Score: 23       Therapeutic Activities and Exercises:   Pt and wife were verbally educated on POC and expressed verbal understanding.    Patient left up in chair with all lines intact, call button in reach, nurse notified, and wife present..    GOALS:   Multidisciplinary Problems       Physical Therapy Goals          Problem: Physical Therapy    Goal Priority Disciplines Outcome Goal Variances Interventions   Physical Therapy Goal     PT, PT/OT Ongoing, Progressing     Description: Goals to be met by: 2022     Patient will increase functional independence with mobility by performin. Supine to sit with independence  2. Sit to supine with independence  3. Sit to stand transfer with independence  4. Bed to chair transfer with independence  5. Gait  x 200 feet with independence  6. Lower extremity exercise program x10 reps per handout, with independence  7. Patient will recall 3/3 sternal precautions without cuing                        Time Tracking:     PT Received On: 22  PT Start Time: 912     PT Stop Time: 933  PT Total Time (min): 21 min     Billable Minutes: Gait Training 21 minutes       PT/PTA: PT     PTA Visit Number: 0     2022

## 2022-09-12 NOTE — SUBJECTIVE & OBJECTIVE
Interval History/Significant Events:   No acute issues overnight. Afebrile. VSS  Pain controlled well  no nausea / vomiting  (+) bowel function  Making adequate urine  Tube and wires pulled yesterday  Still awaiting step down      Follow-up For: Procedure(s) (LRB):  REPLACEMENT, MITRAL VALVE (N/A)  REPAIR, TRICUSPID VALVE, WITH RING INSERTION (N/A)    Post-Operative Day: 4 Days Post-Op    Objective:     Vital Signs (Most Recent):  Temp: 98 °F (36.7 °C) (09/11/22 2300)  Pulse: 62 (09/12/22 0530)  Resp: 14 (09/12/22 0530)  BP: 105/68 (09/12/22 0500)  SpO2: 98 % (09/12/22 0530) Vital Signs (24h Range):  Temp:  [98 °F (36.7 °C)-99 °F (37.2 °C)] 98 °F (36.7 °C)  Pulse:  [61-76] 62  Resp:  [8-31] 14  SpO2:  [93 %-100 %] 98 %  BP: ()/(55-72) 105/68     Weight: 89.9 kg (198 lb 3.1 oz)  Body mass index is 27.64 kg/m².      Intake/Output Summary (Last 24 hours) at 9/12/2022 0603  Last data filed at 9/12/2022 0200  Gross per 24 hour   Intake 1698.34 ml   Output 1675 ml   Net 23.34 ml       Physical Exam  Vitals and nursing note reviewed.   Constitutional:       Appearance: Normal appearance.   HENT:      Head: Normocephalic and atraumatic.   Cardiovascular:      Rate and Rhythm: Normal rate and regular rhythm.      Pulses: Normal pulses.      Comments: Intrinsic rhythm 75  Midline cdi   Pulmonary:      Effort: Pulmonary effort is normal. No respiratory distress.   Abdominal:      General: Abdomen is flat. There is no distension.      Palpations: Abdomen is soft.   Skin:     General: Skin is warm and dry.   Neurological:      General: No focal deficit present.      Mental Status: He is alert and oriented to person, place, and time.   Psychiatric:         Mood and Affect: Mood normal.         Behavior: Behavior normal.       Lines/Drains/Airways       Peripheral Intravenous Line  Duration                  Peripheral IV - Single Lumen 09/08/22 0549 18 G Anterior;Right Forearm 4 days         Peripheral IV - Single Lumen  09/10/22 1100 18 G Anterior;Left Forearm 1 day                    Significant Labs:    CBC/Anemia Profile:  Recent Labs   Lab 09/11/22 0443 09/12/22 0214   WBC 19.87* 13.95*   HGB 11.0* 11.1*   HCT 32.3* 32.7*   * 170   MCV 99* 98   RDW 14.0 13.7        Chemistries:  Recent Labs   Lab 09/10/22  1950 09/11/22 0443 09/11/22 0815 09/11/22 2040 09/12/22 0214   NA  --  133*  --   --  136   K 4.0 4.5 3.9 4.0 4.2   CL  --  101  --   --  99   CO2  --  23  --   --  28   BUN  --  15  --   --  20   CREATININE  --  0.9  --   --  1.0   CALCIUM  --  8.4*  --   --  8.5*   ALBUMIN  --  2.5*  --   --  2.7*   PROT  --  5.8*  --   --  5.5*   BILITOT  --  1.5*  --   --  1.1*   ALKPHOS  --  75  --   --  86   ALT  --  11  --   --  11   AST  --  34  --   --  26   MG 1.9 1.9  --   --  2.0   PHOS 2.3* 2.6*  --   --  2.7       All pertinent labs within the past 24 hours have been reviewed.    Significant Imaging:  I have reviewed all pertinent imaging results/findings within the past 24 hours.

## 2022-09-12 NOTE — PROGRESS NOTES
[facility]  Critical Care - Surgery  Progress Note    Patient Name: Torsten Gordillo  MRN: [id number]  Admission Date: 9/8/2022  Hospital Length of Stay: 4 days  Code Status: Full Code  Attending Provider: Jadiel Andrew MD  Primary Care Provider: Marcin Farley MD   Principal Problem: S/P mitral valve replacement    Subjective:     Hospital/ICU Course:  No notes on file    Interval History/Significant Events:   No acute issues overnight. Afebrile. VSS  Pain controlled well  no nausea / vomiting  (+) bowel function  Making adequate urine  Tube and wires pulled yesterday  Still awaiting step down      Follow-up For: Procedure(s) (LRB):  REPLACEMENT, MITRAL VALVE (N/A)  REPAIR, TRICUSPID VALVE, WITH RING INSERTION (N/A)    Post-Operative Day: 4 Days Post-Op    Objective:     Vital Signs (Most Recent):  Temp: 98 °F (36.7 °C) (09/11/22 2300)  Pulse: 62 (09/12/22 0530)  Resp: 14 (09/12/22 0530)  BP: 105/68 (09/12/22 0500)  SpO2: 98 % (09/12/22 0530) Vital Signs (24h Range):  Temp:  [98 °F (36.7 °C)-99 °F (37.2 °C)] 98 °F (36.7 °C)  Pulse:  [61-76] 62  Resp:  [8-31] 14  SpO2:  [93 %-100 %] 98 %  BP: ()/(55-72) 105/68     Weight: 89.9 kg (198 lb 3.1 oz)  Body mass index is 27.64 kg/m².      Intake/Output Summary (Last 24 hours) at 9/12/2022 0603  Last data filed at 9/12/2022 0200  Gross per 24 hour   Intake 1698.34 ml   Output 1675 ml   Net 23.34 ml       Physical Exam  Vitals and nursing note reviewed.   Constitutional:       Appearance: Normal appearance.   HENT:      Head: Normocephalic and atraumatic.   Cardiovascular:      Rate and Rhythm: Normal rate and regular rhythm.      Pulses: Normal pulses.      Comments: Intrinsic rhythm 75  Midline cdi   Pulmonary:      Effort: Pulmonary effort is normal. No respiratory distress.   Abdominal:      General: Abdomen is flat. There is no distension.      Palpations: Abdomen is soft.   Skin:     General: Skin is warm and dry.   Neurological:       General: No focal deficit present.      Mental Status: He is alert and oriented to person, place, and time.   Psychiatric:         Mood and Affect: Mood normal.         Behavior: Behavior normal.       Lines/Drains/Airways       Peripheral Intravenous Line  Duration                  Peripheral IV - Single Lumen 09/08/22 0549 18 G Anterior;Right Forearm 4 days         Peripheral IV - Single Lumen 09/10/22 1100 18 G Anterior;Left Forearm 1 day                    Significant Labs:    CBC/Anemia Profile:  Recent Labs   Lab 09/11/22 0443 09/12/22 0214   WBC 19.87* 13.95*   HGB 11.0* 11.1*   HCT 32.3* 32.7*   * 170   MCV 99* 98   RDW 14.0 13.7        Chemistries:  Recent Labs   Lab 09/10/22  1950 09/11/22 0443 09/11/22 0815 09/11/22 2040 09/12/22 0214   NA  --  133*  --   --  136   K 4.0 4.5 3.9 4.0 4.2   CL  --  101  --   --  99   CO2  --  23  --   --  28   BUN  --  15  --   --  20   CREATININE  --  0.9  --   --  1.0   CALCIUM  --  8.4*  --   --  8.5*   ALBUMIN  --  2.5*  --   --  2.7*   PROT  --  5.8*  --   --  5.5*   BILITOT  --  1.5*  --   --  1.1*   ALKPHOS  --  75  --   --  86   ALT  --  11  --   --  11   AST  --  34  --   --  26   MG 1.9 1.9  --   --  2.0   PHOS 2.3* 2.6*  --   --  2.7       All pertinent labs within the past 24 hours have been reviewed.    Significant Imaging:  I have reviewed all pertinent imaging results/findings within the past 24 hours.    Assessment/Plan:     * S/P mitral valve replacement    Neuro/Psych:   -- Sedation: None  -- Pain: PRN Oxy  -- Scheduled Tylenol             Cards:   -- S/P MVR (bio), TVr on 9/8/22  -- HDS on 0.175 milrinone, turn off today  -- Atrial and Ventricular pacing wires removed yesterday  -- MAP  60-80, Syst < 140  -- Aspirin 325mg  -- Coreg, Statin      Pulm:   -- Goal O2 sat > 90%; currently on room air  -- ABG PRN  -- Mediastinal chest tubes x2 removed yesterday      Renal:  -- Keep uribe for strict I/O  -- Trend BUN/Cr 20/1.0 from 15/0.9 from  15/1.1      FEN / GI:   -- Replace lytes as needed  -- Nutrition: Cardiac diet  -- GI ppx: not indicated  -- Bowel reg: miralax, docusate  -- Albumin PRN      ID:   -- Tm: afebrile; WBC stable  -- Mary-op ancef      Heme/Onc:   -- H/H stable 11.1  -- Daily CBC  -- 740 mL Cell saver given back  -- PTT 42.7; INR 1.3  -- Aspirin 325mg QD  -- Warfarin tonight per staff, given 4mg at 1700 yesterday, started on heparin gtt goal ptt 60-80; Requires AC x 3 months      Endo:   -- BG goal 140-180  -- Insulin gtt  -- endocrinology consult      PPx:   Feeding: cardiac diet  Analgesia/Sedation: None / PRN Oxy; scheduled tylenol  Thromboembolic prevention: SCDs, Heparin gtt, warfarin   HOB >30: Yes  Stress Ulcer ppx: famotidine  Glucose control: Critical care goal 140-180 g/dl, ISS     Lines/Drains/Airway: CVC RIJ, PIV     Dispo/Code Status/Palliative:   -- Step down from ICU/ Full Code.          Jeremy Delatorre MD  Critical Care - Surgery  [facility]

## 2022-09-13 ENCOUNTER — ANTI-COAG VISIT (OUTPATIENT)
Dept: CARDIOLOGY | Facility: CLINIC | Age: 62
End: 2022-09-13
Payer: COMMERCIAL

## 2022-09-13 DIAGNOSIS — Z95.2 S/P MITRAL VALVE REPLACEMENT: Primary | ICD-10-CM

## 2022-09-13 LAB
ALBUMIN SERPL BCP-MCNC: 2.5 G/DL (ref 3.5–5.2)
ALP SERPL-CCNC: 111 U/L (ref 55–135)
ALT SERPL W/O P-5'-P-CCNC: 19 U/L (ref 10–44)
ANION GAP SERPL CALC-SCNC: 8 MMOL/L (ref 8–16)
ANION GAP SERPL CALC-SCNC: 8 MMOL/L (ref 8–16)
APTT BLDCRRT: 101.1 SEC (ref 21–32)
APTT BLDCRRT: 48.8 SEC (ref 21–32)
ASCENDING AORTA: 3.39 CM
AST SERPL-CCNC: 28 U/L (ref 10–40)
AV INDEX (PROSTH): 0.73
AV MEAN GRADIENT: 7 MMHG
AV PEAK GRADIENT: 10 MMHG
AV VALVE AREA: 2.31 CM2
AV VELOCITY RATIO: 0.74
BASOPHILS # BLD AUTO: 0.02 K/UL (ref 0–0.2)
BASOPHILS NFR BLD: 0.2 % (ref 0–1.9)
BILIRUB SERPL-MCNC: 0.8 MG/DL (ref 0.1–1)
BSA FOR ECHO PROCEDURE: 2.12 M2
BUN SERPL-MCNC: 18 MG/DL (ref 8–23)
BUN SERPL-MCNC: 18 MG/DL (ref 8–23)
CALCIUM SERPL-MCNC: 8.7 MG/DL (ref 8.7–10.5)
CALCIUM SERPL-MCNC: 8.9 MG/DL (ref 8.7–10.5)
CHLORIDE SERPL-SCNC: 100 MMOL/L (ref 95–110)
CHLORIDE SERPL-SCNC: 100 MMOL/L (ref 95–110)
CO2 SERPL-SCNC: 25 MMOL/L (ref 23–29)
CO2 SERPL-SCNC: 27 MMOL/L (ref 23–29)
CREAT SERPL-MCNC: 0.8 MG/DL (ref 0.5–1.4)
CREAT SERPL-MCNC: 0.9 MG/DL (ref 0.5–1.4)
CV ECHO LV RWT: 0.52 CM
DIFFERENTIAL METHOD: ABNORMAL
DOP CALC AO PEAK VEL: 1.62 M/S
DOP CALC AO VTI: 24.21 CM
DOP CALC LVOT AREA: 3.2 CM2
DOP CALC LVOT DIAMETER: 2.01 CM
DOP CALC LVOT PEAK VEL: 1.2 M/S
DOP CALC LVOT STROKE VOLUME: 55.82 CM3
DOP CALC MV VTI: 50.21 CM
DOP CALCLVOT PEAK VEL VTI: 17.6 CM
E WAVE DECELERATION TIME: 583.19 MSEC
E/A RATIO: 1.45
E/E' RATIO: 22.5 M/S
ECHO LV POSTERIOR WALL: 1.35 CM (ref 0.6–1.1)
EJECTION FRACTION: 38 %
EOSINOPHIL # BLD AUTO: 0.1 K/UL (ref 0–0.5)
EOSINOPHIL NFR BLD: 1.2 % (ref 0–8)
ERYTHROCYTE [DISTWIDTH] IN BLOOD BY AUTOMATED COUNT: 13.5 % (ref 11.5–14.5)
EST. GFR  (NO RACE VARIABLE): >60 ML/MIN/1.73 M^2
EST. GFR  (NO RACE VARIABLE): >60 ML/MIN/1.73 M^2
FRACTIONAL SHORTENING: 7 % (ref 28–44)
GLUCOSE SERPL-MCNC: 105 MG/DL (ref 70–110)
GLUCOSE SERPL-MCNC: 135 MG/DL (ref 70–110)
HCT VFR BLD AUTO: 29.5 % (ref 40–54)
HGB BLD-MCNC: 10 G/DL (ref 14–18)
IMM GRANULOCYTES # BLD AUTO: 0.05 K/UL (ref 0–0.04)
IMM GRANULOCYTES NFR BLD AUTO: 0.5 % (ref 0–0.5)
INR PPP: 1.7 (ref 0.8–1.2)
INTERVENTRICULAR SEPTUM: 1.17 CM (ref 0.6–1.1)
LA MAJOR: 6.55 CM
LA MINOR: 7.15 CM
LA WIDTH: 4.43 CM
LEFT ATRIUM SIZE: 3.26 CM
LEFT ATRIUM VOLUME INDEX: 40 ML/M2
LEFT ATRIUM VOLUME: 83.93 CM3
LEFT INTERNAL DIMENSION IN SYSTOLE: 4.79 CM (ref 2.1–4)
LEFT VENTRICLE DIASTOLIC VOLUME INDEX: 60.91 ML/M2
LEFT VENTRICLE DIASTOLIC VOLUME: 127.91 ML
LEFT VENTRICLE MASS INDEX: 126 G/M2
LEFT VENTRICLE SYSTOLIC VOLUME INDEX: 50.9 ML/M2
LEFT VENTRICLE SYSTOLIC VOLUME: 106.88 ML
LEFT VENTRICULAR INTERNAL DIMENSION IN DIASTOLE: 5.17 CM (ref 3.5–6)
LEFT VENTRICULAR MASS: 263.98 G
LV LATERAL E/E' RATIO: 15 M/S
LV SEPTAL E/E' RATIO: 45 M/S
LYMPHOCYTES # BLD AUTO: 1.3 K/UL (ref 1–4.8)
LYMPHOCYTES NFR BLD: 13.1 % (ref 18–48)
MAGNESIUM SERPL-MCNC: 2 MG/DL (ref 1.6–2.6)
MAGNESIUM SERPL-MCNC: 2.2 MG/DL (ref 1.6–2.6)
MCH RBC QN AUTO: 33 PG (ref 27–31)
MCHC RBC AUTO-ENTMCNC: 33.9 G/DL (ref 32–36)
MCV RBC AUTO: 97 FL (ref 82–98)
MONOCYTES # BLD AUTO: 1.6 K/UL (ref 0.3–1)
MONOCYTES NFR BLD: 16.1 % (ref 4–15)
MV MEAN GRADIENT: 2 MMHG
MV PEAK A VEL: 0.93 M/S
MV PEAK E VEL: 1.35 M/S
MV PEAK GRADIENT: 8 MMHG
MV STENOSIS PRESSURE HALF TIME: 169.13 MS
MV VALVE AREA BY CONTINUITY EQUATION: 1.11 CM2
MV VALVE AREA P 1/2 METHOD: 1.3 CM2
NEUTROPHILS # BLD AUTO: 6.9 K/UL (ref 1.8–7.7)
NEUTROPHILS NFR BLD: 68.9 % (ref 38–73)
NRBC BLD-RTO: 0 /100 WBC
PHOSPHATE SERPL-MCNC: 2.7 MG/DL (ref 2.7–4.5)
PHOSPHATE SERPL-MCNC: 2.7 MG/DL (ref 2.7–4.5)
PLATELET # BLD AUTO: 197 K/UL (ref 150–450)
PMV BLD AUTO: 9 FL (ref 9.2–12.9)
POTASSIUM SERPL-SCNC: 3.6 MMOL/L (ref 3.5–5.1)
POTASSIUM SERPL-SCNC: 3.8 MMOL/L (ref 3.5–5.1)
POTASSIUM SERPL-SCNC: 4.3 MMOL/L (ref 3.5–5.1)
POTASSIUM SERPL-SCNC: 4.5 MMOL/L (ref 3.5–5.1)
PROT SERPL-MCNC: 5.9 G/DL (ref 6–8.4)
PROTHROMBIN TIME: 17 SEC (ref 9–12.5)
RA MAJOR: 5.76 CM
RA PRESSURE: 3 MMHG
RA WIDTH: 3.54 CM
RBC # BLD AUTO: 3.03 M/UL (ref 4.6–6.2)
RIGHT VENTRICULAR END-DIASTOLIC DIMENSION: 3.28 CM
SINUS: 3.43 CM
SODIUM SERPL-SCNC: 133 MMOL/L (ref 136–145)
SODIUM SERPL-SCNC: 135 MMOL/L (ref 136–145)
STJ: 3.55 CM
TDI LATERAL: 0.09 M/S
TDI SEPTAL: 0.03 M/S
TDI: 0.06 M/S
TRICUSPID ANNULAR PLANE SYSTOLIC EXCURSION: 1.34 CM
WBC # BLD AUTO: 10.03 K/UL (ref 3.9–12.7)

## 2022-09-13 PROCEDURE — 25000003 PHARM REV CODE 250: Performed by: PHYSICIAN ASSISTANT

## 2022-09-13 PROCEDURE — 84132 ASSAY OF SERUM POTASSIUM: CPT | Performed by: PHYSICIAN ASSISTANT

## 2022-09-13 PROCEDURE — 36415 COLL VENOUS BLD VENIPUNCTURE: CPT | Performed by: PHYSICIAN ASSISTANT

## 2022-09-13 PROCEDURE — 25000003 PHARM REV CODE 250

## 2022-09-13 PROCEDURE — 80053 COMPREHEN METABOLIC PANEL: CPT | Performed by: STUDENT IN AN ORGANIZED HEALTH CARE EDUCATION/TRAINING PROGRAM

## 2022-09-13 PROCEDURE — 63600175 PHARM REV CODE 636 W HCPCS

## 2022-09-13 PROCEDURE — 83735 ASSAY OF MAGNESIUM: CPT | Mod: 91 | Performed by: THORACIC SURGERY (CARDIOTHORACIC VASCULAR SURGERY)

## 2022-09-13 PROCEDURE — 20600001 HC STEP DOWN PRIVATE ROOM

## 2022-09-13 PROCEDURE — 99291 CRITICAL CARE FIRST HOUR: CPT | Mod: ,,, | Performed by: ANESTHESIOLOGY

## 2022-09-13 PROCEDURE — 80048 BASIC METABOLIC PNL TOTAL CA: CPT | Mod: XB | Performed by: THORACIC SURGERY (CARDIOTHORACIC VASCULAR SURGERY)

## 2022-09-13 PROCEDURE — 25000242 PHARM REV CODE 250 ALT 637 W/ HCPCS: Performed by: STUDENT IN AN ORGANIZED HEALTH CARE EDUCATION/TRAINING PROGRAM

## 2022-09-13 PROCEDURE — 84100 ASSAY OF PHOSPHORUS: CPT | Mod: 91 | Performed by: THORACIC SURGERY (CARDIOTHORACIC VASCULAR SURGERY)

## 2022-09-13 PROCEDURE — 94664 DEMO&/EVAL PT USE INHALER: CPT

## 2022-09-13 PROCEDURE — 99900035 HC TECH TIME PER 15 MIN (STAT)

## 2022-09-13 PROCEDURE — 94799 UNLISTED PULMONARY SVC/PX: CPT

## 2022-09-13 PROCEDURE — 83735 ASSAY OF MAGNESIUM: CPT | Performed by: PHYSICIAN ASSISTANT

## 2022-09-13 PROCEDURE — 84100 ASSAY OF PHOSPHORUS: CPT | Performed by: PHYSICIAN ASSISTANT

## 2022-09-13 PROCEDURE — 85610 PROTHROMBIN TIME: CPT | Performed by: PHYSICIAN ASSISTANT

## 2022-09-13 PROCEDURE — 94761 N-INVAS EAR/PLS OXIMETRY MLT: CPT

## 2022-09-13 PROCEDURE — 85730 THROMBOPLASTIN TIME PARTIAL: CPT | Performed by: THORACIC SURGERY (CARDIOTHORACIC VASCULAR SURGERY)

## 2022-09-13 PROCEDURE — 36415 COLL VENOUS BLD VENIPUNCTURE: CPT | Performed by: THORACIC SURGERY (CARDIOTHORACIC VASCULAR SURGERY)

## 2022-09-13 PROCEDURE — 85025 COMPLETE CBC W/AUTO DIFF WBC: CPT | Performed by: PHYSICIAN ASSISTANT

## 2022-09-13 PROCEDURE — 99291 PR CRITICAL CARE, E/M 30-74 MINUTES: ICD-10-PCS | Mod: ,,, | Performed by: ANESTHESIOLOGY

## 2022-09-13 RX ORDER — WARFARIN SODIUM 5 MG/1
5 TABLET ORAL DAILY
Status: COMPLETED | OUTPATIENT
Start: 2022-09-13 | End: 2022-09-13

## 2022-09-13 RX ADMIN — FUROSEMIDE 40 MG: 10 INJECTION, SOLUTION INTRAMUSCULAR; INTRAVENOUS at 08:09

## 2022-09-13 RX ADMIN — HEPARIN SODIUM 16 UNITS/KG/HR: 5000 INJECTION INTRAVENOUS; SUBCUTANEOUS at 05:09

## 2022-09-13 RX ADMIN — OXYCODONE HYDROCHLORIDE 5 MG: 5 SOLUTION ORAL at 06:09

## 2022-09-13 RX ADMIN — ASPIRIN 81 MG: 81 TABLET, COATED ORAL at 08:09

## 2022-09-13 RX ADMIN — CARVEDILOL 6.25 MG: 6.25 TABLET, FILM COATED ORAL at 09:09

## 2022-09-13 RX ADMIN — ATORVASTATIN CALCIUM 80 MG: 40 TABLET, FILM COATED ORAL at 08:09

## 2022-09-13 RX ADMIN — DOCUSATE SODIUM 100 MG: 100 CAPSULE ORAL at 08:09

## 2022-09-13 RX ADMIN — OXYCODONE HYDROCHLORIDE 5 MG: 5 SOLUTION ORAL at 02:09

## 2022-09-13 RX ADMIN — ACETAMINOPHEN 1000 MG: 500 TABLET ORAL at 02:09

## 2022-09-13 RX ADMIN — MUPIROCIN 1 G: 20 OINTMENT TOPICAL at 08:09

## 2022-09-13 RX ADMIN — FINASTERIDE 5 MG: 5 TABLET, FILM COATED ORAL at 08:09

## 2022-09-13 RX ADMIN — CARVEDILOL 6.25 MG: 6.25 TABLET, FILM COATED ORAL at 08:09

## 2022-09-13 RX ADMIN — WARFARIN SODIUM 5 MG: 5 TABLET ORAL at 05:09

## 2022-09-13 RX ADMIN — POTASSIUM BICARBONATE 50 MEQ: 977.5 TABLET, EFFERVESCENT ORAL at 05:09

## 2022-09-13 RX ADMIN — ACETAMINOPHEN 1000 MG: 500 TABLET ORAL at 05:09

## 2022-09-13 RX ADMIN — DOCUSATE SODIUM 100 MG: 100 CAPSULE ORAL at 09:09

## 2022-09-13 RX ADMIN — FUROSEMIDE 40 MG: 10 INJECTION, SOLUTION INTRAMUSCULAR; INTRAVENOUS at 09:09

## 2022-09-13 RX ADMIN — POLYETHYLENE GLYCOL 3350 17 G: 17 POWDER, FOR SOLUTION ORAL at 08:09

## 2022-09-13 NOTE — PLAN OF CARE
CM met with the patient and his wife to discuss post discharge wife and patient request Home Health follow up  CM will inform the team

## 2022-09-13 NOTE — PLAN OF CARE
"      SICU PLAN OF CARE NOTE    Dx: S/P mitral valve replacement    Shift Events: NAEON    Neuro: AAOx4. Follows commands and moves all extremities purposefully.    Vital Signs: /72 (BP Location: Right arm, Patient Position: Lying)   Pulse (!) 57   Temp 98.1 °F (36.7 °C) (Oral)   Resp 17   Ht 5' 11" (1.803 m)   Wt 89.9 kg (198 lb 3.1 oz)   SpO2 99%   BMI 27.64 kg/m²     Respiratory: Room air    Gtts: Heparin    Urine Output: 1L/shift     Labs/Accuchecks: Daily labs. Electrolytes replaced as ordered.    Skin: No new skin breakdown noted. Repositioned independently. Waffle mattress inflated. Bed plugged in and working properly.       "

## 2022-09-13 NOTE — ASSESSMENT & PLAN NOTE
  Neuro/Psych:   -- Sedation: None  -- Pain: PRN Oxy  -- Scheduled Tylenol             Cards:   -- S/P MVR (bio), TVr on 9/8/22  -- HDS without inotropic support  -- MAP  60-80, Syst < 140  -- Aspirin 325mg  -- Coreg, Statin      Pulm:   -- Goal O2 sat > 90%; currently on room air      Renal:  -- Keep uribe for strict I/O  -- Trend BUN/Cr 18/0.8 from 20/1.0      FEN / GI:   -- Replace lytes as needed  -- Nutrition: Cardiac diet  -- GI ppx: not indicated  -- Bowel reg: miralax, docusate  -- Albumin PRN      ID:   -- Tm: afebrile; WBC stable  -- Mary-op ancef  Recent Labs   Lab 09/11/22 0443 09/12/22 0214 09/13/22  0309   WBC 19.87* 13.95* 10.03          Heme/Onc:   -- H/H stable 10  -- Daily CBC  -- 740 mL Cell saver given back  -- PTT 48.8; INR 1.7  -- Aspirin 325mg QD  -- Warfarin tonight per staff, started on heparin gtt goal ptt 60-80; Requires AC x 3 months    Recent Labs   Lab 09/11/22 0443 09/12/22 0214 09/13/22  0309   HGB 11.0* 11.1* 10.0*     Recent Labs   Lab 09/11/22  0443 09/12/22  0214 09/13/22  0309   INR 1.2 1.3* 1.7*             Endo:   -- BG goal 140-180  -- Insulin gtt  -- endocrinology consult      PPx:   Feeding: cardiac diet  Analgesia/Sedation: None / PRN Oxy; scheduled tylenol  Thromboembolic prevention: SCDs, Heparin gtt, warfarin   HOB >30: Yes  Stress Ulcer ppx: famotidine  Glucose control: Critical care goal 140-180 g/dl, ISS     Lines/Drains/Airway: CVC RIJ, PIV     Dispo/Code Status/Palliative:   -- Step down from ICU/ Full Code.

## 2022-09-13 NOTE — PROGRESS NOTES
09/13/22 1603   Vital Signs   Temp 98.8 °F (37.1 °C)   Temp src Oral   Pulse (!) 58   Heart Rate Source Monitor   Resp 16   SpO2 95 %   Pulse Oximetry Type Intermittent   O2 Device (Oxygen Therapy) room air   /65   MAP (mmHg) 80   BP Location Right arm   BP Method Automatic   Patient Position Lying   Pt had 9 beats of VTACH at 1553 and 8 beats of vtach at 1555 in the monitor.Pt asymptomatic.Missael Caballero MD,CTS notified.Stat BMP,Mg and phosphorous ordered as requested.

## 2022-09-13 NOTE — PLAN OF CARE
[facility]  Discharge Final Note    Primary Care Provider: Marcin Farley MD    Expected Discharge Date:     Final Discharge Note (most recent)       Final Note - 09/13/22 0623          Final Note    Assessment Type Discharge Planning Reassessment                     Important Message from Medicare

## 2022-09-13 NOTE — PLAN OF CARE
Problem: Adult Inpatient Plan of Care  Goal: Plan of Care Review  Outcome: Ongoing, Progressing  Goal: Patient-Specific Goal (Individualized)  Outcome: Ongoing, Progressing  Flowsheets (Taken 9/13/2022 1630)  Anxieties, Fears or Concerns: concerned about his d/c home  Individualized Care Needs: Monitor VS,heparin gtts,pain management  Patient-Specific Goals (Include Timeframe): Pt will have pain managed through the shift  Goal: Absence of Hospital-Acquired Illness or Injury  Outcome: Ongoing, Progressing  Goal: Optimal Comfort and Wellbeing  Outcome: Ongoing, Progressing  Goal: Readiness for Transition of Care  Outcome: Ongoing, Progressing     Problem: Diabetes Comorbidity  Goal: Blood Glucose Level Within Targeted Range  Outcome: Ongoing, Progressing     Problem: Activity Intolerance (Cardiovascular Surgery)  Goal: Improved Activity Tolerance  Outcome: Ongoing, Progressing     Problem: Adjustment to Surgery (Cardiovascular Surgery)  Goal: Optimal Coping with Heart Surgery  Outcome: Ongoing, Progressing     Problem: Bleeding (Cardiovascular Surgery)  Goal: Bleeding (Cardiovascular Surgery)  Outcome: Ongoing, Progressing     Problem: Bowel Motility Impaired (Cardiovascular Surgery)  Goal: Effective Bowel Elimination (Cardiovascular Surgery)  Outcome: Ongoing, Progressing     Problem: Cardiac Function Impaired (Cardiovascular Surgery)  Goal: Effective Cardiac Function  Outcome: Ongoing, Progressing     Problem: Cerebral Tissue Perfusion (Cardiovascular Surgery)  Goal: Optimal Cerebral Tissue Perfusion (Cardiovascular Surgery)  Outcome: Ongoing, Progressing     Problem: Fluid and Electrolyte Imbalance (Cardiovascular Surgery)  Goal: Fluid and Electrolyte Balance (Cardiovascular Surgery)  Outcome: Ongoing, Progressing     Problem: Glycemic Control Impaired (Cardiovascular Surgery)  Goal: Blood Glucose Level Within Targeted Range (Cardiovascular Surgery)  Outcome: Ongoing, Progressing     Problem: Infection  (Cardiovascular Surgery)  Goal: Absence of Infection Signs and Symptoms  Outcome: Ongoing, Progressing     Problem: Ongoing Anesthesia Effects (Cardiovascular Surgery)  Goal: Anesthesia/Sedation Recovery  Outcome: Ongoing, Progressing     Problem: Pain (Cardiovascular Surgery)  Goal: Acceptable Pain Control  Outcome: Ongoing, Progressing     Problem: Postoperative Nausea and Vomiting (Cardiovascular Surgery)  Goal: Nausea and Vomiting Relief (Cardiovascular Surgery)  Outcome: Ongoing, Progressing     Problem: Postoperative Urinary Retention (Cardiovascular Surgery)  Goal: Effective Urinary Elimination (Cardiovascular Surgery)  Outcome: Ongoing, Progressing     Problem: Infection  Goal: Absence of Infection Signs and Symptoms  Outcome: Ongoing, Progressing     Problem: Nutrition Impairment (Mechanical Ventilation, Invasive)  Goal: Optimal Nutrition Delivery  Outcome: Ongoing, Progressing     Problem: Skin and Tissue Injury (Mechanical Ventilation, Invasive)  Goal: Absence of Device-Related Skin and Tissue Injury  Outcome: Ongoing, Progressing     Problem: Skin and Tissue Injury (Artificial Airway)  Goal: Absence of Device-Related Skin or Tissue Injury  Outcome: Ongoing, Progressing     Problem: Fall Injury Risk  Goal: Absence of Fall and Fall-Related Injury  Outcome: Ongoing, Progressing    AAOX4,VSS,O2 sats>94% on RA.Plan of care discussed with patient and family.Patient has no complaints of pain/SOB.No falls/injuries through the shift,fall precautions in place.Discussed medications and care. Patient has no questions at this time.Heparin gtts infusing at 16 units/kgs/hr,13 ml/hr.Pt visualised and stable.Call light within reach.Pt resting,bed at lowest position.Pt's family by the bedside.

## 2022-09-13 NOTE — NURSING TRANSFER
Nursing Transfer Note      9/13/2022     Reason patient is being transferred: stable    Transfer To: 326    Transfer via wheelchair    Transfer with cardiac monitoring    Transported by RN    Medicines sent: N/A    Any special needs or follow-up needed: N/A    Chart send with patient: Yes    Notified: spouse    Patient reassessed at: 14:54 9/13/22    Upon arrival to floor: cardiac monitor applied, patient oriented to room, call bell in reach, and bed in lowest position.Pt stable no signs of distress.     Handoff heparin at 16 units.   Report called to AARON Riggs.

## 2022-09-13 NOTE — PLAN OF CARE
09/13/22 1406   Post-Acute Status   Post-Acute Authorization Home Health   Home Health Status Set-up Complete/Auth obtained     The patient has been set up with [facility].     Chaka Orellana LMSW  Case Management [facility]  Ext: [phone]

## 2022-09-13 NOTE — SUBJECTIVE & OBJECTIVE
Interval History/Significant Events:   No acute issues overnight. Afebrile. VSS  Pain controlled well  no nausea / vomiting  (+) bowel function  Making adequate urine  Milrinone weaned off yesterday, waiting on stepdown      Follow-up For: Procedure(s) (LRB):  REPLACEMENT, MITRAL VALVE (N/A)  REPAIR, TRICUSPID VALVE, WITH RING INSERTION (N/A)    Post-Operative Day: 5 Days Post-Op    Objective:     Vital Signs (Most Recent):  Temp: 98.1 °F (36.7 °C) (09/13/22 0300)  Pulse: (!) 57 (09/13/22 0600)  Resp: 17 (09/13/22 0600)  BP: 111/67 (09/13/22 0600)  SpO2: 99 % (09/13/22 0600)   Vital Signs (24h Range):  Temp:  [98 °F (36.7 °C)-98.6 °F (37 °C)] 98.1 °F (36.7 °C)  Pulse:  [56-68] 57  Resp:  [10-32] 17  SpO2:  [94 %-100 %] 99 %  BP: ()/(50-74) 111/67     Weight: 89.9 kg (198 lb 3.1 oz)  Body mass index is 27.64 kg/m².      Intake/Output Summary (Last 24 hours) at 9/13/2022 0617  Last data filed at 9/13/2022 0600  Gross per 24 hour   Intake 1868.24 ml   Output 2300 ml   Net -431.76 ml       Physical Exam  Vitals and nursing note reviewed.   Constitutional:       Appearance: Normal appearance.   HENT:      Head: Normocephalic and atraumatic.   Cardiovascular:      Rate and Rhythm: Normal rate and regular rhythm.      Pulses: Normal pulses.      Comments: Intrinsic rhythm 75  Midline cdi   Pulmonary:      Effort: Pulmonary effort is normal. No respiratory distress.   Abdominal:      General: Abdomen is flat. There is no distension.      Palpations: Abdomen is soft.   Skin:     General: Skin is warm and dry.   Neurological:      General: No focal deficit present.      Mental Status: He is alert and oriented to person, place, and time.   Psychiatric:         Mood and Affect: Mood normal.         Behavior: Behavior normal.         Lines/Drains/Airways       Peripheral Intravenous Line  Duration                  Peripheral IV - Single Lumen 09/08/22 0549 18 G Anterior;Right Forearm 5 days         Peripheral IV - Single  Lumen 09/10/22 1100 18 G Anterior;Left Forearm 2 days                    Significant Labs:    CBC/Anemia Profile:  Recent Labs   Lab 09/12/22 0214 09/13/22  0309   WBC 13.95* 10.03   HGB 11.1* 10.0*   HCT 32.7* 29.5*    197   MCV 98 97   RDW 13.7 13.5        Chemistries:  Recent Labs   Lab 09/12/22 0214 09/12/22 0819 09/12/22 2026 09/13/22  0309     --   --  133*   K 4.2 3.6 3.8 3.6   CL 99  --   --  100   CO2 28  --   --  25   BUN 20  --   --  18   CREATININE 1.0  --   --  0.8   CALCIUM 8.5*  --   --  8.7   ALBUMIN 2.7*  --   --  2.5*   PROT 5.5*  --   --  5.9*   BILITOT 1.1*  --   --  0.8   ALKPHOS 86  --   --  111   ALT 11  --   --  19   AST 26  --   --  28   MG 2.0  --   --  2.0   PHOS 2.7  --   --  2.7       All pertinent labs within the past 24 hours have been reviewed.    Significant Imaging:  I have reviewed all pertinent imaging results/findings within the past 24 hours.

## 2022-09-13 NOTE — PROGRESS NOTES
09/13/22 1517   Vital Signs   Temp 97.1 °F (36.2 °C)   Temp src Oral   Pulse 60   Heart Rate Source Monitor   Resp 18   BP (!) 97/51   MAP (mmHg) 69   BP Location Right arm   BP Method Automatic   Patient Position Sitting   Assessments (Pre/Post)   Level of Consciousness (AVPU) alert     Pt arrived from SICU in wheelchair w/ wife at bedside. Heparin infusing at 13 ml.hr -ordered at 16unit/kg/hr. Pt voice no complaints upon arrivng to rm 326.  1 red luggage at bedside. Pt is currently having bedside echo done.  Pt was given a heart pillow.

## 2022-09-13 NOTE — PLAN OF CARE
09/13/22 1207   Post-Acute Status   Post-Acute Authorization Home Health   Home Health Status Referrals Sent       The SW faxed a home health referral via [facility] to [facility] for review.     The SW will continue to follow.     Chaka Orellana LMSW  Case Management [facility]  Ext: [phone]

## 2022-09-14 LAB
ALBUMIN SERPL BCP-MCNC: 2.7 G/DL (ref 3.5–5.2)
ALP SERPL-CCNC: 150 U/L (ref 55–135)
ALT SERPL W/O P-5'-P-CCNC: 27 U/L (ref 10–44)
ANION GAP SERPL CALC-SCNC: 8 MMOL/L (ref 8–16)
APTT BLDCRRT: 56.6 SEC (ref 21–32)
APTT BLDCRRT: 56.7 SEC (ref 21–32)
AST SERPL-CCNC: 31 U/L (ref 10–40)
BASOPHILS # BLD AUTO: 0.02 K/UL (ref 0–0.2)
BASOPHILS NFR BLD: 0.2 % (ref 0–1.9)
BILIRUB SERPL-MCNC: 0.8 MG/DL (ref 0.1–1)
BUN SERPL-MCNC: 14 MG/DL (ref 8–23)
CALCIUM SERPL-MCNC: 9.3 MG/DL (ref 8.7–10.5)
CHLORIDE SERPL-SCNC: 101 MMOL/L (ref 95–110)
CO2 SERPL-SCNC: 27 MMOL/L (ref 23–29)
CREAT SERPL-MCNC: 1 MG/DL (ref 0.5–1.4)
DIFFERENTIAL METHOD: ABNORMAL
EOSINOPHIL # BLD AUTO: 0.1 K/UL (ref 0–0.5)
EOSINOPHIL NFR BLD: 0.9 % (ref 0–8)
ERYTHROCYTE [DISTWIDTH] IN BLOOD BY AUTOMATED COUNT: 13.3 % (ref 11.5–14.5)
EST. GFR  (NO RACE VARIABLE): >60 ML/MIN/1.73 M^2
GLUCOSE SERPL-MCNC: 107 MG/DL (ref 70–110)
HCT VFR BLD AUTO: 32 % (ref 40–54)
HGB BLD-MCNC: 10.7 G/DL (ref 14–18)
IMM GRANULOCYTES # BLD AUTO: 0.06 K/UL (ref 0–0.04)
IMM GRANULOCYTES NFR BLD AUTO: 0.6 % (ref 0–0.5)
INR PPP: 1.7 (ref 0.8–1.2)
LYMPHOCYTES # BLD AUTO: 1.9 K/UL (ref 1–4.8)
LYMPHOCYTES NFR BLD: 19.5 % (ref 18–48)
MAGNESIUM SERPL-MCNC: 2.1 MG/DL (ref 1.6–2.6)
MCH RBC QN AUTO: 32.7 PG (ref 27–31)
MCHC RBC AUTO-ENTMCNC: 33.4 G/DL (ref 32–36)
MCV RBC AUTO: 98 FL (ref 82–98)
MONOCYTES # BLD AUTO: 1.8 K/UL (ref 0.3–1)
MONOCYTES NFR BLD: 17.8 % (ref 4–15)
NEUTROPHILS # BLD AUTO: 6 K/UL (ref 1.8–7.7)
NEUTROPHILS NFR BLD: 61 % (ref 38–73)
NRBC BLD-RTO: 0 /100 WBC
PHOSPHATE SERPL-MCNC: 2.9 MG/DL (ref 2.7–4.5)
PLATELET # BLD AUTO: 301 K/UL (ref 150–450)
PMV BLD AUTO: 9.8 FL (ref 9.2–12.9)
POTASSIUM SERPL-SCNC: 4.4 MMOL/L (ref 3.5–5.1)
PROT SERPL-MCNC: 6.5 G/DL (ref 6–8.4)
PROTHROMBIN TIME: 17.1 SEC (ref 9–12.5)
RBC # BLD AUTO: 3.27 M/UL (ref 4.6–6.2)
SODIUM SERPL-SCNC: 136 MMOL/L (ref 136–145)
WBC # BLD AUTO: 9.9 K/UL (ref 3.9–12.7)

## 2022-09-14 PROCEDURE — 25000003 PHARM REV CODE 250

## 2022-09-14 PROCEDURE — 84100 ASSAY OF PHOSPHORUS: CPT | Performed by: PHYSICIAN ASSISTANT

## 2022-09-14 PROCEDURE — 85025 COMPLETE CBC W/AUTO DIFF WBC: CPT | Performed by: PHYSICIAN ASSISTANT

## 2022-09-14 PROCEDURE — 83735 ASSAY OF MAGNESIUM: CPT | Performed by: PHYSICIAN ASSISTANT

## 2022-09-14 PROCEDURE — 85730 THROMBOPLASTIN TIME PARTIAL: CPT | Performed by: THORACIC SURGERY (CARDIOTHORACIC VASCULAR SURGERY)

## 2022-09-14 PROCEDURE — 36415 COLL VENOUS BLD VENIPUNCTURE: CPT | Performed by: PHYSICIAN ASSISTANT

## 2022-09-14 PROCEDURE — 63600175 PHARM REV CODE 636 W HCPCS

## 2022-09-14 PROCEDURE — 63600175 PHARM REV CODE 636 W HCPCS: Performed by: PHYSICIAN ASSISTANT

## 2022-09-14 PROCEDURE — 36415 COLL VENOUS BLD VENIPUNCTURE: CPT | Performed by: THORACIC SURGERY (CARDIOTHORACIC VASCULAR SURGERY)

## 2022-09-14 PROCEDURE — 25000003 PHARM REV CODE 250: Performed by: PHYSICIAN ASSISTANT

## 2022-09-14 PROCEDURE — 80053 COMPREHEN METABOLIC PANEL: CPT | Performed by: STUDENT IN AN ORGANIZED HEALTH CARE EDUCATION/TRAINING PROGRAM

## 2022-09-14 PROCEDURE — 85730 THROMBOPLASTIN TIME PARTIAL: CPT | Mod: 91 | Performed by: PHYSICIAN ASSISTANT

## 2022-09-14 PROCEDURE — 20600001 HC STEP DOWN PRIVATE ROOM

## 2022-09-14 PROCEDURE — 85610 PROTHROMBIN TIME: CPT | Performed by: PHYSICIAN ASSISTANT

## 2022-09-14 RX ORDER — WARFARIN 7.5 MG/1
7.5 TABLET ORAL DAILY
Status: DISCONTINUED | OUTPATIENT
Start: 2022-09-14 | End: 2022-09-15

## 2022-09-14 RX ADMIN — ACETAMINOPHEN 1000 MG: 500 TABLET ORAL at 05:09

## 2022-09-14 RX ADMIN — HEPARIN SODIUM 17 UNITS/KG/HR: 5000 INJECTION INTRAVENOUS; SUBCUTANEOUS at 01:09

## 2022-09-14 RX ADMIN — FUROSEMIDE 40 MG: 10 INJECTION, SOLUTION INTRAMUSCULAR; INTRAVENOUS at 09:09

## 2022-09-14 RX ADMIN — CARVEDILOL 6.25 MG: 6.25 TABLET, FILM COATED ORAL at 09:09

## 2022-09-14 RX ADMIN — DOCUSATE SODIUM 100 MG: 100 CAPSULE ORAL at 09:09

## 2022-09-14 RX ADMIN — WARFARIN SODIUM 7.5 MG: 7.5 TABLET ORAL at 04:09

## 2022-09-14 RX ADMIN — ACETAMINOPHEN 1000 MG: 500 TABLET ORAL at 09:09

## 2022-09-14 RX ADMIN — ASPIRIN 81 MG: 81 TABLET, COATED ORAL at 09:09

## 2022-09-14 RX ADMIN — ATORVASTATIN CALCIUM 80 MG: 40 TABLET, FILM COATED ORAL at 09:09

## 2022-09-14 RX ADMIN — POLYETHYLENE GLYCOL 3350 17 G: 17 POWDER, FOR SOLUTION ORAL at 09:09

## 2022-09-14 RX ADMIN — ACETAMINOPHEN 1000 MG: 500 TABLET ORAL at 01:09

## 2022-09-14 RX ADMIN — FINASTERIDE 5 MG: 5 TABLET, FILM COATED ORAL at 09:09

## 2022-09-14 NOTE — PLAN OF CARE
Calm and cooperative, no signs of acute distress. Supported with active listening and plan discussed. Patient remained free of infection, injury and falls. Continue heparin gtt, rate changes ptt therapeutic. Comfort and safety maintained. Continue to monitor.   Problem: Adult Inpatient Plan of Care  Goal: Plan of Care Review  Outcome: Ongoing, Progressing  Goal: Patient-Specific Goal (Individualized)  Outcome: Ongoing, Progressing  Goal: Absence of Hospital-Acquired Illness or Injury  Outcome: Ongoing, Progressing  Goal: Optimal Comfort and Wellbeing  Outcome: Ongoing, Progressing  Goal: Readiness for Transition of Care  Outcome: Ongoing, Progressing     Problem: Diabetes Comorbidity  Goal: Blood Glucose Level Within Targeted Range  Outcome: Ongoing, Progressing     Problem: Activity Intolerance (Cardiovascular Surgery)  Goal: Improved Activity Tolerance  Outcome: Ongoing, Progressing     Problem: Adjustment to Surgery (Cardiovascular Surgery)  Goal: Optimal Coping with Heart Surgery  Outcome: Ongoing, Progressing     Problem: Bleeding (Cardiovascular Surgery)  Goal: Bleeding (Cardiovascular Surgery)  Outcome: Ongoing, Progressing     Problem: Bowel Motility Impaired (Cardiovascular Surgery)  Goal: Effective Bowel Elimination (Cardiovascular Surgery)  Outcome: Ongoing, Progressing     Problem: Cardiac Function Impaired (Cardiovascular Surgery)  Goal: Effective Cardiac Function  Outcome: Ongoing, Progressing     Problem: Cerebral Tissue Perfusion (Cardiovascular Surgery)  Goal: Optimal Cerebral Tissue Perfusion (Cardiovascular Surgery)  Outcome: Ongoing, Progressing     Problem: Fluid and Electrolyte Imbalance (Cardiovascular Surgery)  Goal: Fluid and Electrolyte Balance (Cardiovascular Surgery)  Outcome: Ongoing, Progressing     Problem: Infection (Cardiovascular Surgery)  Goal: Absence of Infection Signs and Symptoms  Outcome: Ongoing, Progressing     Problem: Pain (Cardiovascular Surgery)  Goal: Acceptable Pain  Control  Outcome: Ongoing, Progressing     Problem: Infection  Goal: Absence of Infection Signs and Symptoms  Outcome: Ongoing, Progressing

## 2022-09-14 NOTE — SUBJECTIVE & OBJECTIVE
Interval History: NAEON. Awaiting therapeutic INR. Goal 2.5-3.5. Ambulating without difficulty. No complaints this AM.     Review of Systems   Constitutional: Negative for malaise/fatigue.   Cardiovascular:  Negative for chest pain and dyspnea on exertion.   Respiratory:  Negative for shortness of breath.    Gastrointestinal:  Negative for abdominal pain.   Genitourinary:  Negative for dysuria.   Neurological:  Negative for weakness.   Medications:  Continuous Infusions:   heparin (porcine) in D5W 16 Units/kg/hr (09/13/22 1707)     Scheduled Meds:   acetaminophen  1,000 mg Oral Q8H    aspirin  81 mg Oral Daily    atorvastatin  80 mg Oral Daily    carvediloL  6.25 mg Oral BID    docusate sodium  100 mg Oral BID    finasteride  5 mg Oral Daily    furosemide (LASIX) injection  40 mg Intravenous BID    polyethylene glycol  17 g Oral Daily    warfarin  7.5 mg Oral Daily     PRN Meds:bisacodyL, dextrose 10%, dextrose 10%, heparin (PORCINE), heparin (PORCINE), metoclopramide HCl, ondansetron, oxyCODONE, oxyCODONE, sodium chloride 0.9%     Objective:     Vital Signs (Most Recent):  Temp: 98.3 °F (36.8 °C) (09/14/22 0820)  Pulse: (!) 59 (09/14/22 0820)  Resp: 18 (09/14/22 0820)  BP: 120/75 (09/14/22 0820)  SpO2: (!) 93 % (09/14/22 0820)   Vital Signs (24h Range):  Temp:  [97.1 °F (36.2 °C)-98.8 °F (37.1 °C)] 98.3 °F (36.8 °C)  Pulse:  [57-62] 59  Resp:  [16-22] 18  SpO2:  [93 %-100 %] 93 %  BP: ()/(51-75) 120/75     Weight: 89.4 kg (197 lb 1.5 oz)  Body mass index is 27.49 kg/m².    SpO2: (!) 93 %  O2 Device (Oxygen Therapy): room air    Intake/Output - Last 3 Shifts         09/12 0700 09/13 0659 09/13 0700 09/14 0659 09/14 0700  09/15 0659    P.O. 1178 940     I.V. (mL/kg) 690.2 (7.7) 103.8 (1.2)     IV Piggyback       Total Intake(mL/kg) 1868.2 (20.8) 1043.8 (11.6)     Urine (mL/kg/hr) 2300 (1.1) 2100 (1)     Total Output 2300 2100     Net -431.8 -1056.2                    Lines/Drains/Airways       Peripheral  Intravenous Line  Duration                  Peripheral IV - Single Lumen 09/08/22 0549 18 G Anterior;Right Forearm 6 days         Peripheral IV - Single Lumen 09/10/22 1100 18 G Anterior;Left Forearm 3 days                    Physical Exam  Constitutional:       General: He is not in acute distress.  HENT:      Head: Normocephalic and atraumatic.   Cardiovascular:      Rate and Rhythm: Normal rate and regular rhythm.   Pulmonary:      Effort: Pulmonary effort is normal. No respiratory distress.   Abdominal:      General: There is no distension.   Musculoskeletal:         General: No swelling. Normal range of motion.      Cervical back: Normal range of motion.   Skin:     Coloration: Skin is not pale.      Comments: Midline sternal incision c/d/I  Chest tube sites healing well    Neurological:      Mental Status: He is alert. Mental status is at baseline.   Psychiatric:         Mood and Affect: Mood normal.         Behavior: Behavior normal.       Significant Labs:  BMP:   Recent Labs   Lab 09/14/22  0632         K 4.4      CO2 27   BUN 14   CREATININE 1.0   CALCIUM 9.3   MG 2.1     Cardiac markers: No results for input(s): CKMB, CPKMB, TROPONINT, TROPONINI, MYOGLOBIN in the last 48 hours.  CBC:   Recent Labs   Lab 09/14/22  0632   WBC 9.90   RBC 3.27*   HGB 10.7*   HCT 32.0*      MCV 98   MCH 32.7*   MCHC 33.4     CMP:   Recent Labs   Lab 09/14/22  0632      CALCIUM 9.3   ALBUMIN 2.7*   PROT 6.5      K 4.4   CO2 27      BUN 14   CREATININE 1.0   ALKPHOS 150*   ALT 27   AST 31   BILITOT 0.8     Coagulation:   Recent Labs   Lab 09/14/22  0632   INR 1.7*   APTT 56.6*       Significant Diagnostics:  I have reviewed all pertinent imaging results/findings within the past 24 hours.

## 2022-09-14 NOTE — ASSESSMENT & PLAN NOTE
- Bioprosthetic. Coumadin for 3 months. INR goal 2.5-3.5  - ASA 81  - Lasix and K   - Coreg   - Scheduled Tylenol   - Statin   - Bowel regimen   - INR 1.7. Will give coumadin 7.5 tonight (4,5,5)   - PTT goal 60-80. Heparin gtt increased from 16units/kg/hr to 17  - PT/OT

## 2022-09-15 LAB
ANION GAP SERPL CALC-SCNC: 9 MMOL/L (ref 8–16)
APTT BLDCRRT: 43.4 SEC (ref 21–32)
BASOPHILS # BLD AUTO: 0.03 K/UL (ref 0–0.2)
BASOPHILS NFR BLD: 0.3 % (ref 0–1.9)
BUN SERPL-MCNC: 15 MG/DL (ref 8–23)
CALCIUM SERPL-MCNC: 9.4 MG/DL (ref 8.7–10.5)
CHLORIDE SERPL-SCNC: 103 MMOL/L (ref 95–110)
CO2 SERPL-SCNC: 26 MMOL/L (ref 23–29)
CREAT SERPL-MCNC: 0.9 MG/DL (ref 0.5–1.4)
DIFFERENTIAL METHOD: ABNORMAL
EOSINOPHIL # BLD AUTO: 0.1 K/UL (ref 0–0.5)
EOSINOPHIL NFR BLD: 1.3 % (ref 0–8)
ERYTHROCYTE [DISTWIDTH] IN BLOOD BY AUTOMATED COUNT: 13.5 % (ref 11.5–14.5)
EST. GFR  (NO RACE VARIABLE): >60 ML/MIN/1.73 M^2
GLUCOSE SERPL-MCNC: 110 MG/DL (ref 70–110)
HCT VFR BLD AUTO: 31.4 % (ref 40–54)
HGB BLD-MCNC: 11 G/DL (ref 14–18)
IMM GRANULOCYTES # BLD AUTO: 0.11 K/UL (ref 0–0.04)
IMM GRANULOCYTES NFR BLD AUTO: 1.1 % (ref 0–0.5)
INR PPP: 2.1 (ref 0.8–1.2)
LYMPHOCYTES # BLD AUTO: 2 K/UL (ref 1–4.8)
LYMPHOCYTES NFR BLD: 19.2 % (ref 18–48)
MAGNESIUM SERPL-MCNC: 2.1 MG/DL (ref 1.6–2.6)
MCH RBC QN AUTO: 33.3 PG (ref 27–31)
MCHC RBC AUTO-ENTMCNC: 35 G/DL (ref 32–36)
MCV RBC AUTO: 95 FL (ref 82–98)
MONOCYTES # BLD AUTO: 1.7 K/UL (ref 0.3–1)
MONOCYTES NFR BLD: 16.1 % (ref 4–15)
NEUTROPHILS # BLD AUTO: 6.3 K/UL (ref 1.8–7.7)
NEUTROPHILS NFR BLD: 62 % (ref 38–73)
NRBC BLD-RTO: 0 /100 WBC
PHOSPHATE SERPL-MCNC: 3.3 MG/DL (ref 2.7–4.5)
PLATELET # BLD AUTO: 371 K/UL (ref 150–450)
PMV BLD AUTO: 9.3 FL (ref 9.2–12.9)
POCT GLUCOSE: 151 MG/DL (ref 70–110)
POCT GLUCOSE: 153 MG/DL (ref 70–110)
POCT GLUCOSE: 156 MG/DL (ref 70–110)
POCT GLUCOSE: 158 MG/DL (ref 70–110)
POCT GLUCOSE: 159 MG/DL (ref 70–110)
POCT GLUCOSE: 160 MG/DL (ref 70–110)
POCT GLUCOSE: 169 MG/DL (ref 70–110)
POCT GLUCOSE: 174 MG/DL (ref 70–110)
POCT GLUCOSE: 174 MG/DL (ref 70–110)
POCT GLUCOSE: 192 MG/DL (ref 70–110)
POCT GLUCOSE: 74 MG/DL (ref 70–110)
POTASSIUM SERPL-SCNC: 4.3 MMOL/L (ref 3.5–5.1)
PROTHROMBIN TIME: 20.9 SEC (ref 9–12.5)
RBC # BLD AUTO: 3.3 M/UL (ref 4.6–6.2)
SODIUM SERPL-SCNC: 138 MMOL/L (ref 136–145)
WBC # BLD AUTO: 10.22 K/UL (ref 3.9–12.7)

## 2022-09-15 PROCEDURE — 84100 ASSAY OF PHOSPHORUS: CPT | Performed by: PHYSICIAN ASSISTANT

## 2022-09-15 PROCEDURE — 20600001 HC STEP DOWN PRIVATE ROOM

## 2022-09-15 PROCEDURE — 25000003 PHARM REV CODE 250: Performed by: PHYSICIAN ASSISTANT

## 2022-09-15 PROCEDURE — 94799 UNLISTED PULMONARY SVC/PX: CPT

## 2022-09-15 PROCEDURE — 85610 PROTHROMBIN TIME: CPT | Performed by: PHYSICIAN ASSISTANT

## 2022-09-15 PROCEDURE — 99900035 HC TECH TIME PER 15 MIN (STAT)

## 2022-09-15 PROCEDURE — 25000003 PHARM REV CODE 250

## 2022-09-15 PROCEDURE — 63600175 PHARM REV CODE 636 W HCPCS: Performed by: PHYSICIAN ASSISTANT

## 2022-09-15 PROCEDURE — 36415 COLL VENOUS BLD VENIPUNCTURE: CPT | Performed by: PHYSICIAN ASSISTANT

## 2022-09-15 PROCEDURE — 85025 COMPLETE CBC W/AUTO DIFF WBC: CPT | Performed by: PHYSICIAN ASSISTANT

## 2022-09-15 PROCEDURE — 27000646 HC AEROBIKA DEVICE

## 2022-09-15 PROCEDURE — 83735 ASSAY OF MAGNESIUM: CPT | Performed by: PHYSICIAN ASSISTANT

## 2022-09-15 PROCEDURE — 94664 DEMO&/EVAL PT USE INHALER: CPT

## 2022-09-15 PROCEDURE — 85730 THROMBOPLASTIN TIME PARTIAL: CPT | Performed by: THORACIC SURGERY (CARDIOTHORACIC VASCULAR SURGERY)

## 2022-09-15 PROCEDURE — 94761 N-INVAS EAR/PLS OXIMETRY MLT: CPT

## 2022-09-15 PROCEDURE — 63600175 PHARM REV CODE 636 W HCPCS

## 2022-09-15 PROCEDURE — 80048 BASIC METABOLIC PNL TOTAL CA: CPT | Performed by: PHYSICIAN ASSISTANT

## 2022-09-15 RX ORDER — DOCUSATE SODIUM 100 MG/1
200 CAPSULE, LIQUID FILLED ORAL 2 TIMES DAILY
Status: DISCONTINUED | OUTPATIENT
Start: 2022-09-15 | End: 2022-09-16 | Stop reason: HOSPADM

## 2022-09-15 RX ORDER — WARFARIN SODIUM 5 MG/1
5 TABLET ORAL DAILY
Status: DISCONTINUED | OUTPATIENT
Start: 2022-09-15 | End: 2022-09-15

## 2022-09-15 RX ADMIN — ASPIRIN 81 MG: 81 TABLET, COATED ORAL at 09:09

## 2022-09-15 RX ADMIN — DOCUSATE SODIUM 200 MG: 100 CAPSULE ORAL at 08:09

## 2022-09-15 RX ADMIN — DOCUSATE SODIUM 100 MG: 100 CAPSULE ORAL at 09:09

## 2022-09-15 RX ADMIN — CARVEDILOL 6.25 MG: 6.25 TABLET, FILM COATED ORAL at 08:09

## 2022-09-15 RX ADMIN — CARVEDILOL 6.25 MG: 6.25 TABLET, FILM COATED ORAL at 09:09

## 2022-09-15 RX ADMIN — HEPARIN SODIUM 18 UNITS/KG/HR: 5000 INJECTION INTRAVENOUS; SUBCUTANEOUS at 09:09

## 2022-09-15 RX ADMIN — FUROSEMIDE 40 MG: 10 INJECTION, SOLUTION INTRAMUSCULAR; INTRAVENOUS at 08:09

## 2022-09-15 RX ADMIN — FINASTERIDE 5 MG: 5 TABLET, FILM COATED ORAL at 09:09

## 2022-09-15 RX ADMIN — ACETAMINOPHEN 1000 MG: 500 TABLET ORAL at 01:09

## 2022-09-15 RX ADMIN — POLYETHYLENE GLYCOL 3350 17 G: 17 POWDER, FOR SOLUTION ORAL at 09:09

## 2022-09-15 RX ADMIN — ATORVASTATIN CALCIUM 80 MG: 40 TABLET, FILM COATED ORAL at 09:09

## 2022-09-15 RX ADMIN — FUROSEMIDE 40 MG: 10 INJECTION, SOLUTION INTRAMUSCULAR; INTRAVENOUS at 09:09

## 2022-09-15 RX ADMIN — ACETAMINOPHEN 1000 MG: 500 TABLET ORAL at 09:09

## 2022-09-15 RX ADMIN — ACETAMINOPHEN 1000 MG: 500 TABLET ORAL at 05:09

## 2022-09-15 NOTE — PLAN OF CARE
Problem: Adult Inpatient Plan of Care  Goal: Plan of Care Review  Outcome: Ongoing, Progressing  Goal: Patient-Specific Goal (Individualized)  Outcome: Ongoing, Progressing  Goal: Absence of Hospital-Acquired Illness or Injury  Outcome: Ongoing, Progressing  Goal: Optimal Comfort and Wellbeing  Outcome: Ongoing, Progressing  Goal: Readiness for Transition of Care  Outcome: Ongoing, Progressing     Problem: Diabetes Comorbidity  Goal: Blood Glucose Level Within Targeted Range  Outcome: Ongoing, Progressing     Problem: Activity Intolerance (Cardiovascular Surgery)  Goal: Improved Activity Tolerance  Outcome: Ongoing, Progressing     Problem: Adjustment to Surgery (Cardiovascular Surgery)  Goal: Optimal Coping with Heart Surgery  Outcome: Ongoing, Progressing     Problem: Bleeding (Cardiovascular Surgery)  Goal: Bleeding (Cardiovascular Surgery)  Outcome: Ongoing, Progressing     Problem: Bowel Motility Impaired (Cardiovascular Surgery)  Goal: Effective Bowel Elimination (Cardiovascular Surgery)  Outcome: Ongoing, Progressing     Problem: Cardiac Function Impaired (Cardiovascular Surgery)  Goal: Effective Cardiac Function  Outcome: Ongoing, Progressing     Problem: Cerebral Tissue Perfusion (Cardiovascular Surgery)  Goal: Optimal Cerebral Tissue Perfusion (Cardiovascular Surgery)  Outcome: Ongoing, Progressing     Problem: Fluid and Electrolyte Imbalance (Cardiovascular Surgery)  Goal: Fluid and Electrolyte Balance (Cardiovascular Surgery)  Outcome: Ongoing, Progressing     Problem: Glycemic Control Impaired (Cardiovascular Surgery)  Goal: Blood Glucose Level Within Targeted Range (Cardiovascular Surgery)  Outcome: Ongoing, Progressing     Problem: Infection (Cardiovascular Surgery)  Goal: Absence of Infection Signs and Symptoms  Outcome: Ongoing, Progressing     Problem: Ongoing Anesthesia Effects (Cardiovascular Surgery)  Goal: Anesthesia/Sedation Recovery  Outcome: Ongoing, Progressing     Problem: Pain  (Cardiovascular Surgery)  Goal: Acceptable Pain Control  Outcome: Ongoing, Progressing     Problem: Postoperative Nausea and Vomiting (Cardiovascular Surgery)  Goal: Nausea and Vomiting Relief (Cardiovascular Surgery)  Outcome: Ongoing, Progressing     Problem: Postoperative Urinary Retention (Cardiovascular Surgery)  Goal: Effective Urinary Elimination (Cardiovascular Surgery)  Outcome: Ongoing, Progressing     Problem: Respiratory Compromise (Cardiovascular Surgery)  Goal: Effective Oxygenation and Ventilation (Cardiovascular Surgery)  Outcome: Ongoing, Progressing     Problem: Infection  Goal: Absence of Infection Signs and Symptoms  Outcome: Ongoing, Progressing     Problem: Communication Impairment (Mechanical Ventilation, Invasive)  Goal: Effective Communication  Outcome: Ongoing, Progressing     Problem: Device-Related Complication Risk (Mechanical Ventilation, Invasive)  Goal: Optimal Device Function  Outcome: Ongoing, Progressing     Problem: Inability to Wean (Mechanical Ventilation, Invasive)  Goal: Mechanical Ventilation Liberation  Outcome: Ongoing, Progressing     Problem: Nutrition Impairment (Mechanical Ventilation, Invasive)  Goal: Optimal Nutrition Delivery  Outcome: Ongoing, Progressing     Problem: Skin and Tissue Injury (Mechanical Ventilation, Invasive)  Goal: Absence of Device-Related Skin and Tissue Injury  Outcome: Ongoing, Progressing     Problem: Ventilator-Induced Lung Injury (Mechanical Ventilation, Invasive)  Goal: Absence of Ventilator-Induced Lung Injury  Outcome: Ongoing, Progressing     Problem: Communication Impairment (Artificial Airway)  Goal: Effective Communication  Outcome: Ongoing, Progressing     Problem: Device-Related Complication Risk (Artificial Airway)  Goal: Optimal Device Function  Outcome: Ongoing, Progressing     Problem: Skin and Tissue Injury (Artificial Airway)  Goal: Absence of Device-Related Skin or Tissue Injury  Outcome: Ongoing, Progressing     Problem: Skin  Injury Risk Increased  Goal: Skin Health and Integrity  Outcome: Ongoing, Progressing     Problem: Fall Injury Risk  Goal: Absence of Fall and Fall-Related Injury  Outcome: Ongoing, Progressing

## 2022-09-15 NOTE — SUBJECTIVE & OBJECTIVE
Interval History: NAEON. Awaiting therapeutic INR. Will hold coumadin tonight for jump in INR.     Review of Systems   Constitutional: Negative for malaise/fatigue.   Cardiovascular:  Negative for chest pain, dyspnea on exertion and leg swelling.   Respiratory:  Negative for shortness of breath.    Neurological:  Negative for weakness.   Medications:  Continuous Infusions:   heparin (porcine) in D5W 18 Units/kg/hr (09/15/22 0902)     Scheduled Meds:   acetaminophen  1,000 mg Oral Q8H    aspirin  81 mg Oral Daily    atorvastatin  80 mg Oral Daily    carvediloL  6.25 mg Oral BID    docusate sodium  200 mg Oral BID    finasteride  5 mg Oral Daily    furosemide (LASIX) injection  40 mg Intravenous BID    polyethylene glycol  17 g Oral Daily     PRN Meds:bisacodyL, dextrose 10%, dextrose 10%, heparin (PORCINE), heparin (PORCINE), metoclopramide HCl, ondansetron, oxyCODONE, oxyCODONE, sodium chloride 0.9%     Objective:     Vital Signs (Most Recent):  Temp: 99.2 °F (37.3 °C) (09/15/22 0809)  Pulse: 68 (09/15/22 0815)  Resp: 18 (09/15/22 0809)  BP: (!) 157/99 (09/15/22 0809)  SpO2: 96 % (09/15/22 0809)   Vital Signs (24h Range):  Temp:  [97.5 °F (36.4 °C)-99.2 °F (37.3 °C)] 99.2 °F (37.3 °C)  Pulse:  [55-68] 68  Resp:  [15-18] 18  SpO2:  [93 %-98 %] 96 %  BP: (103-157)/(58-99) 157/99     Weight: 81.6 kg (179 lb 14.3 oz)  Body mass index is 25.09 kg/m².    SpO2: 96 %  O2 Device (Oxygen Therapy): room air    Intake/Output - Last 3 Shifts         09/13 0700  09/14 0659 09/14 0700  09/15 0659 09/15 0700 09/16 0659    P.O. 940 920 240    I.V. (mL/kg) 103.8 (1.2)      Total Intake(mL/kg) 1043.8 (11.6) 920 (10.3) 240 (2.9)    Urine (mL/kg/hr) 2100 (1) 2025 (0.9)     Total Output 2100 2025     Net -1056.2 -1105 +240           Urine Occurrence  2 x             Lines/Drains/Airways       Peripheral Intravenous Line  Duration                  Peripheral IV - Single Lumen 09/10/22 1100 18 G Anterior;Left Forearm 4 days                     Physical Exam  Constitutional:       General: He is not in acute distress.  HENT:      Head: Normocephalic and atraumatic.   Eyes:      Pupils: Pupils are equal, round, and reactive to light.   Cardiovascular:      Rate and Rhythm: Normal rate and regular rhythm.   Pulmonary:      Effort: Pulmonary effort is normal. No respiratory distress.   Abdominal:      General: There is no distension.   Musculoskeletal:         General: No swelling. Normal range of motion.      Cervical back: Normal range of motion.   Skin:     Coloration: Skin is not pale.      Comments: Midline sternal incision c/d/i   Neurological:      General: No focal deficit present.      Mental Status: He is alert and oriented to person, place, and time.   Psychiatric:         Mood and Affect: Mood normal.         Behavior: Behavior normal.       Significant Labs:  ABGs: No results for input(s): PH, PCO2, PO2, HCO3, POCSATURATED, BE in the last 48 hours.  Amylase: No results for input(s): AMYLASE in the last 48 hours.  BMP:   Recent Labs   Lab 09/15/22  0505         K 4.3      CO2 26   BUN 15   CREATININE 0.9   CALCIUM 9.4   MG 2.1     Cardiac markers: No results for input(s): CKMB, CPKMB, TROPONINT, TROPONINI, MYOGLOBIN in the last 48 hours.  CBC:   Recent Labs   Lab 09/15/22  0505   WBC 10.22   RBC 3.30*   HGB 11.0*   HCT 31.4*      MCV 95   MCH 33.3*   MCHC 35.0     CMP:   Recent Labs   Lab 09/14/22  0632 09/15/22  0505    110   CALCIUM 9.3 9.4   ALBUMIN 2.7*  --    PROT 6.5  --     138   K 4.4 4.3   CO2 27 26    103   BUN 14 15   CREATININE 1.0 0.9   ALKPHOS 150*  --    ALT 27  --    AST 31  --    BILITOT 0.8  --      Coagulation:   Recent Labs   Lab 09/15/22  0505   INR 2.1*   APTT 43.4*     Lactic Acid: No results for input(s): LACTATE in the last 48 hours.  LFTs:   Recent Labs   Lab 09/14/22  0632   ALT 27   AST 31   ALKPHOS 150*   BILITOT 0.8   PROT 6.5   ALBUMIN 2.7*     Lipase: No  results for input(s): LIPASE in the last 48 hours.    Significant Diagnostics:  I have reviewed all pertinent imaging results/findings within the past 24 hours.

## 2022-09-15 NOTE — PROGRESS NOTES
[facility] Step[facility]  Cardiothoracic Surgery  Progress Note    Patient Name: Torsten Gordillo  MRN: [id number]  Admission Date: 9/8/2022  Hospital Length of Stay: 7 days  Code Status: Full Code   Attending Physician: Jadiel Andrew MD   Referring Provider: Jadiel Andrew MD  Principal Problem:S/P mitral valve replacement      Subjective:     Post-Op Info:  Procedure(s) (LRB):  REPLACEMENT, MITRAL VALVE (N/A)  REPAIR, TRICUSPID VALVE, WITH RING INSERTION (N/A)   7 Days Post-Op     Interval History: NAEON. Awaiting therapeutic INR. Will hold coumadin tonight for jump in INR.     Review of Systems   Constitutional: Negative for malaise/fatigue.   Cardiovascular:  Negative for chest pain, dyspnea on exertion and leg swelling.   Respiratory:  Negative for shortness of breath.    Neurological:  Negative for weakness.   Medications:  Continuous Infusions:   heparin (porcine) in D5W 18 Units/kg/hr (09/15/22 0902)     Scheduled Meds:   acetaminophen  1,000 mg Oral Q8H    aspirin  81 mg Oral Daily    atorvastatin  80 mg Oral Daily    carvediloL  6.25 mg Oral BID    docusate sodium  200 mg Oral BID    finasteride  5 mg Oral Daily    furosemide (LASIX) injection  40 mg Intravenous BID    polyethylene glycol  17 g Oral Daily     PRN Meds:bisacodyL, dextrose 10%, dextrose 10%, heparin (PORCINE), heparin (PORCINE), metoclopramide HCl, ondansetron, oxyCODONE, oxyCODONE, sodium chloride 0.9%     Objective:     Vital Signs (Most Recent):  Temp: 99.2 °F (37.3 °C) (09/15/22 0809)  Pulse: 68 (09/15/22 0815)  Resp: 18 (09/15/22 0809)  BP: (!) 157/99 (09/15/22 0809)  SpO2: 96 % (09/15/22 0809)   Vital Signs (24h Range):  Temp:  [97.5 °F (36.4 °C)-99.2 °F (37.3 °C)] 99.2 °F (37.3 °C)  Pulse:  [55-68] 68  Resp:  [15-18] 18  SpO2:  [93 %-98 %] 96 %  BP: (103-157)/(58-99) 157/99     Weight: 81.6 kg (179 lb 14.3 oz)  Body mass index is 25.09 kg/m².    SpO2: 96 %  O2 Device (Oxygen Therapy): room air    Intake/Output  - Last 3 Shifts         09/13 0700 09/14 0659 09/14 0700  09/15 0659 09/15 0700 09/16 0659    P.O. 940 920 240    I.V. (mL/kg) 103.8 (1.2)      Total Intake(mL/kg) 1043.8 (11.6) 920 (10.3) 240 (2.9)    Urine (mL/kg/hr) 2100 (1) 2025 (0.9)     Total Output 2100 2025     Net -1056.2 -1105 +240           Urine Occurrence  2 x             Lines/Drains/Airways       Peripheral Intravenous Line  Duration                  Peripheral IV - Single Lumen 09/10/22 1100 18 G Anterior;Left Forearm 4 days                    Physical Exam  Constitutional:       General: He is not in acute distress.  HENT:      Head: Normocephalic and atraumatic.   Eyes:      Pupils: Pupils are equal, round, and reactive to light.   Cardiovascular:      Rate and Rhythm: Normal rate and regular rhythm.   Pulmonary:      Effort: Pulmonary effort is normal. No respiratory distress.   Abdominal:      General: There is no distension.   Musculoskeletal:         General: No swelling. Normal range of motion.      Cervical back: Normal range of motion.   Skin:     Coloration: Skin is not pale.      Comments: Midline sternal incision c/d/i   Neurological:      General: No focal deficit present.      Mental Status: He is alert and oriented to person, place, and time.   Psychiatric:         Mood and Affect: Mood normal.         Behavior: Behavior normal.       Significant Labs:  ABGs: No results for input(s): PH, PCO2, PO2, HCO3, POCSATURATED, BE in the last 48 hours.  Amylase: No results for input(s): AMYLASE in the last 48 hours.  BMP:   Recent Labs   Lab 09/15/22  0505         K 4.3      CO2 26   BUN 15   CREATININE 0.9   CALCIUM 9.4   MG 2.1     Cardiac markers: No results for input(s): CKMB, CPKMB, TROPONINT, TROPONINI, MYOGLOBIN in the last 48 hours.  CBC:   Recent Labs   Lab 09/15/22  0505   WBC 10.22   RBC 3.30*   HGB 11.0*   HCT 31.4*      MCV 95   MCH 33.3*   MCHC 35.0     CMP:   Recent Labs   Lab 09/14/22  0632  09/15/22  0505    110   CALCIUM 9.3 9.4   ALBUMIN 2.7*  --    PROT 6.5  --     138   K 4.4 4.3   CO2 27 26    103   BUN 14 15   CREATININE 1.0 0.9   ALKPHOS 150*  --    ALT 27  --    AST 31  --    BILITOT 0.8  --      Coagulation:   Recent Labs   Lab 09/15/22  0505   INR 2.1*   APTT 43.4*     Lactic Acid: No results for input(s): LACTATE in the last 48 hours.  LFTs:   Recent Labs   Lab 09/14/22  0632   ALT 27   AST 31   ALKPHOS 150*   BILITOT 0.8   PROT 6.5   ALBUMIN 2.7*     Lipase: No results for input(s): LIPASE in the last 48 hours.    Significant Diagnostics:  I have reviewed all pertinent imaging results/findings within the past 24 hours.    Assessment/Plan:     * S/P mitral valve replacement  - Bioprosthetic. Coumadin for 3 months. INR goal 2.5-3.5  - ASA 81  - Lasix and K   - Coreg   - Scheduled Tylenol   - Statin   - Bowel regimen   - INR 2.1 from 1.7. Will hold tonight (4,5,5, 7.5)   - PTT goal 60-80. Heparin gtt increased from 17units/kg/hr to 18  - PT/OT    S/P tricuspid valve repair  - See S/P MVR     Prostate CA  - follows with oncology   - finasteride     Mucopurulent chronic bronchitis  - On room air     Heart failure with reduced ejection fraction, NYHA class II  - Coreg     Controlled type 2 diabetes mellitus, without long-term current use of insulin  - endocrine following     Hyperlipidemia  - statin     Essential hypertension  - BP stable only on Coreg     Dispo: CSU. Awaiting therapeutic INR     Marti Patino PA-C  Cardiothoracic Surgery  [facility] - [facility]

## 2022-09-15 NOTE — ASSESSMENT & PLAN NOTE
- Bioprosthetic. Coumadin for 3 months. INR goal 2.5-3.5  - ASA 81  - Lasix and K   - Coreg   - Scheduled Tylenol   - Statin   - Bowel regimen   - INR 2.1 from 1.7. Will hold tonight (4,5,5, 7.5)   - PTT goal 60-80. Heparin gtt increased from 17units/kg/hr to 18  - PT/OT

## 2022-09-15 NOTE — NURSING
Patient awake at this time. NAD. No complaints of pain or discomfort. Heparin gtt infusing at 17u/kg/hr.  Free from falls, harm, and injury. Able to make needs known. Comfort and safety maintained. Call bell and personal items within reach. Will continue POC

## 2022-09-16 ENCOUNTER — DOCUMENTATION ONLY (OUTPATIENT)
Dept: CARDIOTHORACIC SURGERY | Facility: CLINIC | Age: 62
End: 2022-09-16
Payer: COMMERCIAL

## 2022-09-16 VITALS
DIASTOLIC BLOOD PRESSURE: 73 MMHG | HEART RATE: 52 BPM | TEMPERATURE: 98 F | SYSTOLIC BLOOD PRESSURE: 115 MMHG | WEIGHT: 179.88 LBS | BODY MASS INDEX: 25.18 KG/M2 | OXYGEN SATURATION: 95 % | RESPIRATION RATE: 18 BRPM | HEIGHT: 71 IN

## 2022-09-16 LAB
ANION GAP SERPL CALC-SCNC: 9 MMOL/L (ref 8–16)
APTT BLDCRRT: 63.5 SEC (ref 21–32)
BASOPHILS # BLD AUTO: 0.04 K/UL (ref 0–0.2)
BASOPHILS NFR BLD: 0.3 % (ref 0–1.9)
BUN SERPL-MCNC: 16 MG/DL (ref 8–23)
CALCIUM SERPL-MCNC: 9.3 MG/DL (ref 8.7–10.5)
CHLORIDE SERPL-SCNC: 101 MMOL/L (ref 95–110)
CO2 SERPL-SCNC: 25 MMOL/L (ref 23–29)
CREAT SERPL-MCNC: 1 MG/DL (ref 0.5–1.4)
DIFFERENTIAL METHOD: ABNORMAL
EOSINOPHIL # BLD AUTO: 0.1 K/UL (ref 0–0.5)
EOSINOPHIL NFR BLD: 1 % (ref 0–8)
ERYTHROCYTE [DISTWIDTH] IN BLOOD BY AUTOMATED COUNT: 13.5 % (ref 11.5–14.5)
EST. GFR  (NO RACE VARIABLE): >60 ML/MIN/1.73 M^2
FINAL PATHOLOGIC DIAGNOSIS: NORMAL
GLUCOSE SERPL-MCNC: 120 MG/DL (ref 70–110)
GROSS: NORMAL
HCT VFR BLD AUTO: 30.7 % (ref 40–54)
HGB BLD-MCNC: 10.6 G/DL (ref 14–18)
IMM GRANULOCYTES # BLD AUTO: 0.16 K/UL (ref 0–0.04)
IMM GRANULOCYTES NFR BLD AUTO: 1.3 % (ref 0–0.5)
INR PPP: 1.8 (ref 0.8–1.2)
LYMPHOCYTES # BLD AUTO: 1.8 K/UL (ref 1–4.8)
LYMPHOCYTES NFR BLD: 15.4 % (ref 18–48)
Lab: NORMAL
MAGNESIUM SERPL-MCNC: 2.2 MG/DL (ref 1.6–2.6)
MCH RBC QN AUTO: 32.5 PG (ref 27–31)
MCHC RBC AUTO-ENTMCNC: 34.5 G/DL (ref 32–36)
MCV RBC AUTO: 94 FL (ref 82–98)
MONOCYTES # BLD AUTO: 1.5 K/UL (ref 0.3–1)
MONOCYTES NFR BLD: 12.4 % (ref 4–15)
NEUTROPHILS # BLD AUTO: 8.3 K/UL (ref 1.8–7.7)
NEUTROPHILS NFR BLD: 69.6 % (ref 38–73)
NRBC BLD-RTO: 0 /100 WBC
PHOSPHATE SERPL-MCNC: 3.1 MG/DL (ref 2.7–4.5)
PLATELET # BLD AUTO: 481 K/UL (ref 150–450)
PMV BLD AUTO: 9.1 FL (ref 9.2–12.9)
POTASSIUM SERPL-SCNC: 4.3 MMOL/L (ref 3.5–5.1)
PROTHROMBIN TIME: 17.8 SEC (ref 9–12.5)
RBC # BLD AUTO: 3.26 M/UL (ref 4.6–6.2)
SODIUM SERPL-SCNC: 135 MMOL/L (ref 136–145)
WBC # BLD AUTO: 11.88 K/UL (ref 3.9–12.7)

## 2022-09-16 PROCEDURE — 63600175 PHARM REV CODE 636 W HCPCS: Performed by: PHYSICIAN ASSISTANT

## 2022-09-16 PROCEDURE — 99900035 HC TECH TIME PER 15 MIN (STAT)

## 2022-09-16 PROCEDURE — 85730 THROMBOPLASTIN TIME PARTIAL: CPT | Performed by: THORACIC SURGERY (CARDIOTHORACIC VASCULAR SURGERY)

## 2022-09-16 PROCEDURE — 85025 COMPLETE CBC W/AUTO DIFF WBC: CPT | Performed by: PHYSICIAN ASSISTANT

## 2022-09-16 PROCEDURE — 25000003 PHARM REV CODE 250

## 2022-09-16 PROCEDURE — 80048 BASIC METABOLIC PNL TOTAL CA: CPT | Performed by: PHYSICIAN ASSISTANT

## 2022-09-16 PROCEDURE — 83735 ASSAY OF MAGNESIUM: CPT | Performed by: PHYSICIAN ASSISTANT

## 2022-09-16 PROCEDURE — 36415 COLL VENOUS BLD VENIPUNCTURE: CPT | Performed by: THORACIC SURGERY (CARDIOTHORACIC VASCULAR SURGERY)

## 2022-09-16 PROCEDURE — 25000003 PHARM REV CODE 250: Performed by: PHYSICIAN ASSISTANT

## 2022-09-16 PROCEDURE — 84100 ASSAY OF PHOSPHORUS: CPT | Performed by: PHYSICIAN ASSISTANT

## 2022-09-16 PROCEDURE — 63600175 PHARM REV CODE 636 W HCPCS

## 2022-09-16 PROCEDURE — 85610 PROTHROMBIN TIME: CPT | Performed by: PHYSICIAN ASSISTANT

## 2022-09-16 PROCEDURE — 94664 DEMO&/EVAL PT USE INHALER: CPT

## 2022-09-16 RX ORDER — CARVEDILOL 6.25 MG/1
6.25 TABLET ORAL 2 TIMES DAILY
Qty: 60 TABLET | Refills: 11 | Status: SHIPPED | OUTPATIENT
Start: 2022-09-16 | End: 2023-03-03 | Stop reason: SDUPTHER

## 2022-09-16 RX ORDER — DOCUSATE SODIUM 100 MG/1
200 CAPSULE, LIQUID FILLED ORAL 2 TIMES DAILY
Qty: 30 CAPSULE | Refills: 0 | Status: SHIPPED | OUTPATIENT
Start: 2022-09-16 | End: 2022-10-06 | Stop reason: ALTCHOICE

## 2022-09-16 RX ORDER — ACETAMINOPHEN 500 MG
1000 TABLET ORAL EVERY 6 HOURS PRN
Qty: 30 TABLET | Refills: 0 | Status: SHIPPED | OUTPATIENT
Start: 2022-09-16 | End: 2023-01-06

## 2022-09-16 RX ORDER — OXYCODONE HYDROCHLORIDE 5 MG/1
5 TABLET ORAL EVERY 4 HOURS PRN
Qty: 42 TABLET | Refills: 0 | Status: SHIPPED | OUTPATIENT
Start: 2022-09-16 | End: 2022-09-23

## 2022-09-16 RX ORDER — POLYETHYLENE GLYCOL 3350 17 G/17G
17 POWDER, FOR SOLUTION ORAL DAILY
Qty: 5 EACH | Refills: 0 | Status: SHIPPED | OUTPATIENT
Start: 2022-09-17 | End: 2022-10-06 | Stop reason: ALTCHOICE

## 2022-09-16 RX ORDER — ENOXAPARIN SODIUM 100 MG/ML
100 INJECTION SUBCUTANEOUS 2 TIMES DAILY
Qty: 8 ML | Refills: 1 | Status: SHIPPED | OUTPATIENT
Start: 2022-09-16 | End: 2022-09-20

## 2022-09-16 RX ORDER — POTASSIUM CHLORIDE 20 MEQ/1
20 TABLET, EXTENDED RELEASE ORAL DAILY
Qty: 45 TABLET | Refills: 3 | Status: SHIPPED | OUTPATIENT
Start: 2022-09-16 | End: 2022-10-06 | Stop reason: ALTCHOICE

## 2022-09-16 RX ORDER — ENOXAPARIN SODIUM 100 MG/ML
1 INJECTION SUBCUTANEOUS
Status: DISCONTINUED | OUTPATIENT
Start: 2022-09-16 | End: 2022-09-16 | Stop reason: HOSPADM

## 2022-09-16 RX ORDER — FUROSEMIDE 20 MG/1
TABLET ORAL
Qty: 45 TABLET | Refills: 11 | Status: SHIPPED | OUTPATIENT
Start: 2022-09-16 | End: 2022-10-06 | Stop reason: ALTCHOICE

## 2022-09-16 RX ORDER — WARFARIN 7.5 MG/1
7.5 TABLET ORAL DAILY
Qty: 30 TABLET | Refills: 11 | Status: SHIPPED | OUTPATIENT
Start: 2022-09-16 | End: 2023-03-03

## 2022-09-16 RX ORDER — WARFARIN 7.5 MG/1
7.5 TABLET ORAL DAILY
Status: DISCONTINUED | OUTPATIENT
Start: 2022-09-16 | End: 2022-09-16 | Stop reason: HOSPADM

## 2022-09-16 RX ADMIN — ATORVASTATIN CALCIUM 80 MG: 40 TABLET, FILM COATED ORAL at 08:09

## 2022-09-16 RX ADMIN — HEPARIN SODIUM 18 UNITS/KG/HR: 5000 INJECTION INTRAVENOUS; SUBCUTANEOUS at 01:09

## 2022-09-16 RX ADMIN — ACETAMINOPHEN 1000 MG: 500 TABLET ORAL at 02:09

## 2022-09-16 RX ADMIN — ASPIRIN 81 MG: 81 TABLET, COATED ORAL at 08:09

## 2022-09-16 RX ADMIN — DOCUSATE SODIUM 200 MG: 100 CAPSULE ORAL at 08:09

## 2022-09-16 RX ADMIN — ACETAMINOPHEN 1000 MG: 500 TABLET ORAL at 05:09

## 2022-09-16 RX ADMIN — WARFARIN SODIUM 7.5 MG: 7.5 TABLET ORAL at 04:09

## 2022-09-16 RX ADMIN — POLYETHYLENE GLYCOL 3350 17 G: 17 POWDER, FOR SOLUTION ORAL at 08:09

## 2022-09-16 RX ADMIN — CARVEDILOL 6.25 MG: 6.25 TABLET, FILM COATED ORAL at 08:09

## 2022-09-16 RX ADMIN — FINASTERIDE 5 MG: 5 TABLET, FILM COATED ORAL at 08:09

## 2022-09-16 RX ADMIN — FUROSEMIDE 40 MG: 10 INJECTION, SOLUTION INTRAMUSCULAR; INTRAVENOUS at 08:09

## 2022-09-16 RX ADMIN — ENOXAPARIN SODIUM 80 MG: 100 INJECTION SUBCUTANEOUS at 04:09

## 2022-09-16 NOTE — PT/OT/SLP PROGRESS
Occupational Therapy      Patient Name:  Torsten Gordillo   MRN:  [id number]    Upon chart review, pt continues to ambulate with [facility] [place] and is scheduled for d/c this date. OT will followup if pt does not d/c this date.   9/16/2022

## 2022-09-16 NOTE — NURSING
Patient discharged from hospital. AVS given and explained and patient indicates understanding. Patient is being discharged with Lovenox injection. Teaching was given and patient indicates understanding. No acute distress noted upon departure.

## 2022-09-16 NOTE — CONSULTS
Food & Nutrition  Education    Diet Education: Coumadin Diet Education  Time Spent: 6 minutes  Learners: Pt       Nutrition Education provided with handouts:   Discussed which foods have Vitamin K and to keep Vitamin K intake consistent in daily diet.     Comments: Pt had no additional questions about diet education. All questions answered.       All questions and concerns answered. Dietitian's contact information provided.       Follow-Up: Yes     Please Re-consult as needed        Thanks!

## 2022-09-16 NOTE — HOSPITAL COURSE
On 9/8/22, the patient was taken to the Operating Room for the above stated procedure. Please see the previously dictated operative report for complete details. Postoperatively, the patient was taken from the  Operating Room to the ICU where the vital signs were monitored and pain was kept under control. The patient was weaned from the drips and extubated in the ICU per protocol. Once hemodynamically stable, the patient was transferred to the Cardiac Step-Down floor for continued strengthening and ambulation. On postoperative day 8, the patient was ready for discharge to home. At the time of discharge, the patient was ambulating unassisted. Pain was well controlled with oral analgesics and the patient was tolerating the diet.     MOBILITY AND ACTIVITY: As tolerated. Patient may shower. No heavy lifting of greater than 5 pounds and no driving.     DIET: An 1800-calorie ADA with a 1500 mL fluid restriction.     WOUND CARE INSTRUCTIONS: Check for redness, swelling and drainage around the  incision or wound. Patient is to call for any obvious bleeding, drainage, pus from the wound, unusual problems or difficulties or temperature of greater than 101   degrees.     FOLLOWUP: Follow up with Dr. Andrew in approximately 3 weeks. Prior to this  appointment, the patient will have a chest x-ray and EKG.     Patient not placed on Ace-Inhibitor at the time of discharge due to potential for hypotension     Patient will be discharged with Lovenox bridge until INR is therapeutic. Coumadin level to be follow by Dr. Fair's office. Goal is 2.5-3.5 for 3 months for bioprosthetic mitral valve.     DISCHARGE CONDITION: At the time of discharge, the patient was in sinus rhythm and afebrile with stable vital signs.

## 2022-09-16 NOTE — DISCHARGE SUMMARY
[facility]  Cardiothoracic Surgery  Discharge Summary      Patient Name: Torsten Gordillo  MRN: [id number]  Admission Date: 9/8/2022  Hospital Length of Stay: 8 days  Discharge Date and Time:  09/16/2022 11:29 AM  Attending Physician: Jadiel Andrew MD   Discharging Provider: Marti Patino PA-C  Primary Care Provider: Marcin Farley MD    HPI:   Mr. Gordillo is a very pleasant 62-year-old gentleman who initially presented with progressive dyspnea on exertion.  He also reports decreased stamina.  He underwent a thoughtful and thorough evaluation which included echocardiography.  This study demonstrated severe mitral regurgitation and an ejection fraction of 38%.  He now presents for surgical correction.      Procedure(s) (LRB):  REPLACEMENT, MITRAL VALVE (N/A)  REPAIR, TRICUSPID VALVE, WITH RING INSERTION (N/A)      Hospital Course: On 9/8/22, the patient was taken to the Operating Room for the above stated procedure. Please see the previously dictated operative report for complete details. Postoperatively, the patient was taken from the  Operating Room to the ICU where the vital signs were monitored and pain was kept under control. The patient was weaned from the drips and extubated in the ICU per protocol. Once hemodynamically stable, the patient was transferred to the Cardiac Step-Down floor for continued strengthening and ambulation. On postoperative day 8, the patient was ready for discharge to home. At the time of discharge, the patient was ambulating unassisted. Pain was well controlled with oral analgesics and the patient was tolerating the diet.     MOBILITY AND ACTIVITY: As tolerated. Patient may shower. No heavy lifting of greater than 5 pounds and no driving.     DIET: An 1800-calorie ADA with a 1500 mL fluid restriction.     WOUND CARE INSTRUCTIONS: Check for redness, swelling and drainage around the  incision or wound. Patient is to call for any obvious bleeding, drainage,  pus from the wound, unusual problems or difficulties or temperature of greater than 101   degrees.     FOLLOWUP: Follow up with Dr. Andrew in approximately 3 weeks. Prior to this  appointment, the patient will have a chest x-ray and EKG.     Patient not placed on Ace-Inhibitor at the time of discharge due to potential for hypotension     Patient will be discharged with Lovenox bridge until INR is therapeutic. Coumadin level to be follow by Dr. Fair's office. Goal is 2.5-3.5 for 3 months for bioprosthetic mitral valve.     DISCHARGE CONDITION: At the time of discharge, the patient was in sinus rhythm and afebrile with stable vital signs.        Goals of Care Treatment Preferences:  Code Status: Full Code      Consults (From admission, onward)        Status Ordering Provider     Inpatient consult to Registered Dietitian/Nutritionist  Once        Provider:  (Not yet assigned)    Ordered ADELSO CAMERON     Consult to Endocrinology  Once        Provider:  (Not yet assigned)    Completed ADELSO CAMERON     Consult Case Management/Social Work  Once        Provider:  (Not yet assigned)    Acknowledged ADELSO CAMERON          Significant Diagnostic Studies:     Pending Diagnostic Studies:     Procedure Component Value Units Date/Time    CBC auto differential [[id number]]     Order Status: Sent Lab Status: No result     Specimen: Blood     Magnesium [[id number]]     Order Status: Sent Lab Status: No result     Specimen: Blood     Potassium [[id number]] Collected: 09/08/22 1913    Order Status: Sent Lab Status: In process Updated: 09/08/22 1914    Specimen: Blood     Specimen to Pathology, Surgery Cardiovascular [[id number]] Collected: 09/08/22 1148    Order Status: Sent Lab Status: In process Updated: 09/09/22 0917    Specimen: Tissue           No new Assessment & Plan notes have been filed under this hospital service since the last note was generated.  Service: Cardiothoracic Surgery    Final Active  Diagnoses:    Diagnosis Date Noted POA    PRINCIPAL PROBLEM:  S/P mitral valve replacement [Z95.2] 09/08/2022 Not Applicable    S/P tricuspid valve repair [Z98.890] 09/08/2022 Not Applicable    Prostate CA [C61] 06/14/2022 Yes    Mucopurulent chronic bronchitis [J41.1]  Yes    Heart failure with reduced ejection fraction, NYHA class II [I50.20] 07/09/2021 Yes    Controlled type 2 diabetes mellitus, without long-term current use of insulin [E11.9] 05/11/2016 Yes    Hyperlipidemia [E78.5] 05/02/2016 Yes    Essential hypertension [I10] 03/11/2016 Yes      Problems Resolved During this Admission:      Discharged Condition: stable    Disposition: Home or Self Care    Follow Up:    Patient Instructions:      Ambulatory referral/consult to Anticoagulation Monitoring   Standing Status: Future   Referral Priority: Routine Referral Type: Consultation   Referral Reason: Specialty Services Required   Requested Specialty: Cardiology   Number of Visits Requested: 1     Ambulatory referral/consult to Home Health   Standing Status: Future   Referral Priority: Routine Referral Type: Home Health   Referral Reason: Specialty Services Required   Requested Specialty: Home Health Services   Number of Visits Requested: 1     Medications:  Reconciled Home Medications:      Medication List      START taking these medications    docusate sodium 100 MG capsule  Commonly known as: COLACE  Take 2 capsules (200 mg total) by mouth 2 (two) times daily.     enoxaparin 100 mg/mL Syrg  Commonly known as: LOVENOX  Inject 1 mL (100 mg total) into the skin 2 (two) times a day. Use until INR level is 2.5-3.5 for 4 days     oxyCODONE 5 MG immediate release tablet  Commonly known as: ROXICODONE  Take 1 tablet (5 mg total) by mouth every 4 (four) hours as needed for Pain.     polyethylene glycol 17 gram Pwpk  Commonly known as: GLYCOLAX  Take 17 g by mouth once daily.  Start taking on: September 17, 2022     potassium chloride SA 20 MEQ  tablet  Commonly known as: K-DUR,KLOR-CON  Take 1 tablet (20 mEq total) by mouth once daily.     warfarin 7.5 MG tablet  Commonly known as: COUMADIN  Take 1 tablet (7.5 mg total) by mouth Daily.        CHANGE how you take these medications    acetaminophen 500 MG tablet  Commonly known as: TYLENOL  Take 2 tablets (1,000 mg total) by mouth every 6 (six) hours as needed for Pain.  What changed:   · medication strength  · how much to take     carvediloL 6.25 MG tablet  Commonly known as: COREG  Take 1 tablet (6.25 mg total) by mouth 2 (two) times daily.  What changed:   · medication strength  · how much to take  · when to take this     furosemide 20 MG tablet  Commonly known as: LASIX  Take one tablet by mouth twice daily for 7 days then decrease to once daily  What changed:   · medication strength  · how much to take  · how to take this  · when to take this  · reasons to take this  · additional instructions        CONTINUE taking these medications    albuterol 90 mcg/actuation inhaler  Commonly known as: PROVENTIL/VENTOLIN HFA  Inhale 2 puffs into the lungs every 6 (six) hours as needed for Wheezing. Rescue     aspirin 81 MG EC tablet  Commonly known as: ECOTRIN  Take 81 mg by mouth once daily.     atorvastatin 80 MG tablet  Commonly known as: LIPITOR  Take 1 tablet (80 mg total) by mouth every evening.     cetirizine 10 MG tablet  Commonly known as: ZYRTEC  Take 1 tablet (10 mg total) by mouth once daily.     diclofenac sodium 1 % Gel  Commonly known as: VOLTAREN  Apply 2 g topically 4 (four) times daily.     ezetimibe 10 mg tablet  Commonly known as: ZETIA  Take 1 tablet (10 mg total) by mouth once daily.     finasteride 5 mg tablet  Commonly known as: PROSCAR  Take 1 tablet (5 mg total) by mouth once daily.     fluticasone propionate 50 mcg/actuation nasal spray  Commonly known as: FLONASE  1 spray (50 mcg total) by Each Nostril route once daily.     glimepiride 4 MG tablet  Commonly known as: AMARYL  Take 1 tablet  (4 mg total) by mouth daily with breakfast.     lancets Misc  Test daily     SPIRIVA WITH HANDIHALER 18 mcg inhalation capsule  Generic drug: tiotropium  Inhale 1 capsule (18 mcg total) into the lungs once daily.     tiZANidine 4 MG tablet  Commonly known as: ZANAFLEX  Take 1 tablet (4 mg total) by mouth every 8 (eight) hours as needed (lower back pain or muscle spasm).        STOP taking these medications    ENTRESTO 49-51 mg per tablet  Generic drug: sacubitriL-valsartan     nitroGLYCERIN 0.4 MG SL tablet  Commonly known as: NITROSTAT     spironolactone 25 MG tablet  Commonly known as: ALDACTONE     traMADoL 50 mg tablet  Commonly known as: ULTRAM          Time spent on the discharge of patient: 30 minutes    Marti Patino PA-C  Cardiothoracic Surgery  [facility] - Cardiology Stepdown

## 2022-09-16 NOTE — PROGRESS NOTES
Met with pt at bedside and reviewed wound care instructions for pt's midsternal and chest tube site incisions.  Reviewed daily inspection and cleaning of surgical incisions and instructed pt to call the clinic with any questions or concerns.  Pt provided with a hand-out (see below) which contains detailed instructions on daily wound care inspection and care.  Pt reminded of his 5 pound lifting, pushing, and pulling restrictions for the first 6 weeks following his surgery and to refrain from driving until released by Dr. Andrew.  Pt informed of his post-op appts on October 6 and was provided with a copy of his appts for that day.  Pt verbalized understanding of all instructions.       Showering     Shower daily with normal bar soap, not perfumed soap or body wash. During your recovery, do not try a new brand of soap. Only use soap and water to cleanse the sites.     Shower with your back to the shower spray.  It is ok for your incision to get wet, but the shower spray should not directly hit your chest.    Place soapy water on your hand or on a clean washcloth and gently wash your incisions using an up-and-down motion.     The water temperature should be warm -- not too hot or cold. Extreme water temperatures can cause you to feel faint.    Do not apply ointments, oils, or salves to your incisions.     Pat the skin gently to dry using a clean towel.    Do not soak in a tub.     Caring for your Incision    Wash your hands before and after caring for or touching your incisions.    Look in the mirror daily. Inspect your incisions for redness, drainage and warmth. Your incisions should be healing; there should NOT be an increase in opening.    Gently wash your incisions every day following the instructions listed above.     Remove the steri-strips very gently (while in the shower) after you have been home for 1 week or when the edges of the strips start to curl up.     You do not need to cover your incisions unless they  are draining.  If you are experiencing drainage, it is important to call your doctor.  Monday - Friday, from 8:00am - 5pm, call [phone].      When to Call    Increased drainage or oozing from your incision(s).    Increased opening of the incision line(s) - there should not be gaps or openings of the incision.    Redness along the incision(s) - if the incisions were pink and red when you left the hospital, they should be improving and not becoming more red.     Warmth along the incision line(s).    Increased body temperature/fever of 100.4 degrees Fahrenheit or higher.    If you have diabetes and your blood sugar levels begin to vary.

## 2022-09-16 NOTE — HPI
Mr. Gordillo is a very pleasant 62-year-old gentleman who initially presented with progressive dyspnea on exertion.  He also reports decreased stamina.  He underwent a thoughtful and thorough evaluation which included echocardiography.  This study demonstrated severe mitral regurgitation and an ejection fraction of 38%.  He now presents for surgical correction.

## 2022-09-16 NOTE — NURSING
Patient awake at this time. NAD. No complaints of pain or discomfort. Heparin gtt infusing at 18u/kg/hr.  Free from falls, harm, and injury. Able to make needs known. Comfort and safety maintained. Patient anxious to go home today. Call bell and personal items within reach. Will continue POC

## 2022-09-19 ENCOUNTER — LAB VISIT (OUTPATIENT)
Dept: LAB | Facility: HOSPITAL | Age: 62
End: 2022-09-19
Attending: THORACIC SURGERY (CARDIOTHORACIC VASCULAR SURGERY)
Payer: COMMERCIAL

## 2022-09-19 DIAGNOSIS — Z95.2 HEART VALVE REPLACED BY TRANSPLANT: Primary | ICD-10-CM

## 2022-09-19 DIAGNOSIS — I34.0 MITRAL INCOMPETENCE: ICD-10-CM

## 2022-09-19 DIAGNOSIS — E11.9 DIABETES MELLITUS WITHOUT COMPLICATION: ICD-10-CM

## 2022-09-19 DIAGNOSIS — E78.5 HYPERLIPEMIA: ICD-10-CM

## 2022-09-19 LAB
INR PPP: 2.1 (ref 0.8–1.2)
PROTHROMBIN TIME: 20.9 SEC (ref 9–12.5)

## 2022-09-19 PROCEDURE — 85610 PROTHROMBIN TIME: CPT | Performed by: THORACIC SURGERY (CARDIOTHORACIC VASCULAR SURGERY)

## 2022-09-19 PROCEDURE — 36415 COLL VENOUS BLD VENIPUNCTURE: CPT | Performed by: THORACIC SURGERY (CARDIOTHORACIC VASCULAR SURGERY)

## 2022-09-20 ENCOUNTER — PATIENT OUTREACH (OUTPATIENT)
Dept: ADMINISTRATIVE | Facility: CLINIC | Age: 62
End: 2022-09-20
Payer: COMMERCIAL

## 2022-09-20 NOTE — PROGRESS NOTES
C3 nurse spoke with Torsten Gordillo for a TCC post hospital discharge follow up call. The patient does not have a scheduled HOSFU appointment with Marcin Farley MD within 5-7 days post hospital discharge date 09/16/2022. C3 nurse was unable to schedule HOSFU appointment in [facility].    Message sent to PCP staff requesting they contact patient and schedule follow up appointment.    Out of NP Network.

## 2022-09-20 NOTE — ANESTHESIA PROCEDURE NOTES
Bilateral TTP single shot block    Patient location during procedure: OR   Block not for primary anesthetic.  Reason for block: at surgeon's request and post-op pain management   Post-op Pain Location: bilateral chest pain   Start time: 9/8/2022 7:25 AM  Timeout: 9/8/2022 7:25 AM   End time: 9/8/2022 7:29 AM    Staffing  Authorizing Provider: Yaquelin Ann MD  Performing Provider: Hilary Rodas MD    Preanesthetic Checklist  Completed: patient identified, IV checked, site marked, risks and benefits discussed, surgical consent, monitors and equipment checked, pre-op evaluation and timeout performed  Peripheral Block  Patient position: supine  Prep: ChloraPrep  Patient monitoring: heart rate, cardiac monitor, continuous pulse ox, continuous capnometry and frequent blood pressure checks  Block type: Transversus thoracis  Laterality: bilateral  Injection technique: single shot  Needle  Needle type: Stimuplex   Needle gauge: 20 G  Needle length: 4 in  Needle localization: anatomical landmarks and ultrasound guidance     Assessment  Injection assessment: negative aspiration and local visualized surrounding nerve  Paresthesia pain: none  Heart rate change: no  Slow fractionated injection: yes  Pain Tolerance: comfortable throughout block  Medications:    Medications: bupivacaine (pf) (MARCAINE) injection 0.5% - Perineural   30 mL - 9/8/2022 7:29:00 AM    Additional Notes  Bilateral TTP single shot block performed under ultrasound guidance.  Administered 15cc of 0.5% bupivacaine on the left, followed by 15cc of 0.5% bupivacaine on the right.  Negative aspiration and visualization of local spread confirmed with ultrasound.   VSS. Patient tolerated well. No immediate complications.  Anesthesia MD present and monitored throughout procedure. See intraop flowsheet.             Authored by Resident/PA/NP

## 2022-09-23 ENCOUNTER — TELEPHONE (OUTPATIENT)
Dept: CARDIOTHORACIC SURGERY | Facility: CLINIC | Age: 62
End: 2022-09-23
Payer: COMMERCIAL

## 2022-09-23 PROBLEM — N45.3 ORCHITIS AND EPIDIDYMITIS: Status: RESOLVED | Noted: 2022-06-11 | Resolved: 2022-09-23

## 2022-09-23 NOTE — TELEPHONE ENCOUNTER
Attempted call to pt to follow up post-hospital discharge. No answer, unable to leave VM. Will attempt call again later.

## 2022-10-05 ENCOUNTER — TELEPHONE (OUTPATIENT)
Dept: CARDIOTHORACIC SURGERY | Facility: CLINIC | Age: 62
End: 2022-10-05
Payer: COMMERCIAL

## 2022-10-05 NOTE — PROGRESS NOTES
Patient seen and examined. Patient is progressively increasing activity. Complains of pain to inferior aspect of incision from which milky drainage started several days ago. Denies any fevers or chills.     Sternum: stable, incision CDI  Chest xray: Acceptable post op chest  EKG: NSR     Assessment:  S/P MVR/TVr  9/8/22     Plan:  Can begin driving as long as he has power steering  Can begin cardiac rehab in the next couple of weeks  We will refer to cardiology to assume care - Dr. Manjula wilson DC potassium  What appears to be a small stitch abscess to the inferior portion of his incision. Tender to the touch. Manually expressed some white and bloody drainage with visible reduction in size and tenderness. Will start Doxy for 10 days. Patient to call if does not resolve.     No scheduled appointment, RTC prn        CTS Attending Note:    I have personally taken the history and examined this patient and agree with the KAMALJIT's note as stated above.  Likely stitch abscess. Will start abx. Pt knows to return if worsens.

## 2022-10-05 NOTE — TELEPHONE ENCOUNTER
"Returned call to  nurse who states she assessed pt today and found the pt to have "milky" drainage coming from the bottom of his sternal incision. Pt reports no fevers or any other issues with incision.     Contact pt to discuss. Pt states he has been experiencing this drainage for a few days now. Surrounding area is tender to the touch. No fevers per pt. Pt scheduled for post-op evaluation tomorrow. Pt confirmed he will be here for that appointment. Incision will be assessed then. Pt verbalized understanding of all information.       ----- Message from Elsi Barone sent at 10/5/2022  1:47 PM CDT -----  Regarding: HOME HEALTH  Contact: Nereyda harris/[facility] [phone]  Calling in regards to wound having white milky drainage detected by home health nurse. Patient states that he is still having pain near incision, top of sternum. Please call and advise.    "

## 2022-10-06 ENCOUNTER — OFFICE VISIT (OUTPATIENT)
Dept: CARDIOTHORACIC SURGERY | Facility: CLINIC | Age: 62
End: 2022-10-06
Payer: COMMERCIAL

## 2022-10-06 ENCOUNTER — HOSPITAL ENCOUNTER (OUTPATIENT)
Dept: RADIOLOGY | Facility: HOSPITAL | Age: 62
Discharge: HOME | End: 2022-10-06
Attending: THORACIC SURGERY (CARDIOTHORACIC VASCULAR SURGERY)
Payer: COMMERCIAL

## 2022-10-06 ENCOUNTER — HOSPITAL ENCOUNTER (OUTPATIENT)
Dept: CARDIOLOGY | Facility: CLINIC | Age: 62
Discharge: HOME | End: 2022-10-06
Payer: COMMERCIAL

## 2022-10-06 VITALS
WEIGHT: 187.5 LBS | SYSTOLIC BLOOD PRESSURE: 137 MMHG | OXYGEN SATURATION: 100 % | RESPIRATION RATE: 18 BRPM | HEIGHT: 71 IN | DIASTOLIC BLOOD PRESSURE: 82 MMHG | HEART RATE: 60 BPM | BODY MASS INDEX: 26.25 KG/M2

## 2022-10-06 DIAGNOSIS — Z95.2 S/P MITRAL VALVE REPLACEMENT: Primary | ICD-10-CM

## 2022-10-06 DIAGNOSIS — Z98.890 S/P TRICUSPID VALVE REPAIR: ICD-10-CM

## 2022-10-06 DIAGNOSIS — Z98.890 S/P MITRAL VALVE REPAIR: ICD-10-CM

## 2022-10-06 PROCEDURE — 3008F PR BODY MASS INDEX (BMI) DOCUMENTED: ICD-10-PCS | Mod: CPTII,S$GLB,, | Performed by: THORACIC SURGERY (CARDIOTHORACIC VASCULAR SURGERY)

## 2022-10-06 PROCEDURE — 3044F PR MOST RECENT HEMOGLOBIN A1C LEVEL <7.0%: ICD-10-PCS | Mod: CPTII,S$GLB,, | Performed by: THORACIC SURGERY (CARDIOTHORACIC VASCULAR SURGERY)

## 2022-10-06 PROCEDURE — 3079F DIAST BP 80-89 MM HG: CPT | Mod: CPTII,S$GLB,, | Performed by: THORACIC SURGERY (CARDIOTHORACIC VASCULAR SURGERY)

## 2022-10-06 PROCEDURE — 99999 PR PBB SHADOW E&M-EST. PATIENT-LVL IV: ICD-10-PCS | Mod: PBBFAC,,, | Performed by: THORACIC SURGERY (CARDIOTHORACIC VASCULAR SURGERY)

## 2022-10-06 PROCEDURE — 99999 PR PBB SHADOW E&M-EST. PATIENT-LVL IV: CPT | Mod: PBBFAC,,, | Performed by: THORACIC SURGERY (CARDIOTHORACIC VASCULAR SURGERY)

## 2022-10-06 PROCEDURE — 71046 X-RAY EXAM CHEST 2 VIEWS: CPT | Mod: 26,,, | Performed by: RADIOLOGY

## 2022-10-06 PROCEDURE — 4010F PR ACE/ARB THEARPY RXD/TAKEN: ICD-10-PCS | Mod: CPTII,S$GLB,, | Performed by: THORACIC SURGERY (CARDIOTHORACIC VASCULAR SURGERY)

## 2022-10-06 PROCEDURE — 3075F SYST BP GE 130 - 139MM HG: CPT | Mod: CPTII,S$GLB,, | Performed by: THORACIC SURGERY (CARDIOTHORACIC VASCULAR SURGERY)

## 2022-10-06 PROCEDURE — 3072F PR LOW RISK FOR RETINOPATHY: ICD-10-PCS | Mod: CPTII,S$GLB,, | Performed by: THORACIC SURGERY (CARDIOTHORACIC VASCULAR SURGERY)

## 2022-10-06 PROCEDURE — 3061F PR NEG MICROALBUMINURIA RESULT DOCUMENTED/REVIEW: ICD-10-PCS | Mod: CPTII,S$GLB,, | Performed by: THORACIC SURGERY (CARDIOTHORACIC VASCULAR SURGERY)

## 2022-10-06 PROCEDURE — 3075F PR MOST RECENT SYSTOLIC BLOOD PRESS GE 130-139MM HG: ICD-10-PCS | Mod: CPTII,S$GLB,, | Performed by: THORACIC SURGERY (CARDIOTHORACIC VASCULAR SURGERY)

## 2022-10-06 PROCEDURE — 93010 ELECTROCARDIOGRAM REPORT: CPT | Mod: S$GLB,,, | Performed by: INTERNAL MEDICINE

## 2022-10-06 PROCEDURE — 3044F HG A1C LEVEL LT 7.0%: CPT | Mod: CPTII,S$GLB,, | Performed by: THORACIC SURGERY (CARDIOTHORACIC VASCULAR SURGERY)

## 2022-10-06 PROCEDURE — 3066F PR DOCUMENTATION OF TREATMENT FOR NEPHROPATHY: ICD-10-PCS | Mod: CPTII,S$GLB,, | Performed by: THORACIC SURGERY (CARDIOTHORACIC VASCULAR SURGERY)

## 2022-10-06 PROCEDURE — 3072F LOW RISK FOR RETINOPATHY: CPT | Mod: CPTII,S$GLB,, | Performed by: THORACIC SURGERY (CARDIOTHORACIC VASCULAR SURGERY)

## 2022-10-06 PROCEDURE — 71046 XR CHEST PA AND LATERAL: ICD-10-PCS | Mod: 26,,, | Performed by: RADIOLOGY

## 2022-10-06 PROCEDURE — 99499 NO LOS: ICD-10-PCS | Mod: S$GLB,,, | Performed by: THORACIC SURGERY (CARDIOTHORACIC VASCULAR SURGERY)

## 2022-10-06 PROCEDURE — 71046 X-RAY EXAM CHEST 2 VIEWS: CPT | Mod: TC,FY

## 2022-10-06 PROCEDURE — 1159F PR MEDICATION LIST DOCUMENTED IN MEDICAL RECORD: ICD-10-PCS | Mod: CPTII,S$GLB,, | Performed by: THORACIC SURGERY (CARDIOTHORACIC VASCULAR SURGERY)

## 2022-10-06 PROCEDURE — 99499 UNLISTED E&M SERVICE: CPT | Mod: S$GLB,,, | Performed by: THORACIC SURGERY (CARDIOTHORACIC VASCULAR SURGERY)

## 2022-10-06 PROCEDURE — 3066F NEPHROPATHY DOC TX: CPT | Mod: CPTII,S$GLB,, | Performed by: THORACIC SURGERY (CARDIOTHORACIC VASCULAR SURGERY)

## 2022-10-06 PROCEDURE — 3079F PR MOST RECENT DIASTOLIC BLOOD PRESSURE 80-89 MM HG: ICD-10-PCS | Mod: CPTII,S$GLB,, | Performed by: THORACIC SURGERY (CARDIOTHORACIC VASCULAR SURGERY)

## 2022-10-06 PROCEDURE — 3061F NEG MICROALBUMINURIA REV: CPT | Mod: CPTII,S$GLB,, | Performed by: THORACIC SURGERY (CARDIOTHORACIC VASCULAR SURGERY)

## 2022-10-06 PROCEDURE — 1159F MED LIST DOCD IN RCRD: CPT | Mod: CPTII,S$GLB,, | Performed by: THORACIC SURGERY (CARDIOTHORACIC VASCULAR SURGERY)

## 2022-10-06 PROCEDURE — 93010 EKG 12-LEAD: ICD-10-PCS | Mod: S$GLB,,, | Performed by: INTERNAL MEDICINE

## 2022-10-06 PROCEDURE — 3008F BODY MASS INDEX DOCD: CPT | Mod: CPTII,S$GLB,, | Performed by: THORACIC SURGERY (CARDIOTHORACIC VASCULAR SURGERY)

## 2022-10-06 PROCEDURE — 93005 EKG 12-LEAD: ICD-10-PCS | Mod: S$GLB,,, | Performed by: THORACIC SURGERY (CARDIOTHORACIC VASCULAR SURGERY)

## 2022-10-06 PROCEDURE — 93005 ELECTROCARDIOGRAM TRACING: CPT | Mod: S$GLB,,, | Performed by: THORACIC SURGERY (CARDIOTHORACIC VASCULAR SURGERY)

## 2022-10-06 PROCEDURE — 4010F ACE/ARB THERAPY RXD/TAKEN: CPT | Mod: CPTII,S$GLB,, | Performed by: THORACIC SURGERY (CARDIOTHORACIC VASCULAR SURGERY)

## 2022-10-06 RX ORDER — DOXYCYCLINE 100 MG/1
100 CAPSULE ORAL 2 TIMES DAILY
Qty: 20 CAPSULE | Refills: 0 | Status: SHIPPED | OUTPATIENT
Start: 2022-10-06 | End: 2022-10-16

## 2022-10-06 NOTE — LETTER
October 6, 2022        Clark Fair MD  [address]  [place] [address]             [facility] 2nd Fl  [address]  [facility] [address]  Phone: [phone]   Patient: Torsten Gordillo   MR Number: [id number]   YOB: 1960   Date of Visit: 10/6/2022       Dear Dr. Fair:    Thank you for referring Torsten Gordillo to me for evaluation. Below are the relevant portions of my assessment and plan of care.            If you have questions, please do not hesitate to call me. I look forward to following Torsten along with you.    Sincerely,      MD DESIRE Espinal MD Ibrahim K. El-Abbassi, MD

## 2022-10-07 ENCOUNTER — DOCUMENTATION ONLY (OUTPATIENT)
Dept: CARDIOTHORACIC SURGERY | Facility: CLINIC | Age: 62
End: 2022-10-07
Payer: COMMERCIAL

## 2022-10-07 NOTE — PROGRESS NOTES
10/6/2022    Pt scheduled to follow-up with his cardiologist, Dr. Fair.  Pt reminded to continue with his 5 pound lifting, pushing, and pulling restrictions for two more weeks, and that he can then advance to lifting, pushing, and pulling up to 20 pounds for 6 weeks, for a total of 12 weeks of restrictions.  Pt reminded to continue washing his incisions everyday with soap and water.  Pt provided with 10 day prescription for doxycycline due to drainage at bottom of mid-sternal incision. Pt was provided with a copy of his AVS, and instructed on medication changes.  Pt verbalized understanding of all instructions.

## 2022-10-28 ENCOUNTER — TELEPHONE (OUTPATIENT)
Dept: CARDIOTHORACIC SURGERY | Facility: CLINIC | Age: 62
End: 2022-10-28
Payer: COMMERCIAL

## 2022-10-28 NOTE — TELEPHONE ENCOUNTER
Returned call to AARON Mckeon with  who states he saw pt today and pt states he recently had a 14 lb weight gain. Pt had been documenting a weight of 178 lbs and then today weighed himself as 200 lbs. HH nurse also took weight as 200 lbs. HH nurse states pt does not have any swelling, orthopnea, PND, and lungs are clear. Pt concerned though since he recently stopped lasix (lasix was discontinued by CTS at pt's post-op appt on 10/6/22).     Reviewed pt's chart and advised HH nurse that at pt's post op appt with CTS on 10/6/22 we documented his weight as 187 lbs. Advised HH nurse that pt should reach out to his cardiologist if he feels concerned about stopping lasix since his cardiologist will be managing his medications now.  nurse verbalized understanding.      ----- Message from Michelle Liao sent at 10/28/2022 11:59 AM CDT -----  Contact: Mike Mckeon (AARON) is requesting a callback in regards to pt.       Confirmed contact below:   Contact Name:Mike WALKER  Phone Number: [phone]

## 2022-11-02 ENCOUNTER — TELEPHONE (OUTPATIENT)
Dept: CARDIOTHORACIC SURGERY | Facility: CLINIC | Age: 62
End: 2022-11-02
Payer: COMMERCIAL

## 2022-11-02 PROBLEM — J40 BRONCHITIS: Status: RESOLVED | Noted: 2021-07-12 | Resolved: 2022-11-02

## 2022-11-06 PROBLEM — Z95.3 S/P MITRAL VALVE REPLACEMENT WITH BIOPROSTHETIC VALVE: Status: ACTIVE | Noted: 2022-09-08

## 2022-11-07 PROBLEM — I34.0 NONRHEUMATIC MITRAL VALVE REGURGITATION: Status: RESOLVED | Noted: 2021-05-31 | Resolved: 2022-11-07

## 2022-11-07 PROBLEM — Z86.79 HISTORY OF MITRAL VALVE INSUFFICIENCY: Status: ACTIVE | Noted: 2022-11-07

## 2022-11-14 PROBLEM — J98.4 RESTRICTIVE LUNG DISEASE: Status: ACTIVE | Noted: 2022-11-14

## 2022-11-21 ENCOUNTER — LAB VISIT (OUTPATIENT)
Dept: LAB | Facility: HOSPITAL | Age: 62
End: 2022-11-21
Attending: INTERNAL MEDICINE
Payer: COMMERCIAL

## 2022-11-21 DIAGNOSIS — I50.20 HEART FAILURE WITH REDUCED EJECTION FRACTION, NYHA CLASS II: ICD-10-CM

## 2022-11-21 LAB
ANION GAP SERPL CALC-SCNC: 3 MMOL/L (ref 8–16)
BUN SERPL-MCNC: 14 MG/DL (ref 8–23)
CALCIUM SERPL-MCNC: 8.6 MG/DL (ref 8.7–10.5)
CHLORIDE SERPL-SCNC: 112 MMOL/L (ref 95–110)
CO2 SERPL-SCNC: 27 MMOL/L (ref 23–29)
CREAT SERPL-MCNC: 1 MG/DL (ref 0.5–1.4)
EST. GFR  (NO RACE VARIABLE): >60 ML/MIN/1.73 M^2
GLUCOSE SERPL-MCNC: 63 MG/DL (ref 70–110)
POTASSIUM SERPL-SCNC: 4 MMOL/L (ref 3.5–5.1)
SODIUM SERPL-SCNC: 142 MMOL/L (ref 136–145)

## 2022-11-21 PROCEDURE — 80048 BASIC METABOLIC PNL TOTAL CA: CPT | Performed by: INTERNAL MEDICINE

## 2022-11-21 PROCEDURE — 36415 COLL VENOUS BLD VENIPUNCTURE: CPT | Performed by: INTERNAL MEDICINE

## 2023-01-01 ENCOUNTER — HOSPITAL ENCOUNTER (EMERGENCY)
Facility: HOSPITAL | Age: 63
Discharge: HOME | End: 2023-01-01
Attending: EMERGENCY MEDICINE
Payer: COMMERCIAL

## 2023-01-01 VITALS
HEART RATE: 70 BPM | SYSTOLIC BLOOD PRESSURE: 138 MMHG | DIASTOLIC BLOOD PRESSURE: 91 MMHG | RESPIRATION RATE: 16 BRPM | HEIGHT: 71 IN | BODY MASS INDEX: 28.98 KG/M2 | WEIGHT: 207 LBS | OXYGEN SATURATION: 99 % | TEMPERATURE: 98 F

## 2023-01-01 DIAGNOSIS — R06.2 WHEEZING: ICD-10-CM

## 2023-01-01 DIAGNOSIS — R05.9 COUGH: Primary | ICD-10-CM

## 2023-01-01 DIAGNOSIS — J40 BRONCHITIS: ICD-10-CM

## 2023-01-01 LAB
CTP QC/QA: YES
CTP QC/QA: YES
POC MOLECULAR INFLUENZA A AGN: NEGATIVE
POC MOLECULAR INFLUENZA B AGN: NEGATIVE
SARS-COV-2 RDRP RESP QL NAA+PROBE: NEGATIVE

## 2023-01-01 PROCEDURE — 87635 SARS-COV-2 COVID-19 AMP PRB: CPT | Performed by: CLINICAL NURSE SPECIALIST

## 2023-01-01 PROCEDURE — 99284 EMERGENCY DEPT VISIT MOD MDM: CPT | Mod: 25

## 2023-01-01 PROCEDURE — 87502 INFLUENZA DNA AMP PROBE: CPT

## 2023-01-01 RX ORDER — ALBUTEROL SULFATE 90 UG/1
2 AEROSOL, METERED RESPIRATORY (INHALATION) EVERY 6 HOURS PRN
Qty: 18 G | Refills: 5 | Status: SHIPPED | OUTPATIENT
Start: 2023-01-01 | End: 2024-02-20

## 2023-01-01 RX ORDER — PROMETHAZINE HYDROCHLORIDE AND DEXTROMETHORPHAN HYDROBROMIDE 6.25; 15 MG/5ML; MG/5ML
5 SYRUP ORAL EVERY 4 HOURS PRN
Qty: 120 ML | Refills: 0 | Status: SHIPPED | OUTPATIENT
Start: 2023-01-01 | End: 2023-01-06

## 2023-01-01 RX ORDER — IBUPROFEN 800 MG/1
800 TABLET ORAL EVERY 6 HOURS PRN
Qty: 20 TABLET | Refills: 0 | Status: SHIPPED | OUTPATIENT
Start: 2023-01-01 | End: 2023-01-06

## 2023-01-01 RX ORDER — LEVOFLOXACIN 500 MG/1
500 TABLET, FILM COATED ORAL DAILY
Qty: 5 TABLET | Refills: 0 | Status: SHIPPED | OUTPATIENT
Start: 2023-01-01 | End: 2023-01-06

## 2023-01-01 NOTE — ED PROVIDER NOTES
Encounter Date: 1/1/2023       History     Chief Complaint   Patient presents with    Nasal Congestion     Nasal congestion, body aches onset Friday. Subjective fever. Denies n/v/d.      62-year-old male presents to the emergency room with nasal congestion, body aches with subjective fever since Friday.  Denies any nausea vomiting diarrhea.  History of bronchitis, COPD, diabetes    Review of patient's allergies indicates:  No Known Allergies  Past Medical History:   Diagnosis Date    Bronchitis     Cardiomyopathy 5/2/2016    40-45% by SIMONA    COPD (chronic obstructive pulmonary disease)     Diabetes mellitus     DM (diabetes mellitus), type 2 5/11/2016    Fatty liver     Heart failure with reduced ejection fraction, NYHA class II 7/9/2021    EF 40% in 04/2021 NYHA III; sleeps in recliner, PND, rare LE edema    History of DVT (deep vein thrombosis) 2/23/2021    Hyperlipidemia 5/2/2016    Hypertension     Nonrheumatic mitral valve regurgitation 5/31/2021    Severe    Pulmonary arterial hypertension 5/31/2021    Moderate    Suspected sleep apnea 5/2/2016     Past Surgical History:   Procedure Laterality Date    BIOPSY WITH TRANSRECTAL ULTRASOUND (TRUS) GUIDANCE N/A 2/2/2022    Procedure: BIOPSY, WITH TRANSRECTAL US GUIDANCE;  Surgeon: Thomas Ventura II, MD;  Location: [facility];  Service: Urology;  Laterality: N/A;  prostate     COLONOSCOPY N/A 7/5/2016    Procedure: COLONOSCOPY;  Surgeon: Faith Harris MD;  Location: [facility];  Service: Endoscopy;  Laterality: N/A;    COLONOSCOPY N/A 11/16/2021    Procedure: COLONOSCOPY;  Surgeon: Luis Bogran-Reyes, MD;  Location: [facility];  Service: Endoscopy;  Laterality: N/A;    CYSTOSCOPY N/A 2/2/2022    Procedure: CYSTOSCOPY;  Surgeon: Thomas Ventura II, MD;  Location: [facility];  Service: Urology;  Laterality: N/A;    LEFT HEART CATHETERIZATION Left 7/7/2022    Procedure: CATHETERIZATION, HEART, LEFT;  Surgeon: Quinn Duke MD;  Location: [facility] CATH LAB;  Service:  Cardiology;  Laterality: Left;    MITRAL VALVE REPLACEMENT N/A 2022    Procedure: REPLACEMENT, MITRAL VALVE;  Surgeon: Jadiel Andrew MD;  Location: [facility] OR [address] [place] [address];  Service: Cardiothoracic;  Laterality: N/A;    REPAIR, TRICUSPID VALVE, WITH RING INSERTION N/A 2022    Procedure: REPAIR, TRICUSPID VALVE, WITH RING INSERTION;  Surgeon: Jadiel Andrew MD;  Location: [facility] OR [address] [place] [address];  Service: Cardiothoracic;  Laterality: N/A;     Family History   Problem Relation Age of Onset    Diabetes Mother     Hypertension Mother     Prostate cancer Father      Social History     Tobacco Use    Smoking status: Former     Packs/day: 1.00     Years: 39.00     Pack years: 39.00     Types: Cigarettes     Start date: 1977     Quit date: 2021     Years since quittin.0    Smokeless tobacco: Never   Substance Use Topics    Alcohol use: No     Alcohol/week: 0.0 standard drinks    Drug use: No     Review of Systems   Constitutional:  Positive for fever.   HENT:  Positive for congestion. Negative for sore throat.    Respiratory:  Negative for shortness of breath.    Cardiovascular:  Negative for chest pain.   Gastrointestinal:  Negative for nausea.   Genitourinary:  Negative for dysuria.   Musculoskeletal:  Positive for arthralgias. Negative for back pain.   Skin:  Negative for rash.   Neurological:  Negative for weakness.   Hematological:  Does not bruise/bleed easily.   All other systems reviewed and are negative.    Physical Exam     Initial Vitals [23 1350]   BP Pulse Resp Temp SpO2   (!) 138/91 70 16 98.1 °F (36.7 °C) 99 %      MAP       --         Physical Exam    Nursing note and vitals reviewed.  Constitutional: He appears well-developed and well-nourished.   HENT:   Head: Normocephalic and atraumatic.   Eyes: Pupils are equal, round, and reactive to light.   Cardiovascular:  Normal rate and regular rhythm.           Pulmonary/Chest: Breath sounds normal.   Abdominal: Abdomen is soft. Bowel  sounds are normal.   Musculoskeletal:         General: Normal range of motion.     Neurological: He is alert and oriented to person, place, and time.   Psychiatric: He has a normal mood and affect.       ED Course   Procedures  Labs Reviewed   SARS-COV-2 RDRP GENE   POCT INFLUENZA A/B MOLECULAR          Imaging Results              X-Ray Chest AP Portable (Final result)  Result time 01/01/23 14:36:31      Final result by Michelle Gutierrez MD (01/01/23 14:36:31)                   Impression:      No acute findings      Electronically signed by: Michelle Gutierrez MD  Date:    01/01/2023  Time:    14:36               Narrative:    EXAMINATION:  XR CHEST AP PORTABLE    CLINICAL HISTORY:  Cough, unspecified    COMPARISON:  10/06/2022    FINDINGS:  Clear lungs.  No signs of CHF.    Prior valvuloplasty.  No change from prior                                       Medications - No data to display  Medical Decision Making:   Differential Diagnosis:   Flu, COVID pneumonia, bronchitis, COPD  Clinical Tests:   Lab Tests: Ordered and Reviewed  Radiological Study: Ordered and Reviewed                        Clinical Impression:   Final diagnoses:  [R05.9] Cough (Primary)  [J40] Bronchitis        ED Disposition Condition    Discharge Stable          ED Prescriptions       Medication Sig Dispense Start Date End Date Auth. Provider    albuterol (PROVENTIL/VENTOLIN HFA) 90 mcg/actuation inhaler Inhale 2 puffs into the lungs every 6 (six) hours as needed for Wheezing. Rescue 18 g 1/1/2023 1/1/2024 Amanda Damian NP    promethazine-dextromethorphan (PROMETHAZINE-DM) 6.25-15 mg/5 mL Syrp Take 5 mLs by mouth every 4 (four) hours as needed. 120 mL 1/1/2023 1/11/2023 Amanda Damian NP    ibuprofen (ADVIL,MOTRIN) 800 MG tablet Take 1 tablet (800 mg total) by mouth every 6 (six) hours as needed for Pain. 20 tablet 1/1/2023 -- Amanda Damian NP    levoFLOXacin (LEVAQUIN) 500 MG tablet Take 1 tablet (500 mg total) by mouth  once daily. for 5 days 5 tablet 1/1/2023 1/6/2023 Amanda Damian NP          Follow-up Information       Follow up With Specialties Details Why Contact Info    Marcin Farley MD Family Medicine  As needed [address] [facility]  [place] [address]  [phone]               Amanda Damian NP  01/01/23 1542

## 2023-03-07 ENCOUNTER — LAB VISIT (OUTPATIENT)
Dept: LAB | Facility: HOSPITAL | Age: 63
End: 2023-03-07
Attending: FAMILY MEDICINE
Payer: COMMERCIAL

## 2023-03-07 DIAGNOSIS — E11.9 TYPE 2 DIABETES MELLITUS WITHOUT COMPLICATION, WITHOUT LONG-TERM CURRENT USE OF INSULIN: ICD-10-CM

## 2023-03-07 DIAGNOSIS — I10 ESSENTIAL HYPERTENSION: ICD-10-CM

## 2023-03-07 LAB
ALBUMIN SERPL BCP-MCNC: 3.8 G/DL (ref 3.5–5.2)
ALP SERPL-CCNC: 78 U/L (ref 55–135)
ALT SERPL W/O P-5'-P-CCNC: 40 U/L (ref 10–44)
ANION GAP SERPL CALC-SCNC: 3 MMOL/L (ref 8–16)
AST SERPL-CCNC: 28 U/L (ref 10–40)
BASOPHILS # BLD AUTO: 0.03 K/UL (ref 0–0.2)
BASOPHILS NFR BLD: 0.5 % (ref 0–1.9)
BILIRUB SERPL-MCNC: 0.7 MG/DL (ref 0.1–1)
BUN SERPL-MCNC: 17 MG/DL (ref 8–23)
CALCIUM SERPL-MCNC: 8.5 MG/DL (ref 8.7–10.5)
CHLORIDE SERPL-SCNC: 114 MMOL/L (ref 95–110)
CO2 SERPL-SCNC: 26 MMOL/L (ref 23–29)
CREAT SERPL-MCNC: 1.2 MG/DL (ref 0.5–1.4)
DIFFERENTIAL METHOD: ABNORMAL
EOSINOPHIL # BLD AUTO: 0.2 K/UL (ref 0–0.5)
EOSINOPHIL NFR BLD: 4.2 % (ref 0–8)
ERYTHROCYTE [DISTWIDTH] IN BLOOD BY AUTOMATED COUNT: 13.6 % (ref 11.5–14.5)
EST. GFR  (NO RACE VARIABLE): >60 ML/MIN/1.73 M^2
ESTIMATED AVG GLUCOSE: 131 MG/DL (ref 68–131)
GLUCOSE SERPL-MCNC: 144 MG/DL (ref 70–110)
HBA1C MFR BLD: 6.2 % (ref 4–5.6)
HCT VFR BLD AUTO: 43.4 % (ref 40–54)
HGB BLD-MCNC: 14.1 G/DL (ref 14–18)
IMM GRANULOCYTES # BLD AUTO: 0.01 K/UL (ref 0–0.04)
IMM GRANULOCYTES NFR BLD AUTO: 0.2 % (ref 0–0.5)
LYMPHOCYTES # BLD AUTO: 1.9 K/UL (ref 1–4.8)
LYMPHOCYTES NFR BLD: 35 % (ref 18–48)
MCH RBC QN AUTO: 32 PG (ref 27–31)
MCHC RBC AUTO-ENTMCNC: 32.5 G/DL (ref 32–36)
MCV RBC AUTO: 98 FL (ref 82–98)
MONOCYTES # BLD AUTO: 0.6 K/UL (ref 0.3–1)
MONOCYTES NFR BLD: 10.8 % (ref 4–15)
NEUTROPHILS # BLD AUTO: 2.7 K/UL (ref 1.8–7.7)
NEUTROPHILS NFR BLD: 49.3 % (ref 38–73)
NRBC BLD-RTO: 0 /100 WBC
PLATELET # BLD AUTO: 189 K/UL (ref 150–450)
PMV BLD AUTO: 8.9 FL (ref 9.2–12.9)
POTASSIUM SERPL-SCNC: 4.4 MMOL/L (ref 3.5–5.1)
PROT SERPL-MCNC: 6.7 G/DL (ref 6–8.4)
RBC # BLD AUTO: 4.41 M/UL (ref 4.6–6.2)
SODIUM SERPL-SCNC: 143 MMOL/L (ref 136–145)
WBC # BLD AUTO: 5.48 K/UL (ref 3.9–12.7)

## 2023-03-07 PROCEDURE — 80053 COMPREHEN METABOLIC PANEL: CPT | Performed by: FAMILY MEDICINE

## 2023-03-07 PROCEDURE — 36415 COLL VENOUS BLD VENIPUNCTURE: CPT | Performed by: FAMILY MEDICINE

## 2023-03-07 PROCEDURE — 83036 HEMOGLOBIN GLYCOSYLATED A1C: CPT | Performed by: FAMILY MEDICINE

## 2023-03-07 PROCEDURE — 85025 COMPLETE CBC W/AUTO DIFF WBC: CPT | Performed by: FAMILY MEDICINE

## 2023-07-10 ENCOUNTER — PATIENT MESSAGE (OUTPATIENT)
Dept: ADMINISTRATIVE | Facility: HOSPITAL | Age: 63
End: 2023-07-10
Payer: COMMERCIAL

## 2023-08-23 PROBLEM — I47.29 NSVT (NONSUSTAINED VENTRICULAR TACHYCARDIA): Status: ACTIVE | Noted: 2023-08-23

## 2023-08-23 PROBLEM — I49.1 PAC (PREMATURE ATRIAL CONTRACTION): Status: ACTIVE | Noted: 2023-08-23

## 2023-08-23 PROBLEM — E11.9 CONTROLLED TYPE 2 DIABETES MELLITUS WITHOUT COMPLICATION, WITHOUT LONG-TERM CURRENT USE OF INSULIN: Status: ACTIVE | Noted: 2023-08-23

## 2023-08-23 PROBLEM — I49.3 PVC'S (PREMATURE VENTRICULAR CONTRACTIONS): Status: ACTIVE | Noted: 2023-08-23

## 2023-10-06 PROBLEM — Z91.89 MULTIPLE RISK FACTORS FOR CORONARY ARTERY DISEASE: Status: ACTIVE | Noted: 2023-10-06

## 2023-11-30 ENCOUNTER — CLINICAL SUPPORT (OUTPATIENT)
Dept: REHABILITATION | Facility: HOSPITAL | Age: 63
End: 2023-11-30
Attending: FAMILY MEDICINE
Payer: COMMERCIAL

## 2023-11-30 DIAGNOSIS — M25.511 CHRONIC RIGHT SHOULDER PAIN: ICD-10-CM

## 2023-11-30 DIAGNOSIS — G89.29 CHRONIC RIGHT SHOULDER PAIN: ICD-10-CM

## 2023-11-30 PROCEDURE — 97530 THERAPEUTIC ACTIVITIES: CPT

## 2023-11-30 PROCEDURE — 97166 OT EVAL MOD COMPLEX 45 MIN: CPT

## 2023-11-30 NOTE — PLAN OF CARE
Patient: Torsten Gordillo   [facility] #: [id number]  Date of evaluation: 11/30/2023     Referring Physician: Marcin Farley MD  Diagnosis:   Encounter Diagnosis   Name Primary?    Chronic right shoulder pain      Referral Orders: Eval and Treat  Age: 63 y.o.  Sex: male          Hand dominance: Right    Time In: 1:50  Time Out: 2:20    Occupation:   Working presently: Yes    Date of onset: ~ 6 months ago  Involved area: right shoulder  Mechanism of Injury: unknown    Past Medical History:   Diagnosis Date    Bronchitis     Cardiomyopathy 5/2/2016    40-45% by SIMONA    COPD (chronic obstructive pulmonary disease)     Diabetes mellitus     DM (diabetes mellitus), type 2 5/11/2016    Fatty liver     Heart failure with reduced ejection fraction, NYHA class II 7/9/2021    EF 40% in 04/2021 NYHA III; sleeps in recliner, PND, rare LE edema    History of DVT (deep vein thrombosis) 2/23/2021    Hyperlipidemia 5/2/2016    Hypertension     Nonrheumatic mitral valve regurgitation 5/31/2021    Severe    Pulmonary arterial hypertension 5/31/2021    Moderate    Suspected sleep apnea 5/2/2016     Precautions:   Current Outpatient Medications   Medication Sig    albuterol (PROVENTIL/VENTOLIN HFA) 90 mcg/actuation inhaler Inhale 2 puffs into the lungs every 6 (six) hours as needed for Wheezing. Rescue    aspirin (ECOTRIN) 81 MG EC tablet Take 81 mg by mouth once daily.    atorvastatin (LIPITOR) 80 MG tablet Take 1 tablet (80 mg total) by mouth every evening.    carvediloL (COREG) 25 MG tablet Take 1 tablet (25 mg total) by mouth 2 (two) times daily with meals.    empagliflozin (JARDIANCE) 10 mg tablet Take 1 tablet (10 mg total) by mouth once daily.    ezetimibe (ZETIA) 10 mg tablet Take 1 tablet (10 mg total) by mouth once daily.    finasteride (PROSCAR) 5 mg tablet Take 1 tablet (5 mg total) by mouth once daily.    fluticasone propionate (FLONASE) 50 mcg/actuation nasal spray 1 spray (50 mcg total) by Each Nostril route 2  (two) times a day.    furosemide (LASIX) 40 MG tablet Take 1 tablet (40 mg total) by mouth as needed (swelling, edema).    glimepiride (AMARYL) 4 MG tablet Take 1 tablet (4 mg total) by mouth once daily.    lancets Misc Test daily    meloxicam (MOBIC) 7.5 MG tablet Take 1 tablet (7.5 mg total) by mouth once daily.    sacubitriL-valsartan (ENTRESTO) 49-51 mg per tablet Take 1 tablet by mouth 2 (two) times daily.    spironolactone (ALDACTONE) 25 MG tablet Take 1 tablet (25 mg total) by mouth once daily.    sulfamethoxazole-trimethoprim 800-160mg (BACTRIM DS) 800-160 mg Tab Take 1 tablet by mouth 2 (two) times daily. for 14 days    tamsulosin (FLOMAX) 0.4 mg Cap Take 1 capsule (0.4 mg total) by mouth once daily.    tiZANidine (ZANAFLEX) 4 MG tablet Take 1 tablet (4 mg total) by mouth every 8 (eight) hours as needed (lower back pain or muscle spasm).     No current facility-administered medications for this visit.     Review of patient's allergies indicates:  No Known Allergies      Subjective:    Pain:  During no work: 1/10  While workin/10  Sleepin/10  Location of pain:    Torsten's goals for therapy are: decrease pain, increase strength, increase ROM, increase functional independence in ADLs.      Objective:  Sensation Test: Patient denies any numbness/tingling    Observation/Inspection   Left Right   Anatomic Symmetry normal normal   Bony normal normal   Suparspinatus normal normal   Infraspinatus normal normal   Rhomboid normal normal     Observation/Inspection:  normal muscle tone for age      Range of Motion:   Right: limited as follows: (see measurements table below)  Left: WNL     (R) UE     AROM Norm   Shoulder Flexion  0-180   Shoulder Flexion 95 180   Shoulder Abduction 85 0-180   Shoulder Extension 50 0-50   Shoulder Internal Rotation 90 0-90   Shoulder External Rotation 70 0-90     ROM Comments:   Pain at mid range     Strength  Shoulder Flexion RUE: 3-/5   Shoulder Extension RUE: 3-/5   Shoulder  Abduction RUE:  3-/5   Shoulder Adduction RUE:  3-/5   Internal Rotation RUE: 3-/5   External Rotation RUE: 3-/5   Horizontal Adduction RUE: 3-/5   Shoulder Flexion LUE: 5/5   Shoulder Extension LUE: 5/5   Shoulder Abduction LUE: 5/5   Shoulder Abbduction LUE: 5/5   Internal Rotation LUE:  5/5   External Rotation LUE:  5/5   Horizontal Adduction LUE:  5/5       Special Tests:  Positive: Neer Impingement and Empty can test  Negative: Drop Arm Test    Limitations of Functional Status:   Self Care: requires increase time to don clothes and reaching overhead  Work: Lifting, steering   Leisure: Fishing    Treatment included: OT evaluation, the following exercises (HEP) were instructed and Torsten was able to demonstrate them prior to the end of the session. HEP is uploaded in pt chart.        Assessment  This 63 y.o. male referred to Outpatient Occupational Therapy with diagnosis of   Encounter Diagnosis   Name Primary?    Chronic right shoulder pain     presents with limitations as described in problem list. Patient can benefit from Occupational Therapy services for moist heat, PROM, AAROM, AROM, Theraputic exercises, joint mobs, home exercise program provied with written instructions, ice, Ice massage, strengthening, and Theraband Ex. The following goals were discussed with the patient and he is in agreement with them as to be addressed in the treatment plan.     Problem List:   Decreased function of Right UE, Decreased ROM, Increased pain, Decreased strength, Muscular atrophy, Inability to perform work/tasks, Difficulty sleeping, Inability to perform leisure activiites, and Inability to perform self care tasks    STG to be met in 4 weeks:  1) Pt will be independent and report performing HEP to maximize (R) shoulder functional capacity.  2) Pt will increase R shoulder AROM in all measured planes of motion by 5-10 degrees with daily task.  3) Pt will report use of home modalities for pain management.  4) Pt will  report one degree less of pain with nonuse and with use.    LTG to be met in 8 weeks:  1) Pt will increase FOTO score to 68 to show improvements in completing ADLs and L UE use. (Currently 49)  2) Pt will increase R shoulder AROM to WFL  with daily task.  3) Pt will report no pain while completing work and ADLs      Plan  Torsten to be treated by Occupational Therapy 2 times per week for 8 weeks to achieve the established goals.   Treatment to include AAROM Mobilization of GH joint, PROM Home program, Ice, Moist heat, Strengthening Theraband Ex, UBE , and E- stim as well as any other treatments deemed necessary based on the patient's needs or progress.     Certification Dates: 11/30/2023 - 1/26/2024    I certify the need for these services furnished under this plan of treatment and while under my care    ____________________________________  Physician/Referring Practitioner    _______________  Date of Signature

## 2023-12-06 ENCOUNTER — CLINICAL SUPPORT (OUTPATIENT)
Dept: REHABILITATION | Facility: HOSPITAL | Age: 63
End: 2023-12-06
Payer: COMMERCIAL

## 2023-12-06 DIAGNOSIS — G89.29 CHRONIC RIGHT SHOULDER PAIN: Primary | ICD-10-CM

## 2023-12-06 DIAGNOSIS — M25.511 CHRONIC RIGHT SHOULDER PAIN: Primary | ICD-10-CM

## 2023-12-06 PROCEDURE — 97530 THERAPEUTIC ACTIVITIES: CPT

## 2023-12-06 NOTE — PROGRESS NOTES
Occupational Therapy Daily Treatment Note     Date: 12/6/2023  Name: Torsten Gordillo  MRN: [id number]    Diagnosis:   Encounter Diagnosis   Name Primary?    Chronic right shoulder pain Yes     Referring Physician: Marcin Farley MD    Evaluation Date: 11/30/2023  Plan of Care Certification Period: 11/30/2023 - 1/26/2024    Visit # / Visits authorized: 1 / 10  VANEGAS visit number: 0  Time In: 3:08  Time Out: 3:55  Total Billable Time: 47 minutes    Precautions:  Standard      Subjective     Pt reports: I'm ok  he was compliant with home exercise program given last session.     Pain: 4/10  Location: right shoulder      Objective     Treatment consist of the following:     Torsten received the following supervised modalities after being cleared for contradictions:   -None this day    Torsten participated in dynamic functional therapeutic activities to improve functional performance, including:  - Patient engaged in OH pulley in shoulder flexion and abduction ex to increase ROM in bilateral shoulders for increase indep with dressing, grooming, and bathing ADL tasks.     - Patient performed bilateral upper body ergonomic exercise for 2 sets x 5 min with no rest breaks to facilitate an increase in strength, functional mobility, range of motion, and endurance for increased indep, cardiopulmonary functions, and performance with activities of daily living.     - Wall Clocks 3 x10 with red theraband     - Shoulder ABCs with 2 pound DB     -Body blade x 30 min each    -External rotations 3 x 10 red theraband    -Patient engaged in bilateral upper extrimity exercise completing 3 sets x 10 reps of scapular ret/protraction, wrist flex/extension, and supination/pronation using blue Flexbar with seated to faciliate an increase in indep while completing activity of daily living tasks. Patient needed seated rest breaks b/w each set.     Home Exercises and Education Provided     Education provided:   - Role of occupational  therapy and occupational therapy POC  - Progress towards goals     Written Home Exercises Provided: Patient instructed to cont prior HEP.  Exercises were reviewed and Torsten was able to demonstrate them prior to the end of the session.  Torsten demonstrated good  understanding of the HEP provided.   .   See EMR under Media for exercises provided  11/30/2023 .        Assessment     Pt would continue to benefit from skilled OT. Patient tolerated therapy well this day with min complaints of increased pain. Patient presented with full active range of motion this day in RUE. Patient treatment progressed to strengthening this day due to full active range of motion. Patient tolerated increased resistance well without complaints of increased pain. Patient to continue to benefit from skilled occupational therapy to increase strength in RUE.     Torsten is progressing well towards his goals and there are no updates to goals at this time. Pt prognosis is Excellent.     Pt will continue to benefit from skilled OT intervention.    Patient continues to demonstrate limitation  with  ROM, Stiffness, Decreased functional use, Decreased strength, and Continued pain     Goals:     STG to be met in 4 weeks:  1) Pt will be independent and report performing HEP to maximize (R) shoulder functional capacity.  2) Pt will increase R shoulder AROM in all measured planes of motion by 5-10 degrees with daily task.  3) Pt will report use of home modalities for pain management.  4) Pt will report one degree less of pain with nonuse and with use.     LTG to be met in 8 weeks:  1) Pt will increase FOTO score to 68 to show improvements in completing ADLs and L UE use. (Currently 49)  2) Pt will increase R shoulder AROM to WFL  with daily task.  3) Pt will report no pain while completing work and ADLs          Plan     Torsten to be treated by Occupational Therapy 2 times per week for 8 weeks to achieve the established goals.   Treatment to  include AAROM Mobilization of GH joint, PROM Home program, Ice, Moist heat, Strengthening Theraband Ex, UBE , and E- stim as well as any other treatments deemed necessary based on the patient's needs or progress.      Certification Dates: 11/30/2023 - 1/26/2024    Updates/Grading for next session: Progress to omnicycle at next visit and progress as tolerated with increase resistances/reps.      Ancelmo Colbert, OTR/L

## 2023-12-08 ENCOUNTER — CLINICAL SUPPORT (OUTPATIENT)
Dept: REHABILITATION | Facility: HOSPITAL | Age: 63
End: 2023-12-08
Payer: COMMERCIAL

## 2023-12-08 DIAGNOSIS — G89.29 CHRONIC RIGHT SHOULDER PAIN: Primary | ICD-10-CM

## 2023-12-08 DIAGNOSIS — M25.511 CHRONIC RIGHT SHOULDER PAIN: Primary | ICD-10-CM

## 2023-12-08 PROCEDURE — 97110 THERAPEUTIC EXERCISES: CPT | Mod: CO

## 2023-12-08 PROCEDURE — 97112 NEUROMUSCULAR REEDUCATION: CPT | Mod: CO

## 2023-12-08 PROCEDURE — 97530 THERAPEUTIC ACTIVITIES: CPT | Mod: CO

## 2023-12-08 NOTE — PROGRESS NOTES
Occupational Therapy Daily Treatment Note     Date: 12/8/2023  Name: Torsten Gordillo  MRN: [id number]    Diagnosis:   Encounter Diagnosis   Name Primary?    Chronic right shoulder pain Yes     Referring Physician: Marcin Farley MD    Evaluation Date: 11/30/2023  Plan of Care Certification Period: 11/30/2023 - 1/26/2024    Visit # / Visits authorized: 2 / 10  VANEGAS visit number: 1  Time In: 2:45 PM  Time Out: 3:40 PM  Total Billable Time: 55 minutes    Precautions:  Standard      Subjective     Pt reports: I'm ok  he was compliant with home exercise program given last session.     Pain: 6/10  Location: right shoulder      Objective     Treatment consist of the following:     Torsten received the following supervised modalities after being cleared for contradictions:   -None this day    Torsten participated in dynamic functional therapeutic activities to improve functional performance, including:    - Patient performed bilateral upper body ergonomic exercise for 2 sets x 5 min with no rest breaks to facilitate an increase in strength, functional mobility, range of motion, and endurance for increased indep, cardiopulmonary functions, and performance with activities of daily living.     - Patient completed 2 sets x 10 reps performing finger ladder in shoulder flexion and abduction on incline slope to improve strength, range of motion, endurance, and functional mobility for increase indep while completing ADL/IADL tasks.     -Patient engaged in shoulder stability exercise (shoulder ABC's with 3lb DB, body blade 3 way, and wall clock's with blue theraband) to improve shoulder stability of the shoulder so big, powerful muscles such as the deltoid, trap, and lat can work effectively to keep everything in place, holding the head of the humerus into the  glenohumeral joint.      -Patient engaged in ER/IR walkouts with red theraband 2 x 10     -Patient engaged in bilateral upper extrimity exercise completing 3 sets x  10 reps of scapular ret/protraction, wrist flex/extension, and supination/pronation using blue Flexbar with seated to faciliate an increase in indep while completing activity of daily living tasks. Patient needed seated rest breaks b/w each set.     - Patient engaged in blue theraband exercise for 2 sets x 10 reps seated  in the following planes: shoulder flexion/extension, shoulder abduction/adduction, scapular retraction/protraction and scapular elevation/depression to facilitate an increase in strength, endurance, overall performance with activities of daily living.    Home Exercises and Education Provided     Education provided:   - Role of occupational therapy and occupational therapy POC  - Progress towards goals     Written Home Exercises Provided: Patient instructed to cont prior HEP.  Exercises were reviewed and Torsten was able to demonstrate them prior to the end of the session.  Torsten demonstrated good  understanding of the HEP provided.   .   See EMR under Media for exercises provided  11/30/2023 .        Assessment     Pt would continue to benefit from skilled occupational therapy intervention. Patient tolerated therapy well this day with min complaints of increased pain. Pt again this day with full range of motion. He was progressed to strengthening this day. Pt attempted red theraband and green theraband and reported it was easy. Patient tolerated increased resistance well without complaints of increased pain. Pt refused ice this day. Pt educated on use of cryotherapy if needed at home. Pt verbally understood. Occupational therapist /VANEGAS collaboration to discuss POC, improvements, and d/c planning on todays date.      Torsten is progressing well towards his goals and there are no updates to goals at this time. Pt prognosis is Excellent.     Pt will continue to benefit from skilled OT intervention.    Patient continues to demonstrate limitation  with  ROM, Stiffness, Decreased functional use,  Decreased strength, and Continued pain     Goals:     STG to be met in 4 weeks:  1) Pt will be independent and report performing HEP to maximize (R) shoulder functional capacity.  2) Pt will increase R shoulder AROM in all measured planes of motion by 5-10 degrees with daily task.  3) Pt will report use of home modalities for pain management.  4) Pt will report one degree less of pain with nonuse and with use.     LTG to be met in 8 weeks:  1) Pt will increase FOTO score to 68 to show improvements in completing ADLs and L UE use. (Currently 49)  2) Pt will increase R shoulder AROM to WFL  with daily task.  3) Pt will report no pain while completing work and ADLs          Plan     Torsten to be treated by Occupational Therapy 2 times per week for 8 weeks to achieve the established goals.   Treatment to include AAROM Mobilization of GH joint, PROM Home program, Ice, Moist heat, Strengthening Theraband Ex, UBE , and E- stim as well as any other treatments deemed necessary based on the patient's needs or progress.      Certification Dates: 11/30/2023 - 1/26/2024    Updates/Grading for next session: Progress to omnicycle at next visit and progress as tolerated with increase resistances/reps.      ROMINA Jimenez/ROSE MARY

## 2023-12-11 ENCOUNTER — CLINICAL SUPPORT (OUTPATIENT)
Dept: REHABILITATION | Facility: HOSPITAL | Age: 63
End: 2023-12-11
Payer: COMMERCIAL

## 2023-12-11 DIAGNOSIS — G89.29 CHRONIC RIGHT SHOULDER PAIN: Primary | ICD-10-CM

## 2023-12-11 DIAGNOSIS — M25.511 CHRONIC RIGHT SHOULDER PAIN: Primary | ICD-10-CM

## 2023-12-11 PROCEDURE — 97110 THERAPEUTIC EXERCISES: CPT | Mod: CO

## 2023-12-11 PROCEDURE — 97530 THERAPEUTIC ACTIVITIES: CPT | Mod: CO

## 2023-12-11 NOTE — PROGRESS NOTES
Occupational Therapy Daily Treatment Note     Date: 12/11/2023  Name: Torsten Gordillo  MRN: [id number]    Diagnosis:   Encounter Diagnosis   Name Primary?    Chronic right shoulder pain Yes     Referring Physician: Marcin Farley MD    Evaluation Date: 11/30/2023  Plan of Care Certification Period: 11/30/2023 - 1/26/2024    Visit # / Visits authorized: 3 / 10  VANEGAS visit number: 2  Time In: 2:58 PM  Time Out: 4:00 PM  Total Billable Time: 62 minutes    Precautions:  Standard      Subjective     Pt reports: I'm ok  he was compliant with home exercise program given last session.     Pain: 3/10  Location: right shoulder      Objective     Treatment consist of the following:     Torsten received the following supervised modalities after being cleared for contradictions:   -None this day    Torsten participated in dynamic functional therapeutic activities to improve functional performance, including:    - Patient performed bilateral upper body ergonomic exercise for 2 sets x 5 min with no rest breaks to facilitate an increase in strength, functional mobility, range of motion, and endurance for increased indep, cardiopulmonary functions, and performance with activities of daily living.     - Patient completed 2 sets x 10 reps performing finger ladder in shoulder flexion and abduction on incline slope to improve strength, range of motion, endurance, and functional mobility for increase indep while completing ADL/IADL tasks.     -Patient engaged in shoulder stability exercise (shoulder ABC's with 3lb DB, body blade 3 way, and wall clock's with blue theraband) to improve shoulder stability of the shoulder so big, powerful muscles such as the deltoid, trap, and lat can work effectively to keep everything in place, holding the head of the humerus into the  glenohumeral joint.      -Patient engaged in ER/IR walkouts with red theraband 2 x 10 (NOT TODAY)    -Patient engaged in bilateral upper extrimity exercise  completing 3 sets x 10 reps of scapular ret/protraction, wrist flex/extension, and supination/pronation using blue Flexbar with seated to faciliate an increase in indep while completing activity of daily living tasks. Patient needed seated rest breaks b/w each set.     - Patient engaged in 4lb DB exercise for 2 sets x 10 reps seated  in the following planes: shoulder flexion/extension, shoulder abduction/adduction, scapular retraction/protraction to facilitate an increase in strength, endurance, overall performance with activities of daily living.    Home Exercises and Education Provided     Education provided:   - Role of occupational therapy and occupational therapy POC  - Progress towards goals     Written Home Exercises Provided: Patient instructed to cont prior HEP.  Exercises were reviewed and Torsten was able to demonstrate them prior to the end of the session.  Torsten demonstrated good  understanding of the HEP provided.   .   See EMR under Media for exercises provided  11/30/2023 .        Assessment     Pt would continue to benefit from skilled occupational therapy intervention to return to PLOF. Patient tolerated therapy well this day with min complaints of increased pain. Pt again this day with full range of motion. He was progressed to strengthening this day. [place] through body blade, he reported he had 8/10 pain in flexion/extension with elbow straight and shoulder at 90 degrees; due to patient not c/o of pain with any other task, body blaze activity was terminated due to pain. Patient tolerated increased resistance well without complaints of increased pain. Pt refused ice this day. Pt educated on use of cryotherapy if needed at home. Pt verbally understood. Occupational therapist /VANEGAS collaboration to discuss POC, improvements, and d/c planning on todays date. Pt educated on the s/s and techniques to decrease pain with dx of arthritis per patient.     Torsten is progressing well towards his  goals and there are no updates to goals at this time. Pt prognosis is Excellent.     Pt will continue to benefit from skilled OT intervention.    Patient continues to demonstrate limitation  with  ROM, Stiffness, Decreased functional use, Decreased strength, and Continued pain     Goals:     STG to be met in 4 weeks:  1) Pt will be independent and report performing HEP to maximize (R) shoulder functional capacity.  2) Pt will increase R shoulder AROM in all measured planes of motion by 5-10 degrees with daily task.  3) Pt will report use of home modalities for pain management.  4) Pt will report one degree less of pain with nonuse and with use.     LTG to be met in 8 weeks:  1) Pt will increase FOTO score to 68 to show improvements in completing ADLs and L UE use. (Currently 49)  2) Pt will increase R shoulder AROM to WFL  with daily task.  3) Pt will report no pain while completing work and ADLs      Plan     Torsten to be treated by Occupational Therapy 2 times per week for 8 weeks to achieve the established goals.   Treatment to include AAROM Mobilization of GH joint, PROM Home program, Ice, Moist heat, Strengthening Theraband Ex, UBE , and E- stim as well as any other treatments deemed necessary based on the patient's needs or progress.      Certification Dates: 11/30/2023 - 1/26/2024    Updates/Grading for next session: Progress to omnicycle at next visit and progress as tolerated with increase resistances/reps.      ROMINA Jimenez/ROSE MARY

## 2023-12-15 ENCOUNTER — CLINICAL SUPPORT (OUTPATIENT)
Dept: REHABILITATION | Facility: HOSPITAL | Age: 63
End: 2023-12-15
Payer: COMMERCIAL

## 2023-12-15 DIAGNOSIS — G89.29 CHRONIC RIGHT SHOULDER PAIN: Primary | ICD-10-CM

## 2023-12-15 DIAGNOSIS — M25.511 CHRONIC RIGHT SHOULDER PAIN: Primary | ICD-10-CM

## 2023-12-15 PROCEDURE — 97530 THERAPEUTIC ACTIVITIES: CPT

## 2023-12-15 NOTE — PROGRESS NOTES
Occupational Therapy Daily Treatment Note     Date: 12/15/2023  Name: Torsten Gordillo  MRN: [id number]    Diagnosis:   Encounter Diagnosis   Name Primary?    Chronic right shoulder pain Yes       Referring Physician: Marcin Farley MD    Evaluation Date: 11/30/2023  Plan of Care Certification Period: 11/30/2023 - 1/26/2024    Visit # / Visits authorized: 3 / 10  VANEGAS visit number: 2  Time In: 3:10 PM  Time Out: 3:55 PM  Total Billable Time: 45 minutes    Precautions:  Standard      Subjective     Pt reports: I'm ok  he was compliant with home exercise program given last session.     Pain: 3/10  Location: right shoulder      Objective     Treatment consist of the following:     Torsten received the following supervised modalities after being cleared for contradictions:   -None this day    Torsten participated in dynamic functional therapeutic activities to improve functional performance, including:    - Patient performed bilateral upper body ergonomic exercise for 2 sets x 5 min with no rest breaks to facilitate an increase in strength, functional mobility, range of motion, and endurance for increased indep, cardiopulmonary functions, and performance with activities of daily living.     - Patient completed 2 sets x 10 reps performing finger ladder in shoulder flexion and abduction on incline slope to improve strength, range of motion, endurance, and functional mobility for increase indep while completing ADL/IADL tasks.     -Patient engaged in shoulder stability exercise (shoulder ABC's with 3lb DB, body blade 3 way, and wall clock's with blue theraband) to improve shoulder stability of the shoulder so big, powerful muscles such as the deltoid, trap, and lat can work effectively to keep everything in place, holding the head of the humerus into the  glenohumeral joint.      -Patient engaged in ER/IR walkouts with red theraband 2 x 10 (NOT TODAY)    -Patient engaged in bilateral upper extrimity exercise  completing 3 sets x 10 reps of scapular ret/protraction, wrist flex/extension, and supination/pronation using blue Flexbar with seated to faciliate an increase in indep while completing activity of daily living tasks. Patient needed seated rest breaks b/w each set.     - Patient engaged in 4lb DB exercise for 2 sets x 10 reps seated  in the following planes: shoulder flexion/extension, shoulder abduction/adduction, scapular retraction/protraction to facilitate an increase in strength, endurance, overall performance with activities of daily living.    Home Exercises and Education Provided     Education provided:   - Role of occupational therapy and occupational therapy POC  - Progress towards goals     Written Home Exercises Provided: Patient instructed to cont prior HEP.  Exercises were reviewed and Torsten was able to demonstrate them prior to the end of the session.  Torsten demonstrated good  understanding of the HEP provided.   .   See EMR under Media for exercises provided  11/30/2023 .        Assessment     Pt would continue to benefit from skilled occupational therapy intervention to return to PLOF. Patient tolerated therapy well this day with min complaints of increased pain during strengthening activity.  Pt refused ice this day. Pt educated on use of cryotherapy if needed at home. Pt verbally understood. Occupational therapist /VANEGAS collaboration to discuss POC, improvements, and d/c planning on todays date. Pt educated on the s/s and techniques to decrease pain with dx of arthritis per patient.     Torsten is progressing well towards his goals and there are no updates to goals at this time. Pt prognosis is Excellent.     Pt will continue to benefit from skilled OT intervention.    Patient continues to demonstrate limitation  with  ROM, Stiffness, Decreased functional use, Decreased strength, and Continued pain     Goals:     STG to be met in 4 weeks:  1) Pt will be independent and report performing  HEP to maximize (R) shoulder functional capacity.  2) Pt will increase R shoulder AROM in all measured planes of motion by 5-10 degrees with daily task.  3) Pt will report use of home modalities for pain management.  4) Pt will report one degree less of pain with nonuse and with use.     LTG to be met in 8 weeks:  1) Pt will increase FOTO score to 68 to show improvements in completing ADLs and L UE use. (Currently 49)  2) Pt will increase R shoulder AROM to WFL  with daily task.  3) Pt will report no pain while completing work and ADLs      Plan     Torsten to be treated by Occupational Therapy 2 times per week for 8 weeks to achieve the established goals.   Treatment to include AAROM Mobilization of GH joint, PROM Home program, Ice, Moist heat, Strengthening Theraband Ex, UBE , and E- stim as well as any other treatments deemed necessary based on the patient's needs or progress.      Certification Dates: 11/30/2023 - 1/26/2024    Updates/Grading for next session: Progress to omnicycle at next visit and progress as tolerated with increase resistances/reps.      Ancelmo Colbert, OT

## 2023-12-18 ENCOUNTER — PATIENT MESSAGE (OUTPATIENT)
Dept: ADMINISTRATIVE | Facility: HOSPITAL | Age: 63
End: 2023-12-18
Payer: COMMERCIAL

## 2023-12-18 ENCOUNTER — CLINICAL SUPPORT (OUTPATIENT)
Dept: REHABILITATION | Facility: HOSPITAL | Age: 63
End: 2023-12-18
Payer: COMMERCIAL

## 2023-12-18 DIAGNOSIS — M25.511 CHRONIC RIGHT SHOULDER PAIN: Primary | ICD-10-CM

## 2023-12-18 DIAGNOSIS — G89.29 CHRONIC RIGHT SHOULDER PAIN: Primary | ICD-10-CM

## 2023-12-18 PROCEDURE — 97530 THERAPEUTIC ACTIVITIES: CPT

## 2023-12-18 NOTE — PROGRESS NOTES
Occupational Therapy Daily Treatment Note     Date: 12/18/2023  Name: Torsten Gordillo  MRN: [id number]    Diagnosis:   Encounter Diagnosis   Name Primary?    Chronic right shoulder pain Yes       Referring Physician: Marcin Farley MD    Evaluation Date: 11/30/2023  Plan of Care Certification Period: 11/30/2023 - 1/26/2024    Visit # / Visits authorized: 5 / 10  VANEGAS visit number: 0  Time In: 3:05 PM  Time Out: 3:44 PM  Total Billable Time: 39 minutes    Precautions:  Standard      Subjective     Pt reports: I'm ok  he was compliant with home exercise program given last session.     Pain: 3/10  Location: right shoulder      Objective     Treatment consist of the following:     Torsten received the following supervised modalities after being cleared for contradictions:   -None this day    Torsten participated in dynamic functional therapeutic activities to improve functional performance, including:    - Patient performed bilateral upper body ergonomic exercise for 2 sets x 5 min with no rest breaks to facilitate an increase in strength, functional mobility, range of motion, and endurance for increased indep, cardiopulmonary functions, and performance with activities of daily living.     - Patient completed 2 sets x 10 reps performing finger ladder in shoulder flexion and abduction on incline slope to improve strength, range of motion, endurance, and functional mobility for increase indep while completing ADL/IADL tasks.     -Patient engaged in shoulder stability exercise (shoulder ABC's with 3lb DB, body blade 3 way, and wall clock's with blue theraband) to improve shoulder stability of the shoulder so big, powerful muscles such as the deltoid, trap, and lat can work effectively to keep everything in place, holding the head of the humerus into the  glenohumeral joint.      -Patient engaged in ER/IR walkouts with blue theraband 2 x 10    -Patient engaged in bilateral upper extrimity exercise completing 3 sets  x 10 reps of scapular ret/protraction, wrist flex/extension, and supination/pronation using blue Flexbar with seated to faciliate an increase in indep while completing activity of daily living tasks. Patient needed seated rest breaks b/w each set.     - Patient engaged in 4lb DB exercise for 2 sets x 10 reps seated  in the following planes: shoulder flexion/extension, shoulder abduction/adduction, scapular retraction/protraction to facilitate an increase in strength, endurance, overall performance with activities of daily living.    Home Exercises and Education Provided     Education provided:   - Role of occupational therapy and occupational therapy POC  - Progress towards goals     Written Home Exercises Provided: Patient instructed to cont prior HEP.  Exercises were reviewed and Torsten was able to demonstrate them prior to the end of the session.  Torsten demonstrated good  understanding of the HEP provided.   .   See EMR under Media for exercises provided  11/30/2023 .        Assessment     Pt would continue to benefit from skilled occupational therapy intervention to return to PLOF. Patient tolerated therapy well this day with min complaints of increased pain during strengthening activity. Pt was able to complete increased resistance with ER/IR walkouts today without complaints of increased pain. Pt refused ice this day. Pt educated on use of cryotherapy if needed at home. Pt verbally understood. Occupational therapist /VANEGAS collaboration to discuss POC, improvements, and d/c planning on todays date. Pt educated on the s/s and techniques to decrease pain with dx of arthritis per patient.     Torsten is progressing well towards his goals and there are no updates to goals at this time. Pt prognosis is Excellent.     Pt will continue to benefit from skilled OT intervention.    Patient continues to demonstrate limitation  with  ROM, Stiffness, Decreased functional use, Decreased strength, and Continued pain      Goals:     STG to be met in 4 weeks:  1) Pt will be independent and report performing HEP to maximize (R) shoulder functional capacity.  2) Pt will increase R shoulder AROM in all measured planes of motion by 5-10 degrees with daily task.  3) Pt will report use of home modalities for pain management.  4) Pt will report one degree less of pain with nonuse and with use.     LTG to be met in 8 weeks:  1) Pt will increase FOTO score to 68 to show improvements in completing ADLs and L UE use. (Currently 49)  2) Pt will increase R shoulder AROM to WFL  with daily task.  3) Pt will report no pain while completing work and ADLs      Plan     Torsten to be treated by Occupational Therapy 2 times per week for 8 weeks to achieve the established goals.   Treatment to include AAROM Mobilization of GH joint, PROM Home program, Ice, Moist heat, Strengthening Theraband Ex, UBE , and E- stim as well as any other treatments deemed necessary based on the patient's needs or progress.      Certification Dates: 11/30/2023 - 1/26/2024    Updates/Grading for next session: Progress to omnicycle at next visit and progress as tolerated with increase resistances/reps.      Ancelmo Colbert, OT

## 2023-12-22 ENCOUNTER — CLINICAL SUPPORT (OUTPATIENT)
Dept: REHABILITATION | Facility: HOSPITAL | Age: 63
End: 2023-12-22
Payer: COMMERCIAL

## 2023-12-22 DIAGNOSIS — M25.511 CHRONIC RIGHT SHOULDER PAIN: Primary | ICD-10-CM

## 2023-12-22 DIAGNOSIS — G89.29 CHRONIC RIGHT SHOULDER PAIN: Primary | ICD-10-CM

## 2023-12-22 PROCEDURE — 97530 THERAPEUTIC ACTIVITIES: CPT

## 2023-12-22 NOTE — PROGRESS NOTES
Occupational Therapy Daily Treatment Note     Date: 12/22/2023  Name: Torsten Gordillo  MRN: [id number]    Diagnosis:   Encounter Diagnosis   Name Primary?    Chronic right shoulder pain Yes       Referring Physician: Marcin Farley MD    Evaluation Date: 11/30/2023  Plan of Care Certification Period: 11/30/2023 - 1/26/2024    Visit # / Visits authorized: 5 / 10  VANEGAS visit number: 0  Time In: 3:00 PM  Time Out: 3:53 PM  Total Billable Time: 53 minutes    Precautions:  Standard      Subjective     Pt reports: I'm ok  he was compliant with home exercise program given last session.     Pain: 3/10  Location: right shoulder      Objective     Treatment consist of the following:     Torsten received the following supervised modalities after being cleared for contradictions:   -None this day    Torsten participated in dynamic functional therapeutic activities to improve functional performance, including:    - Patient performed bilateral upper body ergonomic exercise for 2 sets x 5 min with no rest breaks to facilitate an increase in strength, functional mobility, range of motion, and endurance for increased indep, cardiopulmonary functions, and performance with activities of daily living.     - Patient completed 2 sets x 10 reps performing finger ladder in shoulder flexion and abduction on incline slope to improve strength, range of motion, endurance, and functional mobility for increase indep while completing ADL/IADL tasks.     -Patient engaged in shoulder stability exercise (shoulder ABC's with 3lb DB, body blade 3 way, and wall clock's with blue theraband) to improve shoulder stability of the shoulder so big, powerful muscles such as the deltoid, trap, and lat can work effectively to keep everything in place, holding the head of the humerus into the  glenohumeral joint.      -Patient engaged in ER/IR walkouts with blue theraband 2 x 10    -Patient engaged in bilateral upper extrimity exercise completing 3 sets  x 10 reps of scapular ret/protraction, wrist flex/extension, and supination/pronation using blue Flexbar with seated to faciliate an increase in indep while completing activity of daily living tasks. Patient needed seated rest breaks b/w each set.     -Wall clocks with blue theraband on wall 3x10    - Patient engaged in 5lb DB exercise for 2 sets x 10 reps seated  in the following planes: shoulder flexion/extension, shoulder abduction/adduction, scapular retraction/protraction to facilitate an increase in strength, endurance, overall performance with activities of daily living.    Home Exercises and Education Provided     Education provided:   - Role of occupational therapy and occupational therapy POC  - Progress towards goals     Written Home Exercises Provided: Patient instructed to cont prior HEP.  Exercises were reviewed and Torsten was able to demonstrate them prior to the end of the session.  Torsten demonstrated good  understanding of the HEP provided.   .   See EMR under Media for exercises provided  11/30/2023 .        Assessment     Pt would continue to benefit from skilled occupational therapy intervention to return to PLOF. Patient tolerated therapy well this day with min complaints of increased pain during strengthening activity. He is able to complete all strengthening activity with min rest breaks between sets. Pt was able to complete increased resistance with DB activity today without complaints of increased pain.Occupational therapist /VANEGAS collaboration to discuss POC, improvements, and d/c planning on todays date. Pt educated on the s/s and techniques to decrease pain with dx of arthritis per patient.     Torsten is progressing well towards his goals and there are no updates to goals at this time. Pt prognosis is Excellent.     Pt will continue to benefit from skilled OT intervention.    Patient continues to demonstrate limitation  with  ROM, Stiffness, Decreased functional use, Decreased  strength, and Continued pain     Goals:     STG to be met in 4 weeks:  1) Pt will be independent and report performing HEP to maximize (R) shoulder functional capacity.  2) Pt will increase R shoulder AROM in all measured planes of motion by 5-10 degrees with daily task.  3) Pt will report use of home modalities for pain management.  4) Pt will report one degree less of pain with nonuse and with use.     LTG to be met in 8 weeks:  1) Pt will increase FOTO score to 68 to show improvements in completing ADLs and L UE use. (Currently 49)  2) Pt will increase R shoulder AROM to WFL  with daily task.  3) Pt will report no pain while completing work and ADLs      Plan     Torsten to be treated by Occupational Therapy 2 times per week for 8 weeks to achieve the established goals.   Treatment to include AAROM Mobilization of GH joint, PROM Home program, Ice, Moist heat, Strengthening Theraband Ex, UBE , and E- stim as well as any other treatments deemed necessary based on the patient's needs or progress.      Certification Dates: 11/30/2023 - 1/26/2024    Updates/Grading for next session: Progress to omnicycle at next visit and progress as tolerated with increase resistances/reps.      Ancelmo Colbert, OT

## 2023-12-27 ENCOUNTER — CLINICAL SUPPORT (OUTPATIENT)
Dept: REHABILITATION | Facility: HOSPITAL | Age: 63
End: 2023-12-27
Payer: COMMERCIAL

## 2023-12-27 DIAGNOSIS — M25.511 CHRONIC RIGHT SHOULDER PAIN: Primary | ICD-10-CM

## 2023-12-27 DIAGNOSIS — G89.29 CHRONIC RIGHT SHOULDER PAIN: Primary | ICD-10-CM

## 2023-12-27 PROCEDURE — 97530 THERAPEUTIC ACTIVITIES: CPT

## 2023-12-27 NOTE — PROGRESS NOTES
Occupational Therapy Daily Treatment Note     Date: 12/27/2023  Name: Torsten Gordillo  MRN: [id number]    Diagnosis:   Encounter Diagnosis   Name Primary?    Chronic right shoulder pain Yes         Referring Physician: Marcin Farley MD    Evaluation Date: 11/30/2023  Plan of Care Certification Period: 11/30/2023 - 1/26/2024    Visit # / Visits authorized: 7 / 10  VANEGAS visit number: 0  Time In: 3:00 PM  Time Out: 3:53 PM  Total Billable Time: 53 minutes    Precautions:  Standard      Subjective     Pt reports: I'm good  he was compliant with home exercise program given last session.     Pain: 1/10  Location: right shoulder      Objective     Treatment consist of the following:     Torsten received the following supervised modalities after being cleared for contradictions:   -None this day    Torsten participated in dynamic functional therapeutic activities to improve functional performance, including:    - Patient performed bilateral upper body ergonomic exercise for 2 sets x 5 min with no rest breaks to facilitate an increase in strength, functional mobility, range of motion, and endurance for increased indep, cardiopulmonary functions, and performance with activities of daily living.     - Patient completed 2 sets x 10 reps performing finger ladder in shoulder flexion and abduction on incline slope to improve strength, range of motion, endurance, and functional mobility for increase indep while completing ADL/IADL tasks.     -Patient engaged in shoulder stability exercise (shoulder ABC's with 4lb DB, body blade 3 way, and wall clock's with blue theraband) to improve shoulder stability of the shoulder so big, powerful muscles such as the deltoid, trap, and lat can work effectively to keep everything in place, holding the head of the humerus into the  glenohumeral joint.      -Patient engaged in ER/IR walkouts with blue theraband 2 x 10    -Patient engaged in bilateral upper extrimity exercise completing 3  sets x 10 reps of scapular ret/protraction, wrist flex/extension, and supination/pronation using blue Flexbar with seated to faciliate an increase in indep while completing activity of daily living tasks. Patient needed seated rest breaks b/w each set.     -Wall clocks with blue theraband on wall 3x10    - Patient engaged in 5lb DB exercise for 2 sets x 10 reps seated  in the following planes: shoulder flexion/extension, shoulder abduction/adduction, scapular retraction/protraction to facilitate an increase in strength, endurance, overall performance with activities of daily living.    Home Exercises and Education Provided     Education provided:   - Role of occupational therapy and occupational therapy POC  - Progress towards goals     Written Home Exercises Provided: Patient instructed to cont prior HEP.  Exercises were reviewed and Torsten was able to demonstrate them prior to the end of the session.  Torsten demonstrated good  understanding of the HEP provided.   .   See EMR under Media for exercises provided  11/30/2023 .        Assessment     Pt would continue to benefit from skilled occupational therapy intervention to return to PLOF. Patient tolerated therapy well this day with min complaints of increased pain during strengthening activity. He is able to complete all strengthening activity with min rest breaks between sets. Patient reporting increased pain with dumbbell activity. Occupational therapist /VANEGAS collaboration to discuss POC, improvements, and d/c planning on todays date.    Torsten is progressing well towards his goals and there are no updates to goals at this time. Pt prognosis is Excellent.     Pt will continue to benefit from skilled OT intervention.    Patient continues to demonstrate limitation  with  ROM, Stiffness, Decreased functional use, Decreased strength, and Continued pain     Goals:     STG to be met in 4 weeks:  1) Pt will be independent and report performing HEP to maximize  (R) shoulder functional capacity.  2) Pt will increase R shoulder AROM in all measured planes of motion by 5-10 degrees with daily task.  3) Pt will report use of home modalities for pain management.  4) Pt will report one degree less of pain with nonuse and with use.     LTG to be met in 8 weeks:  1) Pt will increase FOTO score to 68 to show improvements in completing ADLs and L UE use. (Currently 49)  2) Pt will increase R shoulder AROM to WFL  with daily task.  3) Pt will report no pain while completing work and ADLs      Plan     Torsten to be treated by Occupational Therapy 2 times per week for 8 weeks to achieve the established goals.   Treatment to include AAROM Mobilization of GH joint, PROM Home program, Ice, Moist heat, Strengthening Theraband Ex, UBE , and E- stim as well as any other treatments deemed necessary based on the patient's needs or progress.      Certification Dates: 11/30/2023 - 1/26/2024    Updates/Grading for next session: Progress to omnicycle at next visit and progress as tolerated with increase resistances/reps.      Ancelmo Colbert, OT

## 2023-12-29 ENCOUNTER — CLINICAL SUPPORT (OUTPATIENT)
Dept: REHABILITATION | Facility: HOSPITAL | Age: 63
End: 2023-12-29
Payer: COMMERCIAL

## 2023-12-29 DIAGNOSIS — M25.511 CHRONIC RIGHT SHOULDER PAIN: Primary | ICD-10-CM

## 2023-12-29 DIAGNOSIS — G89.29 CHRONIC RIGHT SHOULDER PAIN: Primary | ICD-10-CM

## 2023-12-29 PROCEDURE — 97530 THERAPEUTIC ACTIVITIES: CPT | Mod: CO

## 2023-12-29 PROCEDURE — 97110 THERAPEUTIC EXERCISES: CPT | Mod: CO

## 2023-12-29 NOTE — PROGRESS NOTES
Occupational Therapy Daily Treatment Note     Date: 12/29/2023  Name: Torsten Gordillo  MRN: [id number]    Diagnosis:   Encounter Diagnosis   Name Primary?    Chronic right shoulder pain Yes       Referring Physician: Marcin Farley MD    Evaluation Date: 11/30/2023  Plan of Care Certification Period: 11/30/2023 - 1/26/2024    Visit # / Visits authorized: 8 / 10  VANEGAS visit number: 1  Time In: 2:50 PM  Time Out: 3:50 PM  Total Billable Time: 60 minutes    Precautions:  Standard      Subjective     Pt reports: I'm okay, hurts a little more today. I think I worked it too hard.   he was compliant with home exercise program given last session.     Pain: 4/10  Location: right shoulder      Objective     Treatment consist of the following:     Torsten received the following supervised modalities after being cleared for contradictions:   -None this day    Torsten participated in dynamic functional therapeutic activities to improve functional performance, including:    - Patient performed bilateral upper body ergonomic exercise for 2 sets x 5 min with no rest breaks to facilitate an increase in strength, functional mobility, range of motion, and endurance for increased indep, cardiopulmonary functions, and performance with activities of daily living.     - Patient completed 2 sets x 10 reps performing finger ladder in shoulder flexion and abduction on incline slope to improve strength, range of motion, endurance, and functional mobility for increase indep while completing ADL/IADL tasks.     -Patient engaged in shoulder stability exercise (shoulder ABC's with 4lb DB, body blade 3 way 30 sec each direction) to improve shoulder stability of the shoulder so big, powerful muscles such as the deltoid, trap, and lat can work effectively to keep everything in place, holding the head of the humerus into the  glenohumeral joint.      -Patient engaged in ER/IR walkouts with blue theraband 2 x 10    -Patient engaged in  bilateral upper extrimity exercise completing 3 sets x 10 reps of scapular ret/protraction, wrist flex/extension, and supination/pronation using blue Flexbar with seated to faciliate an increase in indep while completing activity of daily living tasks. Patient needed seated rest breaks b/w each set.     -Wall clocks with blue theraband on wall 3x10    - Patient engaged in 5lb DB exercise for 2 sets x 10 reps seated  in the following planes: shoulder flexion/extension, shoulder abduction/adduction, scapular retraction/protraction to facilitate an increase in strength, endurance, overall performance with activities of daily living.    Home Exercises and Education Provided     Education provided:   - Role of occupational therapy and occupational therapy POC  - Progress towards goals     Written Home Exercises Provided: Patient instructed to cont prior HEP.  Exercises were reviewed and Torsten was able to demonstrate them prior to the end of the session.  Torsten demonstrated good  understanding of the HEP provided.   .   See EMR under Media for exercises provided  11/30/2023 .        Assessment     Pt would continue to benefit from skilled occupational therapy intervention to return to PLOF. Pt reported more pain than normal reporting he worked it too hard at home. Patient tolerated therapy well this day with min complaints of increased pain during strengthening activity. He is able to complete all strengthening activity with min rest breaks between sets. Pt to continue with strengthening activity next session. Occupational therapist /VANEGAS collaboration to discuss POC, improvements, and d/c planning on todays date.    Torsten is progressing well towards his goals and there are no updates to goals at this time. Pt prognosis is Excellent.     Pt will continue to benefit from skilled OT intervention.    Patient continues to demonstrate limitation  with  ROM, Stiffness, Decreased functional use, Decreased strength,  and Continued pain     Goals:     STG to be met in 4 weeks:  1) Pt will be independent and report performing HEP to maximize (R) shoulder functional capacity.  2) Pt will increase R shoulder AROM in all measured planes of motion by 5-10 degrees with daily task.  3) Pt will report use of home modalities for pain management.  4) Pt will report one degree less of pain with nonuse and with use.     LTG to be met in 8 weeks:  1) Pt will increase FOTO score to 68 to show improvements in completing ADLs and L UE use. (Currently 49)  2) Pt will increase R shoulder AROM to WFL  with daily task.  3) Pt will report no pain while completing work and ADLs      Plan     Torsten to be treated by Occupational Therapy 2 times per week for 8 weeks to achieve the established goals.   Treatment to include AAROM Mobilization of GH joint, PROM Home program, Ice, Moist heat, Strengthening Theraband Ex, UBE , and E- stim as well as any other treatments deemed necessary based on the patient's needs or progress.      Certification Dates: 11/30/2023 - 1/26/2024    Updates/Grading for next session: Progress to omnicycle at next visit and progress as tolerated with increase resistances/reps.      ROMINA Jimenez/ROSE MARY

## 2024-01-02 ENCOUNTER — DOCUMENTATION ONLY (OUTPATIENT)
Dept: REHABILITATION | Facility: HOSPITAL | Age: 64
End: 2024-01-02
Payer: COMMERCIAL

## 2024-01-02 NOTE — PROGRESS NOTES
Pt no call/ no show for apt this day. Called patient with unsuccessful call. Pt had no voice mail set up to leave message.     Latisha VANEGAS 1/2/23

## 2024-01-05 ENCOUNTER — CLINICAL SUPPORT (OUTPATIENT)
Dept: REHABILITATION | Facility: HOSPITAL | Age: 64
End: 2024-01-05
Payer: COMMERCIAL

## 2024-01-05 DIAGNOSIS — G89.29 CHRONIC RIGHT SHOULDER PAIN: Primary | ICD-10-CM

## 2024-01-05 DIAGNOSIS — M25.511 CHRONIC RIGHT SHOULDER PAIN: Primary | ICD-10-CM

## 2024-01-05 PROCEDURE — 97530 THERAPEUTIC ACTIVITIES: CPT

## 2024-01-05 NOTE — PROGRESS NOTES
Occupational Therapy Daily Treatment Note     Date: 1/5/2024  Name: Torsten Gordillo  MRN: [id number]    Diagnosis:   Encounter Diagnosis   Name Primary?    Chronic right shoulder pain Yes         Referring Physician: Marcin Farley MD     Evaluation Date: 11/30/2023  Plan of Care Certification Period: 11/30/2023 - 1/26/2024    Visit # / Visits authorized: 9 / 10  VANEGAS visit number: 0  Time In: 2:59 PM  Time Out: 3:53 PM  Total Billable Time: 54 minutes    Precautions:  Standard      Subjective     Pt reports: I'm okay. Had a rough day at work   he was compliant with home exercise program given last session.     Pain: 4/10  Location: right shoulder      Objective     Treatment consist of the following:     Torsten received the following supervised modalities after being cleared for contradictions:   -None this day    Torsten participated in dynamic functional therapeutic activities to improve functional performance, including:    - Patient performed bilateral upper body ergonomic exercise for 2 sets x 5 min with no rest breaks to facilitate an increase in strength, functional mobility, range of motion, and endurance for increased indep, cardiopulmonary functions, and performance with activities of daily living.     - Patient completed 2 sets x 10 reps performing finger ladder in shoulder flexion and abduction on incline slope to improve strength, range of motion, endurance, and functional mobility for increase indep while completing ADL/IADL tasks.     -Patient engaged in shoulder stability exercise (shoulder ABC's with 4lb DB, body blade 3 way 30 sec each direction) to improve shoulder stability of the shoulder so big, powerful muscles such as the deltoid, trap, and lat can work effectively to keep everything in place, holding the head of the humerus into the  glenohumeral joint.      -Patient engaged in ER/IR walkouts with blue theraband 2 x 10    -Patient engaged in bilateral upper extrimity exercise  completing 3 sets x 10 reps of scapular ret/protraction, wrist flex/extension, and supination/pronation using blue Flexbar with seated to faciliate an increase in indep while completing activity of daily living tasks. Patient needed seated rest breaks b/w each set.     -Wall clocks with blue theraband on wall 3x10    - Patient engaged in 5lb DB exercise for 2 sets x 10 reps seated  in the following planes: shoulder flexion/extension, shoulder abduction/adduction, scapular retraction/protraction to facilitate an increase in strength, endurance, overall performance with activities of daily living.    - Patient engaged in active range of motion therapeutic exercise for 2 x 15 with gray theraband in the following planes: shoulder flexion/extension, shoulder abduction/adduction    Home Exercises and Education Provided     Education provided:   - Role of occupational therapy and occupational therapy POC  - Progress towards goals     Written Home Exercises Provided: Patient instructed to cont prior HEP.  Exercises were reviewed and Torsten was able to demonstrate them prior to the end of the session.  Torsten demonstrated good  understanding of the HEP provided.   .   See EMR under Media for exercises provided  11/30/2023 .        Assessment     Pt would continue to benefit from skilled occupational therapy intervention to return to PLOF. Patient tolerated therapy well this day with min complaints of increased pain during strengthening activities. He is able to complete all strengthening activity with min rest breaks between sets. Pt requires min v/c for proper form and positioning with all activities. Occupational therapist /VANEGAS collaboration to discuss POC, improvements, and d/c planning on todays date.    Torsten is progressing well towards his goals and there are no updates to goals at this time. Pt prognosis is Excellent.     Pt will continue to benefit from skilled OT intervention.    Patient continues to  demonstrate limitation  with  ROM, Stiffness, Decreased functional use, Decreased strength, and Continued pain     Goals:     STG to be met in 4 weeks:  1) Pt will be independent and report performing HEP to maximize (R) shoulder functional capacity.  2) Pt will increase R shoulder AROM in all measured planes of motion by 5-10 degrees with daily task.  3) Pt will report use of home modalities for pain management.  4) Pt will report one degree less of pain with nonuse and with use.     LTG to be met in 8 weeks:  1) Pt will increase FOTO score to 68 to show improvements in completing ADLs and L UE use. (Currently 49)  2) Pt will increase R shoulder AROM to WFL  with daily task.  3) Pt will report no pain while completing work and ADLs      Plan     Torsten to be treated by Occupational Therapy 2 times per week for 8 weeks to achieve the established goals.   Treatment to include AAROM Mobilization of GH joint, PROM Home program, Ice, Moist heat, Strengthening Theraband Ex, UBE , and E- stim as well as any other treatments deemed necessary based on the patient's needs or progress.      Certification Dates: 11/30/2023 - 1/26/2024    Updates/Grading for next session: Progress to omnicycle at next visit and progress as tolerated with increase resistances/reps.      Ancelmo Colbert, OT

## 2024-01-09 ENCOUNTER — CLINICAL SUPPORT (OUTPATIENT)
Dept: REHABILITATION | Facility: HOSPITAL | Age: 64
End: 2024-01-09
Payer: COMMERCIAL

## 2024-01-09 DIAGNOSIS — G89.29 CHRONIC RIGHT SHOULDER PAIN: Primary | ICD-10-CM

## 2024-01-09 DIAGNOSIS — M25.511 CHRONIC RIGHT SHOULDER PAIN: Primary | ICD-10-CM

## 2024-01-09 PROCEDURE — 97112 NEUROMUSCULAR REEDUCATION: CPT | Mod: CO

## 2024-01-09 PROCEDURE — 97530 THERAPEUTIC ACTIVITIES: CPT | Mod: CO

## 2024-01-09 PROCEDURE — 97110 THERAPEUTIC EXERCISES: CPT | Mod: CO

## 2024-01-09 NOTE — PROGRESS NOTES
Occupational Therapy Daily Treatment Note     Date: 1/9/2024  Name: Torsten Gordillo  MRN: [id number]    Diagnosis:   Encounter Diagnosis   Name Primary?    Chronic right shoulder pain Yes     Referring Physician: Marcin Farley MD     Evaluation Date: 11/30/2023  Plan of Care Certification Period: 11/30/2023 - 1/26/2024    Visit # / Visits authorized:  10 / 10  VANEGAS visit number: 1  Time In: 2:55 PM  Time Out: 3:54 PM  Total Billable Time: 59 minutes    Precautions:  Standard      Subjective     Pt reports: I'm okay.   he was compliant with home exercise program given last session.     Pain: 0/10  Location: right shoulder      Objective     Range of Motion:   Right: limited as follows: (see measurements table below)  Left: WNL       (R) UE       AROM Norm   Shoulder Flexion   0-180   Shoulder Flexion 95 180 180   Shoulder Abduction 85 180 0-180   Shoulder Extension 50 50 0-50   Shoulder Internal Rotation 90 90 0-90   Shoulder External Rotation 70  89 0-90      ROM Comments:   Pain at mid range      Strength  Shoulder Flexion RUE: 3-/5  5/5   Shoulder Extension RUE: 3-/5  5/5   Shoulder Abduction RUE:  3-/5  5/5   Shoulder Adduction RUE:  3-/5  5/5   Internal Rotation RUE: 3-/5  5/5   External Rotation RUE: 3-/5  5/5   Horizontal Adduction RUE: 3-/5  5/5   Shoulder Flexion LUE: 5/5   5/5   Shoulder Extension LUE: 5/5   5/5   Shoulder Abduction LUE: 5/5  5/5   Shoulder Abbduction LUE: 5/5  5/5   Internal Rotation LUE:  5/5  5/5   External Rotation LUE:  5/5  5/5   Horizontal Adduction LUE:  5/5  5/5      Treatment consist of the following:     Torsten received the following supervised modalities after being cleared for contradictions:   -None this day    Torsten participated in dynamic functional therapeutic activities to improve functional performance, including:    - Patient performed bilateral omnicycle Level 8 exercise for 2 sets x 5 min with no rest breaks to facilitate an increase in strength,  functional mobility, range of motion, and endurance for increased indep, cardiopulmonary functions, and performance with activities of daily living.     - Patient completed 2 sets x 10 reps performing finger ladder in shoulder flexion and abduction on incline slope to improve strength, range of motion, endurance, and functional mobility for increase indep while completing ADL/IADL tasks.     -Patient engaged in shoulder stability exercise (shoulder ABC's with 4lb DB, body blade 3 way 30 sec each direction) to improve shoulder stability of the shoulder so big, powerful muscles such as the deltoid, trap, and lat can work effectively to keep everything in place, holding the head of the humerus into the  glenohumeral joint.      -Patient engaged in ER/IR walkouts with blue theraband 2 x 10    -Patient engaged in bilateral upper extrimity exercise completing 3 sets x 10 reps of scapular ret/protraction, wrist flex/extension, and supination/pronation using blue Flexbar with seated to faciliate an increase in indep while completing activity of daily living tasks. Patient needed seated rest breaks b/w each set.     -Wall clocks with blue theraband on wall 3x10    - Patient engaged in 4 lb DB exercise for 2 sets x 10 reps seated  in the following planes: shoulder flexion/extension, shoulder abduction/adduction, scapular retraction/protraction to facilitate an increase in strength, endurance, overall performance with activities of daily living.    - 12 pound ball lifts in shoulder flexion 2 x 15    Home Exercises and Education Provided     Education provided:   - Role of occupational therapy and occupational therapy POC  - Progress towards goals     Written Home Exercises Provided: Patient instructed to cont prior HEP.  Exercises were reviewed and Torsten was able to demonstrate them prior to the end of the session.  Torsten demonstrated good  understanding of the HEP provided.   .   See EMR under Media for exercises  provided  11/30/2023 .        Assessment     Pt would continue to benefit from skilled occupational therapy intervention to return to PLOF. Patient tolerated therapy well this day with no complaints of increased pain during strengthening activities. He is able to complete all strengthening activity with min rest breaks between sets. This is patients last visits; OTR will address measurements taken today. Occupational therapist /VANEGAS collaboration to discuss POC, improvements, and d/c planning on todays date.    Torsten is progressing well towards his goals and there are no updates to goals at this time. Pt prognosis is Excellent.     Pt will continue to benefit from skilled OT intervention.    Patient continues to demonstrate limitation  with  ROM, Stiffness, Decreased functional use, Decreased strength, and Continued pain     Goals:     STG to be met in 4 weeks:  1) Pt will be independent and report performing HEP to maximize (R) shoulder functional capacity.  2) Pt will increase R shoulder AROM in all measured planes of motion by 5-10 degrees with daily task.  3) Pt will report use of home modalities for pain management.  4) Pt will report one degree less of pain with nonuse and with use.     LTG to be met in 8 weeks:  1) Pt will increase FOTO score to 68 to show improvements in completing ADLs and L UE use. (Currently 49)  2) Pt will increase R shoulder AROM to WFL  with daily task.  3) Pt will report no pain while completing work and ADLs      Plan     Torsten to be treated by Occupational Therapy 2 times per week for 8 weeks to achieve the established goals.   Treatment to include AAROM Mobilization of GH joint, PROM Home program, Ice, Moist heat, Strengthening Theraband Ex, UBE , and E- stim as well as any other treatments deemed necessary based on the patient's needs or progress.      Certification Dates: 11/30/2023 - 1/26/2024    Updates/Grading for next session: Progress to omnicycle at next visit and  progress as tolerated with increase resistances/reps.      KEYANA JimenezA/L

## 2024-02-20 PROBLEM — Z01.818 PRE-OP EXAMINATION: Status: RESOLVED | Noted: 2022-07-27 | Resolved: 2024-02-20

## 2024-02-20 PROBLEM — N40.0 BPH (BENIGN PROSTATIC HYPERPLASIA): Status: ACTIVE | Noted: 2024-02-20

## 2024-03-06 ENCOUNTER — LAB VISIT (OUTPATIENT)
Dept: LAB | Facility: HOSPITAL | Age: 64
End: 2024-03-06
Attending: FAMILY MEDICINE
Payer: COMMERCIAL

## 2024-03-06 DIAGNOSIS — I10 ESSENTIAL HYPERTENSION: ICD-10-CM

## 2024-03-06 DIAGNOSIS — E78.2 MIXED HYPERLIPIDEMIA: ICD-10-CM

## 2024-03-06 DIAGNOSIS — E11.9 TYPE 2 DIABETES MELLITUS WITHOUT COMPLICATION, WITHOUT LONG-TERM CURRENT USE OF INSULIN: ICD-10-CM

## 2024-03-06 LAB
ALBUMIN SERPL BCP-MCNC: 3.8 G/DL (ref 3.5–5.2)
ALP SERPL-CCNC: 65 U/L (ref 55–135)
ALT SERPL W/O P-5'-P-CCNC: 52 U/L (ref 10–44)
ANION GAP SERPL CALC-SCNC: 1 MMOL/L (ref 3–11)
AST SERPL-CCNC: 28 U/L (ref 10–40)
BASOPHILS # BLD AUTO: 0.04 K/UL (ref 0–0.2)
BASOPHILS NFR BLD: 0.6 % (ref 0–1.9)
BILIRUB SERPL-MCNC: 1.5 MG/DL (ref 0.1–1)
BUN SERPL-MCNC: 20 MG/DL (ref 8–23)
CALCIUM SERPL-MCNC: 9.3 MG/DL (ref 8.7–10.5)
CHLORIDE SERPL-SCNC: 110 MMOL/L (ref 95–110)
CHOLEST SERPL-MCNC: 130 MG/DL (ref 120–199)
CHOLEST/HDLC SERPL: 2 {RATIO} (ref 2–5)
CO2 SERPL-SCNC: 29 MMOL/L (ref 23–29)
CREAT SERPL-MCNC: 1.1 MG/DL (ref 0.5–1.4)
DIFFERENTIAL METHOD BLD: ABNORMAL
EOSINOPHIL # BLD AUTO: 0.3 K/UL (ref 0–0.5)
EOSINOPHIL NFR BLD: 4.8 % (ref 0–8)
ERYTHROCYTE [DISTWIDTH] IN BLOOD BY AUTOMATED COUNT: 12.1 % (ref 11.5–14.5)
EST. GFR  (NO RACE VARIABLE): >60 ML/MIN/1.73 M^2
ESTIMATED AVG GLUCOSE: 131 MG/DL (ref 68–131)
GLUCOSE SERPL-MCNC: 86 MG/DL (ref 70–110)
HBA1C MFR BLD: 6.2 % (ref 4–5.6)
HCT VFR BLD AUTO: 48.5 % (ref 40–54)
HDLC SERPL-MCNC: 66 MG/DL (ref 40–75)
HDLC SERPL: 50.8 % (ref 20–50)
HGB BLD-MCNC: 16.4 G/DL (ref 14–18)
IMM GRANULOCYTES # BLD AUTO: 0.01 K/UL (ref 0–0.04)
IMM GRANULOCYTES NFR BLD AUTO: 0.2 % (ref 0–0.5)
LDLC SERPL CALC-MCNC: 42.4 MG/DL (ref 63–159)
LYMPHOCYTES # BLD AUTO: 2.2 K/UL (ref 1–4.8)
LYMPHOCYTES NFR BLD: 34.6 % (ref 18–48)
MCH RBC QN AUTO: 33 PG (ref 27–31)
MCHC RBC AUTO-ENTMCNC: 33.8 G/DL (ref 32–36)
MCV RBC AUTO: 98 FL (ref 82–98)
MONOCYTES # BLD AUTO: 0.6 K/UL (ref 0.3–1)
MONOCYTES NFR BLD: 9.6 % (ref 4–15)
NEUTROPHILS # BLD AUTO: 3.3 K/UL (ref 1.8–7.7)
NEUTROPHILS NFR BLD: 50.2 % (ref 38–73)
NONHDLC SERPL-MCNC: 64 MG/DL
NRBC BLD-RTO: 0 /100 WBC
PLATELET # BLD AUTO: 145 K/UL (ref 150–450)
PMV BLD AUTO: 9.4 FL (ref 9.2–12.9)
POTASSIUM SERPL-SCNC: 4.4 MMOL/L (ref 3.5–5.1)
PROT SERPL-MCNC: 7.4 G/DL (ref 6–8.4)
RBC # BLD AUTO: 4.97 M/UL (ref 4.6–6.2)
SODIUM SERPL-SCNC: 140 MMOL/L (ref 136–145)
TRIGL SERPL-MCNC: 108 MG/DL (ref 30–150)
WBC # BLD AUTO: 6.48 K/UL (ref 3.9–12.7)

## 2024-03-06 PROCEDURE — 83036 HEMOGLOBIN GLYCOSYLATED A1C: CPT | Performed by: FAMILY MEDICINE

## 2024-03-06 PROCEDURE — 80061 LIPID PANEL: CPT | Performed by: FAMILY MEDICINE

## 2024-03-06 PROCEDURE — 85025 COMPLETE CBC W/AUTO DIFF WBC: CPT | Performed by: FAMILY MEDICINE

## 2024-03-06 PROCEDURE — 80053 COMPREHEN METABOLIC PANEL: CPT | Performed by: FAMILY MEDICINE

## 2024-03-06 PROCEDURE — 36415 COLL VENOUS BLD VENIPUNCTURE: CPT | Performed by: FAMILY MEDICINE

## 2024-05-12 NOTE — PROGRESS NOTES
[facility] - [facility]iology [facility]  Cardiothoracic Surgery  Progress Note    Patient Name: Torsten Gordillo  MRN: [id number]  Admission Date: 9/8/2022  Hospital Length of Stay: 6 days  Code Status: Full Code   Attending Physician: Jadiel Andrew MD   Referring Provider: Jadiel Andrew MD  Principal Problem:S/P mitral valve replacement      Subjective:     Post-Op Info:  Procedure(s) (LRB):  REPLACEMENT, MITRAL VALVE (N/A)  REPAIR, TRICUSPID VALVE, WITH RING INSERTION (N/A)   6 Days Post-Op     Interval History: NAEON. Awaiting therapeutic INR. Goal 2.5-3.5. Ambulating without difficulty. No complaints this AM.     Review of Systems   Constitutional: Negative for malaise/fatigue.   Cardiovascular:  Negative for chest pain and dyspnea on exertion.   Respiratory:  Negative for shortness of breath.    Gastrointestinal:  Negative for abdominal pain.   Genitourinary:  Negative for dysuria.   Neurological:  Negative for weakness.   Medications:  Continuous Infusions:   heparin (porcine) in D5W 16 Units/kg/hr (09/13/22 1707)     Scheduled Meds:   acetaminophen  1,000 mg Oral Q8H    aspirin  81 mg Oral Daily    atorvastatin  80 mg Oral Daily    carvediloL  6.25 mg Oral BID    docusate sodium  100 mg Oral BID    finasteride  5 mg Oral Daily    furosemide (LASIX) injection  40 mg Intravenous BID    polyethylene glycol  17 g Oral Daily    warfarin  7.5 mg Oral Daily     PRN Meds:bisacodyL, dextrose 10%, dextrose 10%, heparin (PORCINE), heparin (PORCINE), metoclopramide HCl, ondansetron, oxyCODONE, oxyCODONE, sodium chloride 0.9%     Objective:     Vital Signs (Most Recent):  Temp: 98.3 °F (36.8 °C) (09/14/22 0820)  Pulse: (!) 59 (09/14/22 0820)  Resp: 18 (09/14/22 0820)  BP: 120/75 (09/14/22 0820)  SpO2: (!) 93 % (09/14/22 0820)   Vital Signs (24h Range):  Temp:  [97.1 °F (36.2 °C)-98.8 °F (37.1 °C)] 98.3 °F (36.8 °C)  Pulse:  [57-62] 59  Resp:  [16-22] 18  SpO2:  [93 %-100 %] 93 %  BP: ()/(51-75) 120/75  Mouth and Dental Injuries in Children   The Basics   Written by the doctors and editors at [facility]   What are mouth and dental injuries? -- Mouth and dental injuries happen when the lips, tongue, throat, or teeth get hurt during an accident or a fight (figure 1). Teeth can break, fall out, become loose, and cause pain.  How common are mouth and dental injuries in children? -- Very common. Close to half of all children hurt their mouths or teeth at some point. In most cases, children get better without problems. But mouth and tooth injuries can be serious. It's important to know when to see the doctor.  What causes mouth and dental injuries in children? -- The most common ways that children get mouth injuries are:  Falling  Playing sports  Fighting  Tripping or getting pushed with something in their mouth  Should I take my child to see a doctor or dentist? -- Call your doctor or dentist if:  The child is in a lot of pain or the area hurts when touched  The injured area is bothered by heat or cold  A tooth is broken, loose, or missing after the injury  There is a large cut in the mouth or on the face, or the area bleeds for more than 10 minutes, even with pressure on it  The child's jaw hurts when they open or close the mouth  The child has trouble swallowing or breathing  An object is stuck on the tongue, cheek, or roof of the mouth, or in the throat  Something might have pierced the back of the throat  The child is weak or numb, is slurring their words, or can't see well  There are signs of infection, such as fever, redness, or pain that gets worse over time  Is there anything I can do on my own to help my child after a mouth or dental injury? -- Yes. You can:  Press on the area with a clean cloth or gauze, if there is bleeding. Hold the pressure for 10 minutes.  Give your child pain medicine, such as ibuprofen (sample brand names: Advil, Motrin) or acetaminophen (sample brand name: Tylenol)  What tests should my      Weight: 89.4 kg (197 lb 1.5 oz)  Body mass index is 27.49 kg/m².    SpO2: (!) 93 %  O2 Device (Oxygen Therapy): room air    Intake/Output - Last 3 Shifts         09/12 0700 09/13 0659 09/13 0700 09/14 0659 09/14 0700  09/15 0659    P.O. 1178 940     I.V. (mL/kg) 690.2 (7.7) 103.8 (1.2)     IV Piggyback       Total Intake(mL/kg) 1868.2 (20.8) 1043.8 (11.6)     Urine (mL/kg/hr) 2300 (1.1) 2100 (1)     Total Output 2300 2100     Net -431.8 -1056.2                    Lines/Drains/Airways       Peripheral Intravenous Line  Duration                  Peripheral IV - Single Lumen 09/08/22 0549 18 G Anterior;Right Forearm 6 days         Peripheral IV - Single Lumen 09/10/22 1100 18 G Anterior;Left Forearm 3 days                    Physical Exam  Constitutional:       General: He is not in acute distress.  HENT:      Head: Normocephalic and atraumatic.   Cardiovascular:      Rate and Rhythm: Normal rate and regular rhythm.   Pulmonary:      Effort: Pulmonary effort is normal. No respiratory distress.   Abdominal:      General: There is no distension.   Musculoskeletal:         General: No swelling. Normal range of motion.      Cervical back: Normal range of motion.   Skin:     Coloration: Skin is not pale.      Comments: Midline sternal incision c/d/I  Chest tube sites healing well    Neurological:      Mental Status: He is alert. Mental status is at baseline.   Psychiatric:         Mood and Affect: Mood normal.         Behavior: Behavior normal.       Significant Labs:  BMP:   Recent Labs   Lab 09/14/22  0632         K 4.4      CO2 27   BUN 14   CREATININE 1.0   CALCIUM 9.3   MG 2.1     Cardiac markers: No results for input(s): CKMB, CPKMB, TROPONINT, TROPONINI, MYOGLOBIN in the last 48 hours.  CBC:   Recent Labs   Lab 09/14/22  0632   WBC 9.90   RBC 3.27*   HGB 10.7*   HCT 32.0*      MCV 98   MCH 32.7*   MCHC 33.4     CMP:   Recent Labs   Lab 09/14/22  0632      CALCIUM 9.3   ALBUMIN  child have? -- Your doctor or dentist will decide which tests your child should have, based on their symptoms and individual situation. Often all the doctor or dentist needs to do is examine the child and ask how the injury happened. In some cases, an X-ray or other imaging test might be needed to check for broken bones, tooth damage, or swallowed teeth.  Will my child need treatment for a tooth injury? -- That depends on where the injury is, how severe it is, and how old your child is. Your child might not need any treatment. For tooth injuries, treatment depends on whether the injury is in a baby tooth or an adult tooth.  Adult teeth usually start to come in after age 6 or 7. They look different than baby teeth. If you and your child's doctor are not sure which type of tooth is hurt, an X-ray can help.   How are injuries to baby teeth treated? -- That depends on the type of injury. The dentist might repair the tooth, remove it, or do nothing. If a baby tooth is removed or falls out, the adult tooth usually grows in just fine.  How are injuries to adult teeth treated in children? -- That depends on the type of injury:  If one of your child's adult teeth is loose, the dentist might push it back in place. To hold it there while it heals, they might use stitches or a splint.  If one of your child's adult teeth breaks, the dentist might:  Put broken pieces back on (if you find any pieces, you can put them in tap water to keep them from drying out)  Fix the tooth with a material that looks like teeth  If one of your child's adult teeth falls out, act fast. You should put the tooth back in its socket (the hole in the gums) as soon as possible. The tooth is most likely to survive if it goes back in within 15 minutes.  Here are some tips for putting a tooth back in its socket:  Hold it on the end that normally sticks out  Gently rinse the tooth socket with tap water  Put the tooth back in its socket with your fingers  Have  2.7*   PROT 6.5      K 4.4   CO2 27      BUN 14   CREATININE 1.0   ALKPHOS 150*   ALT 27   AST 31   BILITOT 0.8     Coagulation:   Recent Labs   Lab 09/14/22  0632   INR 1.7*   APTT 56.6*       Significant Diagnostics:  I have reviewed all pertinent imaging results/findings within the past 24 hours.    Assessment/Plan:     * S/P mitral valve replacement  - Bioprosthetic. Coumadin for 3 months. INR goal 2.5-3.5  - ASA 81  - Lasix and K   - Coreg   - Scheduled Tylenol   - Statin   - Bowel regimen   - INR 1.7. Will give coumadin 7.5 tonight (4,5,5)   - PTT goal 60-80. Heparin gtt increased from 16units/kg/hr to 17  - PT/OT  - Chest tube sites cleansed with betadine and new dressing placed     S/P tricuspid valve repair  - See S/P MVR     Prostate CA  - follows with oncology   - finasteride     Mucopurulent chronic bronchitis  - On room air     Heart failure with reduced ejection fraction, NYHA class II  - Coreg     Controlled type 2 diabetes mellitus, without long-term current use of insulin  - endocrine following     Hyperlipidemia  - statin     Essential hypertension  - BP stable only on Coreg     Dispo: CSU. D/C when INR therapeutic     Marti Patino PA-C  Cardiothoracic Surgery  [facility] - [facility]   the child bite on a towel to hold the tooth in place  Get to a dentist right away  If you must wait to put the tooth back in, you can put it in cold milk or the child's saliva. If possible, it is even better to put the tooth in a special liquid for knocked-out teeth (sample brand names: ViaSpan, Matte[employer]'s Balanced Salt Solution). Then, get the tooth in its socket within an hour.  How are injuries to other parts of the mouth treated in children? -- That depends on where the injury is, what kind of injury it is, and how bad it is. Your child might not need any treatment. In some cases, your child might need:  Stitches to fix a cut on the tongue or lips  Surgery, if your child pierces the back of their throat with an object and it is still stuck in the throat. This can happen if your child falls with something in their mouth, such as a pencil, toothbrush, lollipop, or straw.  Antibiotics to prevent or treat some infections  A tetanus shot to prevent serious disease  Can mouth and dental injuries be prevented in children? -- Your child can reduce their chances of getting mouth or dental injuries by:  Wearing a mouth guard during sports. The best type of mouth guard is made by a dentist just for your child.  Sitting while eating. This is most important when eating something on a stick or with a straw.  Putting only food and drinks in their mouth  Not eating in the car  All topics are updated as new evidence becomes available and our peer review process is complete.  This topic retrieved from [employer] on: Sep 21, 2021.  Topic 38472 Version 7.0  Release: 29.4.2 - C29.263  © 2021 UpToDate, Inc. and/or its affiliates. All rights reserved.  figure 1: Inside the mouth     These are the different parts of the mouth.  Graphic 21504 Version 2.0     Consumer Information Use and Disclaimer   This information is not specific medical advice and does not replace information you receive from your health care provider. This is only a brief  summary of general information. It does NOT include all information about conditions, illnesses, injuries, tests, procedures, treatments, therapies, discharge instructions or life-style choices that may apply to you. You must talk with your health care provider for complete information about your health and treatment options. This information should not be used to decide whether or not to accept your health care provider's advice, instructions or recommendations. Only your health care provider has the knowledge and training to provide advice that is right for you. The use of this information is governed by the [employer] End User License Agreement, available at https://www.[employer]/en/solutions/Unitask/about/aga.The use of [employer] content is governed by the [employer] Terms of Use. ©2021 [employer] All rights reserved.  Copyright   © 2021 UpToDate, Inc. and/or its affiliates. All rights reserved.

## 2024-07-08 ENCOUNTER — LAB VISIT (OUTPATIENT)
Dept: LAB | Facility: HOSPITAL | Age: 64
End: 2024-07-08
Attending: FAMILY MEDICINE
Payer: COMMERCIAL

## 2024-07-08 DIAGNOSIS — Z79.4 TYPE 2 DIABETES MELLITUS WITHOUT COMPLICATION, WITH LONG-TERM CURRENT USE OF INSULIN: ICD-10-CM

## 2024-07-08 DIAGNOSIS — I10 ESSENTIAL HYPERTENSION: ICD-10-CM

## 2024-07-08 DIAGNOSIS — E11.9 TYPE 2 DIABETES MELLITUS WITHOUT COMPLICATION, WITH LONG-TERM CURRENT USE OF INSULIN: ICD-10-CM

## 2024-07-08 LAB
ALBUMIN SERPL BCP-MCNC: 3.8 G/DL (ref 3.5–5.2)
ALP SERPL-CCNC: 63 U/L (ref 55–135)
ALT SERPL W/O P-5'-P-CCNC: 26 U/L (ref 10–44)
ANION GAP SERPL CALC-SCNC: 8 MMOL/L (ref 3–11)
AST SERPL-CCNC: 18 U/L (ref 10–40)
BASOPHILS # BLD AUTO: 0.04 K/UL (ref 0–0.2)
BASOPHILS NFR BLD: 0.6 % (ref 0–1.9)
BILIRUB SERPL-MCNC: 1 MG/DL (ref 0.1–1)
BUN SERPL-MCNC: 15 MG/DL (ref 8–23)
CALCIUM SERPL-MCNC: 9 MG/DL (ref 8.7–10.5)
CHLORIDE SERPL-SCNC: 106 MMOL/L (ref 95–110)
CO2 SERPL-SCNC: 27 MMOL/L (ref 23–29)
CREAT SERPL-MCNC: 1.1 MG/DL (ref 0.5–1.4)
DIFFERENTIAL METHOD BLD: ABNORMAL
EOSINOPHIL # BLD AUTO: 0.2 K/UL (ref 0–0.5)
EOSINOPHIL NFR BLD: 2.9 % (ref 0–8)
ERYTHROCYTE [DISTWIDTH] IN BLOOD BY AUTOMATED COUNT: 13.1 % (ref 11.5–14.5)
EST. GFR  (NO RACE VARIABLE): >60 ML/MIN/1.73 M^2
ESTIMATED AVG GLUCOSE: 131 MG/DL (ref 68–131)
GLUCOSE SERPL-MCNC: 137 MG/DL (ref 70–110)
HBA1C MFR BLD: 6.2 % (ref 4–5.6)
HCT VFR BLD AUTO: 43.4 % (ref 40–54)
HGB BLD-MCNC: 15 G/DL (ref 14–18)
IMM GRANULOCYTES # BLD AUTO: 0.03 K/UL (ref 0–0.04)
IMM GRANULOCYTES NFR BLD AUTO: 0.4 % (ref 0–0.5)
LYMPHOCYTES # BLD AUTO: 2.1 K/UL (ref 1–4.8)
LYMPHOCYTES NFR BLD: 30.4 % (ref 18–48)
MCH RBC QN AUTO: 33.6 PG (ref 27–31)
MCHC RBC AUTO-ENTMCNC: 34.6 G/DL (ref 32–36)
MCV RBC AUTO: 97 FL (ref 82–98)
MONOCYTES # BLD AUTO: 0.8 K/UL (ref 0.3–1)
MONOCYTES NFR BLD: 11.3 % (ref 4–15)
NEUTROPHILS # BLD AUTO: 3.8 K/UL (ref 1.8–7.7)
NEUTROPHILS NFR BLD: 54.4 % (ref 38–73)
NRBC BLD-RTO: 0 /100 WBC
PLATELET # BLD AUTO: 161 K/UL (ref 150–450)
PMV BLD AUTO: 8.8 FL (ref 9.2–12.9)
POTASSIUM SERPL-SCNC: 3.9 MMOL/L (ref 3.5–5.1)
PROT SERPL-MCNC: 7.1 G/DL (ref 6–8.4)
RBC # BLD AUTO: 4.46 M/UL (ref 4.6–6.2)
SODIUM SERPL-SCNC: 141 MMOL/L (ref 136–145)
WBC # BLD AUTO: 6.91 K/UL (ref 3.9–12.7)

## 2024-07-08 PROCEDURE — 85025 COMPLETE CBC W/AUTO DIFF WBC: CPT | Performed by: FAMILY MEDICINE

## 2024-07-08 PROCEDURE — 83036 HEMOGLOBIN GLYCOSYLATED A1C: CPT | Performed by: FAMILY MEDICINE

## 2024-07-08 PROCEDURE — 80053 COMPREHEN METABOLIC PANEL: CPT | Performed by: FAMILY MEDICINE

## 2024-07-08 PROCEDURE — 36415 COLL VENOUS BLD VENIPUNCTURE: CPT | Performed by: FAMILY MEDICINE

## 2024-08-15 PROBLEM — N17.9 AKI (ACUTE KIDNEY INJURY): Status: ACTIVE | Noted: 2024-08-15

## 2024-08-15 PROBLEM — I21.4 NSTEMI (NON-ST ELEVATED MYOCARDIAL INFARCTION): Status: ACTIVE | Noted: 2024-08-15

## 2024-08-19 PROBLEM — N17.9 AKI (ACUTE KIDNEY INJURY): Status: RESOLVED | Noted: 2024-08-15 | Resolved: 2024-08-19

## 2024-08-19 PROBLEM — I21.4 NSTEMI (NON-ST ELEVATED MYOCARDIAL INFARCTION): Status: RESOLVED | Noted: 2024-08-15 | Resolved: 2024-08-19

## 2024-10-29 PROBLEM — Z95.810 USES LIFEVEST DEFIBRILLATOR: Status: ACTIVE | Noted: 2024-10-29

## 2024-11-06 DIAGNOSIS — E11.9 TYPE 2 DIABETES MELLITUS WITHOUT COMPLICATION: ICD-10-CM

## 2025-05-12 ENCOUNTER — PATIENT MESSAGE (OUTPATIENT)
Dept: ADMINISTRATIVE | Facility: HOSPITAL | Age: 65
End: 2025-05-12
Payer: COMMERCIAL

## 2025-05-28 PROBLEM — I50.23 ACUTE ON CHRONIC SYSTOLIC HEART FAILURE: Status: ACTIVE | Noted: 2025-05-28

## (undated) DEVICE — DRAIN CHANNEL ROUND 19FR

## (undated) DEVICE — SUT SILK BLK BR. 2 2-60

## (undated) DEVICE — FOGERTY SOFT JAW DISP 2/PK

## (undated) DEVICE — BOWL STERILE LARGE 32OZ

## (undated) DEVICE — INSERTS STEALTH FIBRA SIZE 5

## (undated) DEVICE — SUT PROLENE 3-0 SH DA 36 BL

## (undated) DEVICE — DRAPE CVMAX SPLIT ANES SCRN

## (undated) DEVICE — SUT PROLENE 5-0 24 C-1 BL

## (undated) DEVICE — NDL 22GA X1 1/2 REG BEVEL

## (undated) DEVICE — LOOP VESSEL BLUE MAXI

## (undated) DEVICE — KIT SAHARA DRAPE DRAW/LIFT

## (undated) DEVICE — CLOSURE SKIN STERI STRIP 1/2X4

## (undated) DEVICE — SUT VICRYL PLUS 3-0 FS1 27

## (undated) DEVICE — GLOVE BIOGEL PI MICRO SZ 7.5

## (undated) DEVICE — BLADE SAW STERNAL 5/BX

## (undated) DEVICE — SUT PROLENE 4-0 SH BLU 36IN

## (undated) DEVICE — SUT ETHIBOND XTRA 2-0 SH-2

## (undated) DEVICE — DRESSING AQUACEL SACRAL 9 X 9

## (undated) DEVICE — TRAY HEART OMC

## (undated) DEVICE — DRAPE INCISE IOBAN 2 23X17IN

## (undated) DEVICE — COVER MEDIPORE DRSNG 15X28CM

## (undated) DEVICE — SYR 30CC LUER LOCK

## (undated) DEVICE — SUT 6 18IN STEEL MONO CCS

## (undated) DEVICE — CANNULA RETROGRADE CARDIOPLEG

## (undated) DEVICE — DRESSING ADH ISLAND 3.6 X 14

## (undated) DEVICE — SUT SILK 2-0 SH 18IN BLACK

## (undated) DEVICE — SOL 9P NACL IRR PIC IL

## (undated) DEVICE — DRAPE SLUSH WARMER WITH DISC

## (undated) DEVICE — SUT VICRYL BR 1 GEN 27 CT-1

## (undated) DEVICE — CANNULA MULTIPLE PERFUSIONSET

## (undated) DEVICE — TIP YANKAUERS BULB NO VENT

## (undated) DEVICE — KIT URINARY CATH URINE METER

## (undated) DEVICE — DRAIN CHEST DRY SUCTION

## (undated) DEVICE — SUT PROLENE 4-0 RB-1 BL MO

## (undated) DEVICE — Device

## (undated) DEVICE — GAUZE SPONGE 4X4 12PLY

## (undated) DEVICE — CONTAINER SPECIMEN STRL 4OZ

## (undated) DEVICE — SUT 2/0 30IN ETHIBOND

## (undated) DEVICE — SUT 2-0 VICRYL / CT-1

## (undated) DEVICE — ELECTRODE REM PLYHSV RETURN 9